# Patient Record
Sex: FEMALE | Race: WHITE | NOT HISPANIC OR LATINO | Employment: OTHER | URBAN - METROPOLITAN AREA
[De-identification: names, ages, dates, MRNs, and addresses within clinical notes are randomized per-mention and may not be internally consistent; named-entity substitution may affect disease eponyms.]

---

## 2018-03-21 ENCOUNTER — OFFICE VISIT (OUTPATIENT)
Dept: FAMILY MEDICINE CLINIC | Facility: CLINIC | Age: 68
End: 2018-03-21

## 2018-03-21 ENCOUNTER — DOCUMENTATION (OUTPATIENT)
Dept: FAMILY MEDICINE CLINIC | Facility: CLINIC | Age: 68
End: 2018-03-21

## 2018-03-21 VITALS
BODY MASS INDEX: 29.81 KG/M2 | SYSTOLIC BLOOD PRESSURE: 126 MMHG | WEIGHT: 162 LBS | HEIGHT: 62 IN | OXYGEN SATURATION: 98 % | RESPIRATION RATE: 14 BRPM | DIASTOLIC BLOOD PRESSURE: 86 MMHG | HEART RATE: 77 BPM

## 2018-03-21 DIAGNOSIS — Z23 NEED FOR VACCINATION: ICD-10-CM

## 2018-03-21 DIAGNOSIS — B02.9 HERPES ZOSTER WITHOUT COMPLICATION: ICD-10-CM

## 2018-03-21 DIAGNOSIS — Z78.0 POST-MENOPAUSAL: ICD-10-CM

## 2018-03-21 DIAGNOSIS — B35.1 ONYCHOMYCOSIS: ICD-10-CM

## 2018-03-21 DIAGNOSIS — Z12.11 SCREENING FOR COLON CANCER: ICD-10-CM

## 2018-03-21 DIAGNOSIS — E78.5 HYPERLIPIDEMIA, UNSPECIFIED HYPERLIPIDEMIA TYPE: ICD-10-CM

## 2018-03-21 DIAGNOSIS — M85.80 OSTEOPENIA, UNSPECIFIED LOCATION: ICD-10-CM

## 2018-03-21 DIAGNOSIS — Z13.1 SCREENING FOR DIABETES MELLITUS: ICD-10-CM

## 2018-03-21 DIAGNOSIS — H93.13 TINNITUS OF BOTH EARS: ICD-10-CM

## 2018-03-21 DIAGNOSIS — Z91.89 AT RISK FOR CANCER: ICD-10-CM

## 2018-03-21 DIAGNOSIS — R53.83 OTHER FATIGUE: ICD-10-CM

## 2018-03-21 DIAGNOSIS — Z12.4 SCREENING FOR CERVICAL CANCER: ICD-10-CM

## 2018-03-21 DIAGNOSIS — Z00.00 ANNUAL PHYSICAL EXAM: Primary | ICD-10-CM

## 2018-03-21 DIAGNOSIS — Z11.59 NEED FOR HEPATITIS C SCREENING TEST: ICD-10-CM

## 2018-03-21 DIAGNOSIS — E03.9 HYPOTHYROIDISM, UNSPECIFIED TYPE: ICD-10-CM

## 2018-03-21 LAB
ALBUMIN SERPL-MCNC: 4.7 G/DL (ref 3.5–5.2)
ALBUMIN/GLOB SERPL: 1.7 G/DL
ALP SERPL-CCNC: 79 U/L (ref 39–117)
ALT SERPL-CCNC: 26 U/L (ref 1–33)
AST SERPL-CCNC: 26 U/L (ref 1–32)
BASOPHILS # BLD AUTO: 0.08 10*3/MM3 (ref 0–0.2)
BASOPHILS NFR BLD AUTO: 1 % (ref 0–1.5)
BILIRUB SERPL-MCNC: 0.3 MG/DL (ref 0.1–1.2)
BUN SERPL-MCNC: 15 MG/DL (ref 8–23)
BUN/CREAT SERPL: 17.4 (ref 7–25)
CALCIUM SERPL-MCNC: 10.4 MG/DL (ref 8.6–10.5)
CHLORIDE SERPL-SCNC: 100 MMOL/L (ref 98–107)
CHOLEST SERPL-MCNC: 309 MG/DL (ref 0–200)
CO2 SERPL-SCNC: 28 MMOL/L (ref 22–29)
CREAT SERPL-MCNC: 0.86 MG/DL (ref 0.57–1)
EOSINOPHIL # BLD AUTO: 0.2 10*3/MM3 (ref 0–0.7)
EOSINOPHIL NFR BLD AUTO: 2.4 % (ref 0.3–6.2)
ERYTHROCYTE [DISTWIDTH] IN BLOOD BY AUTOMATED COUNT: 14.9 % (ref 11.7–13)
GFR SERPLBLD CREATININE-BSD FMLA CKD-EPI: 66 ML/MIN/1.73
GFR SERPLBLD CREATININE-BSD FMLA CKD-EPI: 80 ML/MIN/1.73
GLOBULIN SER CALC-MCNC: 2.7 GM/DL
GLUCOSE SERPL-MCNC: 86 MG/DL (ref 65–99)
HCT VFR BLD AUTO: 46.4 % (ref 35.6–45.5)
HDLC SERPL-MCNC: 49 MG/DL (ref 40–60)
HGB BLD-MCNC: 14.5 G/DL (ref 11.9–15.5)
IMM GRANULOCYTES # BLD: 0.03 10*3/MM3 (ref 0–0.03)
IMM GRANULOCYTES NFR BLD: 0.4 % (ref 0–0.5)
LDLC SERPL CALC-MCNC: 227 MG/DL (ref 0–100)
LDLC/HDLC SERPL: 4.63 {RATIO}
LYMPHOCYTES # BLD AUTO: 2.31 10*3/MM3 (ref 0.9–4.8)
LYMPHOCYTES NFR BLD AUTO: 27.5 % (ref 19.6–45.3)
MCH RBC QN AUTO: 28.8 PG (ref 26.9–32)
MCHC RBC AUTO-ENTMCNC: 31.3 G/DL (ref 32.4–36.3)
MCV RBC AUTO: 92.1 FL (ref 80.5–98.2)
MONOCYTES # BLD AUTO: 0.72 10*3/MM3 (ref 0.2–1.2)
MONOCYTES NFR BLD AUTO: 8.6 % (ref 5–12)
NEUTROPHILS # BLD AUTO: 5.07 10*3/MM3 (ref 1.9–8.1)
NEUTROPHILS NFR BLD AUTO: 60.1 % (ref 42.7–76)
PLATELET # BLD AUTO: 253 10*3/MM3 (ref 140–500)
POTASSIUM SERPL-SCNC: 4.2 MMOL/L (ref 3.5–5.2)
PROT SERPL-MCNC: 7.4 G/DL (ref 6–8.5)
RBC # BLD AUTO: 5.04 10*6/MM3 (ref 3.9–5.2)
SODIUM SERPL-SCNC: 143 MMOL/L (ref 136–145)
TRIGL SERPL-MCNC: 166 MG/DL (ref 0–150)
TSH SERPL DL<=0.005 MIU/L-ACNC: 3.75 MIU/ML (ref 0.27–4.2)
VLDLC SERPL CALC-MCNC: 33.2 MG/DL (ref 5–40)
WBC # BLD AUTO: 8.41 10*3/MM3 (ref 4.5–10.7)

## 2018-03-21 PROCEDURE — G0009 ADMIN PNEUMOCOCCAL VACCINE: HCPCS | Performed by: FAMILY MEDICINE

## 2018-03-21 PROCEDURE — G0402 INITIAL PREVENTIVE EXAM: HCPCS | Performed by: FAMILY MEDICINE

## 2018-03-21 PROCEDURE — 90670 PCV13 VACCINE IM: CPT | Performed by: FAMILY MEDICINE

## 2018-03-21 RX ORDER — VALACYCLOVIR HYDROCHLORIDE 1 G/1
1000 TABLET, FILM COATED ORAL 3 TIMES DAILY
Qty: 21 TABLET | Refills: 0 | Status: SHIPPED | OUTPATIENT
Start: 2018-03-21 | End: 2018-03-30 | Stop reason: SDUPTHER

## 2018-03-21 NOTE — PATIENT INSTRUCTIONS
"Massage David's Vaporub into your toenails each evening.      Don't forget to schedule an eye exam soon.      Try doing 2 sets of 30 Kegels daily.    Try to drink 2 liters of water per day.      Food Choices to Lower Your Triglycerides  Triglycerides are a type of fat in your blood. High levels of triglycerides can increase the risk of heart disease and stroke. If your triglyceride levels are high, the foods you eat and your eating habits are very important. Choosing the right foods can help lower your triglycerides.  What general guidelines do I need to follow?  · Lose weight if you are overweight.  · Limit or avoid alcohol.  · Fill one half of your plate with vegetables and green salads.  · Limit fruit to two servings a day. Choose fruit instead of juice.  · Make one fourth of your plate whole grains. Look for the word \"whole\" as the first word in the ingredient list.  · Fill one fourth of your plate with lean protein foods.  · Enjoy fatty fish (such as salmon, mackerel, sardines, and tuna) three times a week.  · Choose healthy fats.  · Limit foods high in starch and sugar.  · Eat more home-cooked food and less restaurant, buffet, and fast food.  · Limit fried foods.  · Cook foods using methods other than frying.  · Limit saturated fats.  · Check ingredient lists to avoid foods with partially hydrogenated oils (trans fats) in them.  What foods can I eat?  Grains   Whole grains, such as whole wheat or whole grain breads, crackers, cereals, and pasta. Unsweetened oatmeal, bulgur, barley, quinoa, or brown rice. Corn or whole wheat flour tortillas.  Vegetables   Fresh or frozen vegetables (raw, steamed, roasted, or grilled). Green salads.  Fruits   All fresh, canned (in natural juice), or frozen fruits.  Meat and Other Protein Products   Ground beef (85% or leaner), grass-fed beef, or beef trimmed of fat. Skinless chicken or turkey. Ground chicken or turkey. Pork trimmed of fat. All fish and seafood. Eggs. Dried beans, " peas, or lentils. Unsalted nuts or seeds. Unsalted canned or dry beans.  Dairy   Low-fat dairy products, such as skim or 1% milk, 2% or reduced-fat cheeses, low-fat ricotta or cottage cheese, or plain low-fat yogurt.  Fats and Oils   Tub margarines without trans fats. Light or reduced-fat mayonnaise and salad dressings. Avocado. Safflower, olive, or canola oils. Natural peanut or almond butter.  The items listed above may not be a complete list of recommended foods or beverages. Contact your dietitian for more options.   What foods are not recommended?  Grains   White bread. White pasta. White rice. Cornbread. Bagels, pastries, and croissants. Crackers that contain trans fat.  Vegetables   White potatoes. Corn. Creamed or fried vegetables. Vegetables in a cheese sauce.  Fruits   Dried fruits. Canned fruit in light or heavy syrup. Fruit juice.  Meat and Other Protein Products   Fatty cuts of meat. Ribs, chicken wings, mcdaniel, sausage, bologna, salami, chitterlings, fatback, hot dogs, bratwurst, and packaged luncheon meats.  Dairy   Whole or 2% milk, cream, half-and-half, and cream cheese. Whole-fat or sweetened yogurt. Full-fat cheeses. Nondairy creamers and whipped toppings. Processed cheese, cheese spreads, or cheese curds.  Sweets and Desserts   Corn syrup, sugars, honey, and molasses. Candy. Jam and jelly. Syrup. Sweetened cereals. Cookies, pies, cakes, donuts, muffins, and ice cream.  Fats and Oils   Butter, stick margarine, lard, shortening, ghee, or mcdaniel fat. Coconut, palm kernel, or palm oils.  Beverages   Alcohol. Sweetened drinks (such as sodas, lemonade, and fruit drinks or punches).  The items listed above may not be a complete list of foods and beverages to avoid. Contact your dietitian for more information.   This information is not intended to replace advice given to you by your health care provider. Make sure you discuss any questions you have with your health care provider.  Document Released:  10/05/2005 Document Revised: 05/25/2017 Document Reviewed: 10/22/2014  Elsevier Interactive Patient Education © 2017 [employer]

## 2018-03-21 NOTE — PROGRESS NOTES
Welcome to Medicare    Subjective   History of Present Illness    Leny Neumann is a 67 y.o. female who presents for an Medicare Wellness Visit. In addition, we addressed the following health issues:      Toenail fungus on both first toenails  Worsening for a few years  She has been treated with Lamisil 3 x with no improvement    Worsening fatigue x 1 yr    Bilateral tinnitus for at least 2 yr    Rash on R flank x 3 weeks, itches and burns    Past Medical History:   Diagnosis Date   • Allergic    • Colon polyp    • Hyperlipidemia     did not tolerate statins   • Hypothyroidism     transient   • Osteopenia      History reviewed. No pertinent surgical history.      Family History   Problem Relation Age of Onset   • Breast cancer Mother 68   • Lung cancer Father 64   • Heart failure Father    • Heart attack Father    • Alcohol abuse Daughter    • Early death Maternal Grandfather 35     farm accident       Social History     Social History   • Marital status:      Social History Main Topics   • Smoking status: Former Smoker     Quit date: 2012   • Smokeless tobacco: Never Used      Comment: smoked 1 ppd for 20 yr, quit in 2000, then relapsed for a year in 2011   • Alcohol use Yes      Comment: Rarely   • Drug use: No   • Sexual activity: Not Currently     Social History Narrative    Lives in a place of her own with her 2 dogs.  Works as an .           No Known Allergies    No outpatient prescriptions prior to visit.     No facility-administered medications prior to visit.         Patient Active Problem List   Diagnosis   • Hyperlipidemia   • Osteopenia   • Colon polyp   • Allergic   • Hypothyroidism   • Tinnitus of both ears   • Onychomycosis         Patient Care Team:  Salome Weaver MD as PCP - General (Family Medicine)     Health Habits:  Current Diet: Well balanced diet  Dental Exam.  UTD  Eye Exam. Not UTD  Exercise: used to exercise 5 x per week, but has dropped down to 1 x per  week due to fatigue   Current exercise activities include: exercise machines  Fasting for labs today  She lost 45 lb intentionally with Isa Chase in 2015, but has regained it   Pt reports that her mammogram is UTD and WNL in July 2017 at the [facility]  Last pap smear was over 5 yr ago; no hx of abnormals  She declines STI screening  Menopause at age 42, no post-menopausal bleeding  Last DEXA showed osteopenia a couple of years ago  Colonoscopy is due  Mild depression but no SI; pt declines SSRI or referral for counseling at this time    Recent Hospitalizations:  none    Depression Screen:   PHQ-2/PHQ-9 Depression Screening 3/21/2018   Little interest or pleasure in doing things 0   Feeling down, depressed, or hopeless 1   Total Score 1       Functional and Cognitive Screening:  Functional & Cognitive Status 3/21/2018   Do you have difficulty preparing food and eating? No   Do you have difficulty bathing yourself, getting dressed or grooming yourself? No   Do you have difficulty using the toilet? No   Do you have difficulty moving around from place to place? No   Do you have trouble with steps or getting out of a bed or a chair? No   In the past year have you fallen or experienced a near fall? No   Current Diet Well Balanced Diet   Dental Exam Up to date   Eye Exam Not up to date   Exercise (times per week) 2 times per week   Current Exercise Activities Include Cardiovasular Workout on Exercise Equipment   Do you need help using the phone?  No   Are you deaf or do you have serious difficulty hearing?  No   Do you need help with transportation? No   Do you need help shopping? No   Do you need help preparing meals?  No   Do you need help with housework?  No   Do you need help with laundry? No   Do you need help taking your medications? No   Do you need help managing money? No   Do you ever drive or ride in a car without wearing a seat belt? No     Does the patient have evidence of cognitive impairment?  "NO      Review of Systems   Constitutional: Positive for fatigue and unexpected weight change. Negative for fever.   HENT: Positive for congestion and tinnitus (bilateral for years). Negative for ear pain, rhinorrhea, sinus pain, sinus pressure and sore throat.    Eyes: Negative for pain and visual disturbance.   Respiratory: Positive for shortness of breath (since URI last month). Negative for cough, chest tightness and wheezing.    Cardiovascular: Negative for chest pain and palpitations.   Gastrointestinal: Negative for abdominal pain, blood in stool, constipation, diarrhea, nausea and vomiting.   Genitourinary: Positive for enuresis (small volume, seemingly random). Negative for decreased urine volume, dysuria, hematuria, pelvic pain, urgency, vaginal bleeding, vaginal discharge and vaginal pain.        Occasional foul smelling urine   Musculoskeletal: Negative for arthralgias, gait problem and myalgias.   Skin: Positive for rash (on trunk). Negative for wound.   Neurological: Positive for light-headedness (occasionally, with position changes). Negative for dizziness, weakness and numbness.   Hematological: Positive for adenopathy (coming down after recent URI). Does not bruise/bleed easily.   Psychiatric/Behavioral: Positive for dysphoric mood (mild). Negative for sleep disturbance and suicidal ideas. The patient is not nervous/anxious.        Objective     Vitals:    03/21/18 1019   BP: 126/86   Pulse: 77   Resp: 14   SpO2: 98%   Weight: 73.5 kg (162 lb)   Height: 156.2 cm (61.5\")       Body mass index is 30.11 kg/m².    Physical Exam   Constitutional: Vital signs are normal. She appears well-developed and well-nourished. No distress.   HENT:   Head: Normocephalic and atraumatic.   Right Ear: Tympanic membrane and ear canal normal.   Left Ear: Tympanic membrane and ear canal normal.   Mouth/Throat: Oropharynx is clear and moist and mucous membranes are normal.   Cardiovascular: Normal rate, regular rhythm, S1 " normal, S2 normal and normal heart sounds.  Exam reveals no gallop and no friction rub.    No murmur heard.  Pulses:       Radial pulses are 2+ on the right side, and 2+ on the left side.   Pulmonary/Chest: Effort normal and breath sounds normal. She has no wheezes. She has no rales. She exhibits no tenderness. Right breast exhibits no mass, no nipple discharge and no skin change. Left breast exhibits no mass, no nipple discharge and no skin change.   Abdominal: Soft. Bowel sounds are normal. She exhibits no distension and no mass. There is no hepatosplenomegaly. There is no tenderness. There is no rebound and no guarding. No hernia.   Genitourinary: Vagina normal and uterus normal. No breast tenderness. Pelvic exam was performed with patient supine. There is no rash or lesion on the right labia. There is lesion (3 mm skin tag) on the left labia. There is no rash on the left labia. Cervix exhibits no motion tenderness, no discharge and no friability. Right adnexum displays no mass and no fullness. Left adnexum displays no mass and no fullness. No bleeding in the vagina. No vaginal discharge found.   Lymphadenopathy:     She has cervical adenopathy (bilateral, mildly tender).     She has no axillary adenopathy.        Right: No inguinal and no supraclavicular adenopathy present.        Left: No inguinal and no supraclavicular adenopathy present.   Skin: Skin is warm and dry. No rash noted. No cyanosis or erythema. Nails show no clubbing.   1st toenails thickened, yellow, and ridged.    Erythematous vesicular rash in a band-like pattern over R lateral rib cage   Psychiatric: Her speech is normal and behavior is normal. Her mood appears anxious.   Nursing note and vitals reviewed.      ASSESSMENT AND PLAN      Problem List Items Addressed This Visit        Cardiovascular and Mediastinum    Hyperlipidemia    Relevant Orders    Lipid Panel With LDL / HDL Ratio       Endocrine    Hypothyroidism    Relevant Orders    TSH        Nervous and Auditory    Tinnitus of both ears    Relevant Orders    Ambulatory Referral to ENT (Otolaryngology)       Musculoskeletal and Integument    Osteopenia    Relevant Orders    DEXA Bone Density Axial    Onychomycosis    Relevant Medications    Pt has failed 3 rounds of terbinafine and several topical medications  Therefore will treat with 3 month pulse-dosed itraconazole 200 MG tablet  If unsuccessful will refer to Podiatry      Other Visit Diagnoses     Annual physical exam    -  Primary    Relevant Orders    Comprehensive Metabolic Panel    Lipid Panel With LDL / HDL Ratio    TSH    DEXA Bone Density Axial    Ambulatory Referral to Gastroenterology    Hepatitis C Antibody    Pneumococcal Conjugate Vaccine 13-Valent All (PCV13) (Completed)    CBC & Differential    Pap IG (Image Guided)    Screening for diabetes mellitus        Relevant Orders    Comprehensive Metabolic Panel    Post-menopausal        Relevant Orders    DEXA Bone Density Axial    Screening for colon cancer        Relevant Orders    Ambulatory Referral to Gastroenterology    Need for hepatitis C screening test        Relevant Orders    Hepatitis C Antibody    Need for vaccination        Relevant Orders    Pneumococcal Conjugate Vaccine 13-Valent All (PCV13) (Completed)    Screening for cervical cancer        Relevant Orders    Pap IG (Image Guided)    Other fatigue        Relevant Orders    CBC & Differential  TSH  CMP      Herpes zoster without complication        Relevant Medications    valACYclovir (VALTREX) 1000 MG tablet    At risk for cancer              Orders:  Orders Placed This Encounter   Procedures   • DEXA Bone Density Axial   • Pneumococcal Conjugate Vaccine 13-Valent All (PCV13)   • Comprehensive Metabolic Panel   • Lipid Panel With LDL / HDL Ratio   • TSH   • Hepatitis C Antibody   • Ambulatory Referral to Gastroenterology   • Ambulatory Referral to ENT (Otolaryngology)   • CBC & Differential       Follow Up:  Return in  about 1 year (around 3/21/2019), or if symptoms worsen or fail to improve.       An After Visit Summary and PPPS with all of these plans were given to the patient.

## 2018-03-22 LAB — HCV AB S/CO SERPL IA: <0.1 S/CO RATIO (ref 0–0.9)

## 2018-03-23 LAB
CYTOLOGIST CVX/VAG CYTO: NORMAL
CYTOLOGY CVX/VAG DOC THIN PREP: NORMAL
DX ICD CODE: NORMAL
HIV 1 & 2 AB SER-IMP: NORMAL
OTHER STN SPEC: NORMAL
PATH REPORT.FINAL DX SPEC: NORMAL
STAT OF ADQ CVX/VAG CYTO-IMP: NORMAL

## 2018-03-28 ENCOUNTER — HOSPITAL ENCOUNTER (OUTPATIENT)
Dept: BONE DENSITY | Facility: HOSPITAL | Age: 68
Discharge: HOME | End: 2018-03-28
Admitting: FAMILY MEDICINE

## 2018-03-28 ENCOUNTER — TELEPHONE (OUTPATIENT)
Dept: FAMILY MEDICINE CLINIC | Facility: CLINIC | Age: 68
End: 2018-03-28

## 2018-03-28 DIAGNOSIS — E78.2 MIXED HYPERLIPIDEMIA: Primary | ICD-10-CM

## 2018-03-28 DIAGNOSIS — N95.1 MENOPAUSAL HOT FLUSHES: ICD-10-CM

## 2018-03-28 DIAGNOSIS — B35.1 ONYCHOMYCOSIS: Primary | ICD-10-CM

## 2018-03-28 PROCEDURE — 77080 DXA BONE DENSITY AXIAL: CPT

## 2018-03-28 RX ORDER — PRAVASTATIN SODIUM 20 MG
20 TABLET ORAL DAILY
Qty: 30 TABLET | Refills: 2 | Status: SHIPPED | OUTPATIENT
Start: 2018-03-28 | End: 2018-03-30 | Stop reason: SDUPTHER

## 2018-03-28 RX ORDER — PAROXETINE 10 MG/1
10 TABLET, FILM COATED ORAL EVERY MORNING
Qty: 30 TABLET | Refills: 2 | Status: SHIPPED | OUTPATIENT
Start: 2018-03-28 | End: 2018-03-30 | Stop reason: SDUPTHER

## 2018-03-28 NOTE — PROGRESS NOTES
Pt called and informed of osteopenia.  Pt advised to start an OTC calcium-vitamin D supplement and increase weight-bearing exercise.

## 2018-03-28 NOTE — TELEPHONE ENCOUNTER
Pt called to discuss lab results.  Pt also mentions that itraconazole prescribed for onychomycosis is too expensive even with partial coverage from her insurer.  She has failed 3 rounds of treatment with terbinafine in the past.  Will refer to Podiatry.

## 2018-03-28 NOTE — PROGRESS NOTES
"Pt called and results discussed.  Significant  HLD with .  Pt reports severe myalgias and \"knots on the ankles\" with Lipitor, Zocor, and Simvastatin in the past.  She has never tried Pravastatin and would be willing to see if she tolerates it.  Pap smear, Hep C screening, blood sugar, kidney function, liver enzymes, blood counts and thyroid hormone were normal.  Pt requests medication for menopausal hot flashes that make it hard for her to sleep at night.  Will send Rx for Brisdelle to her pharmacy.  Pt advised to start 1 medication then wait a week before starting the second new medication.  Follow up in 2-3 months.    "

## 2018-03-30 DIAGNOSIS — E78.2 MIXED HYPERLIPIDEMIA: ICD-10-CM

## 2018-03-30 DIAGNOSIS — N95.1 MENOPAUSAL HOT FLUSHES: ICD-10-CM

## 2018-03-30 DIAGNOSIS — B02.9 HERPES ZOSTER WITHOUT COMPLICATION: ICD-10-CM

## 2018-03-30 DIAGNOSIS — B35.1 ONYCHOMYCOSIS: ICD-10-CM

## 2018-03-30 RX ORDER — PAROXETINE 10 MG/1
10 TABLET, FILM COATED ORAL EVERY MORNING
Qty: 30 TABLET | Refills: 2 | Status: SHIPPED | OUTPATIENT
Start: 2018-03-30 | End: 2018-11-14

## 2018-03-30 RX ORDER — VALACYCLOVIR HYDROCHLORIDE 1 G/1
1000 TABLET, FILM COATED ORAL 3 TIMES DAILY
Qty: 21 TABLET | Refills: 0 | Status: SHIPPED | OUTPATIENT
Start: 2018-03-30 | End: 2018-05-04 | Stop reason: SDUPTHER

## 2018-03-30 RX ORDER — PRAVASTATIN SODIUM 20 MG
20 TABLET ORAL DAILY
Qty: 30 TABLET | Refills: 2 | Status: SHIPPED | OUTPATIENT
Start: 2018-03-30 | End: 2018-06-29 | Stop reason: SDUPTHER

## 2018-04-27 ENCOUNTER — PREP FOR SURGERY (OUTPATIENT)
Dept: OTHER | Facility: HOSPITAL | Age: 68
End: 2018-04-27

## 2018-04-27 DIAGNOSIS — Z12.11 SCREENING FOR COLON CANCER: Primary | ICD-10-CM

## 2018-05-04 DIAGNOSIS — B02.9 HERPES ZOSTER WITHOUT COMPLICATION: ICD-10-CM

## 2018-05-04 RX ORDER — VALACYCLOVIR HYDROCHLORIDE 1 G/1
1000 TABLET, FILM COATED ORAL 3 TIMES DAILY
Qty: 21 TABLET | Refills: 0 | Status: SHIPPED | OUTPATIENT
Start: 2018-05-04 | End: 2018-11-14

## 2018-05-16 PROBLEM — Z12.11 SCREENING FOR COLON CANCER: Status: ACTIVE | Noted: 2018-05-16

## 2018-06-28 ENCOUNTER — TELEPHONE (OUTPATIENT)
Dept: FAMILY MEDICINE CLINIC | Facility: CLINIC | Age: 68
End: 2018-06-28

## 2018-06-28 NOTE — TELEPHONE ENCOUNTER
Called and spoke with patient to let her know her mammogram was normal.     ----- Message from Salome Weaver MD sent at 6/27/2018  7:49 PM EDT -----  Please contact pt and let her know that her recent mammogram was normal.  Next routine mammogram will be due in 1 year.

## 2018-06-29 DIAGNOSIS — E78.2 MIXED HYPERLIPIDEMIA: ICD-10-CM

## 2018-06-29 RX ORDER — PRAVASTATIN SODIUM 20 MG
20 TABLET ORAL DAILY
Qty: 30 TABLET | Refills: 4 | Status: SHIPPED | OUTPATIENT
Start: 2018-06-29 | End: 2018-12-19 | Stop reason: SDUPTHER

## 2018-08-16 ENCOUNTER — ANESTHESIA EVENT (OUTPATIENT)
Dept: GASTROENTEROLOGY | Facility: HOSPITAL | Age: 68
End: 2018-08-16

## 2018-08-16 ENCOUNTER — HOSPITAL ENCOUNTER (OUTPATIENT)
Facility: HOSPITAL | Age: 68
Setting detail: HOSPITAL OUTPATIENT SURGERY
Discharge: HOME | End: 2018-08-16
Attending: INTERNAL MEDICINE | Admitting: INTERNAL MEDICINE

## 2018-08-16 ENCOUNTER — ANESTHESIA (OUTPATIENT)
Dept: GASTROENTEROLOGY | Facility: HOSPITAL | Age: 68
End: 2018-08-16

## 2018-08-16 VITALS
HEART RATE: 63 BPM | WEIGHT: 158.38 LBS | RESPIRATION RATE: 16 BRPM | OXYGEN SATURATION: 99 % | DIASTOLIC BLOOD PRESSURE: 79 MMHG | SYSTOLIC BLOOD PRESSURE: 152 MMHG | TEMPERATURE: 98.1 F | BODY MASS INDEX: 29.44 KG/M2

## 2018-08-16 DIAGNOSIS — Z12.11 SCREENING FOR COLON CANCER: ICD-10-CM

## 2018-08-16 PROCEDURE — S0260 H&P FOR SURGERY: HCPCS | Performed by: INTERNAL MEDICINE

## 2018-08-16 PROCEDURE — 45380 COLONOSCOPY AND BIOPSY: CPT | Performed by: INTERNAL MEDICINE

## 2018-08-16 PROCEDURE — 45385 COLONOSCOPY W/LESION REMOVAL: CPT | Performed by: INTERNAL MEDICINE

## 2018-08-16 PROCEDURE — 88305 TISSUE EXAM BY PATHOLOGIST: CPT | Performed by: INTERNAL MEDICINE

## 2018-08-16 PROCEDURE — 25010000002 PROPOFOL 10 MG/ML EMULSION: Performed by: ANESTHESIOLOGY

## 2018-08-16 RX ORDER — PROPOFOL 10 MG/ML
VIAL (ML) INTRAVENOUS AS NEEDED
Status: DISCONTINUED | OUTPATIENT
Start: 2018-08-16 | End: 2018-08-16 | Stop reason: SURG

## 2018-08-16 RX ORDER — PROPOFOL 10 MG/ML
VIAL (ML) INTRAVENOUS CONTINUOUS PRN
Status: DISCONTINUED | OUTPATIENT
Start: 2018-08-16 | End: 2018-08-16 | Stop reason: SURG

## 2018-08-16 RX ORDER — LIDOCAINE HYDROCHLORIDE 20 MG/ML
INJECTION, SOLUTION INFILTRATION; PERINEURAL AS NEEDED
Status: DISCONTINUED | OUTPATIENT
Start: 2018-08-16 | End: 2018-08-16 | Stop reason: SURG

## 2018-08-16 RX ORDER — SODIUM CHLORIDE 0.9 % (FLUSH) 0.9 %
3 SYRINGE (ML) INJECTION AS NEEDED
Status: DISCONTINUED | OUTPATIENT
Start: 2018-08-16 | End: 2018-08-16 | Stop reason: HOSPADM

## 2018-08-16 RX ORDER — SODIUM CHLORIDE, SODIUM LACTATE, POTASSIUM CHLORIDE, CALCIUM CHLORIDE 600; 310; 30; 20 MG/100ML; MG/100ML; MG/100ML; MG/100ML
1000 INJECTION, SOLUTION INTRAVENOUS CONTINUOUS
Status: DISCONTINUED | OUTPATIENT
Start: 2018-08-16 | End: 2018-08-16 | Stop reason: HOSPADM

## 2018-08-16 RX ADMIN — PROPOFOL 140 MCG/KG/MIN: 10 INJECTION, EMULSION INTRAVENOUS at 13:33

## 2018-08-16 RX ADMIN — LIDOCAINE HYDROCHLORIDE 50 MG: 20 INJECTION, SOLUTION INFILTRATION; PERINEURAL at 13:33

## 2018-08-16 RX ADMIN — PROPOFOL 125 MG: 10 INJECTION, EMULSION INTRAVENOUS at 13:33

## 2018-08-16 NOTE — ANESTHESIA POSTPROCEDURE EVALUATION
Patient: Leny Neumann    Procedure Summary     Date:  08/16/18 Room / Location:   [facility]    Anesthesia Start:  1327 Anesthesia Stop:  1407    Procedure:  COLONOSCOPY into cecum with polypectomy (N/A ) Diagnosis:       Screening for colon cancer      (Screening for colon cancer [Z12.11])    Surgeon:  Stephany Akbar MD Provider:  Justice Pritchard MD    Anesthesia Type:  MAC ASA Status:  1          Anesthesia Type: MAC  Last vitals  BP   152/79 (08/16/18 1427)   Temp   36.7 °C (98.1 °F) (08/16/18 1226)   Pulse   63 (08/16/18 1427)   Resp   16 (08/16/18 1427)     SpO2   99 % (08/16/18 1427)     Post Anesthesia Care and Evaluation    Patient location during evaluation: bedside  Patient participation: complete - patient participated  Level of consciousness: awake and alert  Pain score: 0  Pain management: adequate  Airway patency: patent  Anesthetic complications: No anesthetic complications  PONV Status: none  Cardiovascular status: acceptable  Respiratory status: acceptable  Hydration status: acceptable  Post Neuraxial Block status: Motor and sensory function returned to baseline

## 2018-08-16 NOTE — ANESTHESIA PREPROCEDURE EVALUATION
Anesthesia Evaluation     Patient summary reviewed                Airway   Mallampati: III  TM distance: <3 FB  Neck ROM: limited  Possible difficult intubation  Dental - normal exam     Pulmonary     breath sounds clear to auscultation  Cardiovascular   Exercise tolerance: good (4-7 METS)    Rhythm: regular  Rate: normal        Neuro/Psych  GI/Hepatic/Renal/Endo      Musculoskeletal     Abdominal    Substance History      OB/GYN          Other                        Anesthesia Plan    ASA 1     MAC     intravenous induction   Anesthetic plan and risks discussed with patient.

## 2018-08-16 NOTE — H&P
[facility]  Pre Procedure History & Physical    Chief Complaint:   History of polyps    Subjective     HPI:   69 yo wf for colonoscopy. She reports last exam was approximately 10 years ago and she had 3 polyps removed. She denies any active GI symptoms or concerns.  No FH CRC/polyps.    Past Medical History:   Past Medical History:   Diagnosis Date   • Allergic    • Colon polyp    • Hyperlipidemia     did not tolerate statins   • Hypothyroidism     transient   • Menopausal hot flushes    • Osteopenia    • PONV (postoperative nausea and vomiting)    • Tinea pedis        Past Surgical History:  History reviewed. No pertinent surgical history.    Family History:  Family History   Problem Relation Age of Onset   • Breast cancer Mother 68   • Lung cancer Father 64   • Heart failure Father    • Heart attack Father    • Alcohol abuse Daughter    • Early death Maternal Grandfather 35        farm accident       Social History:   reports that she quit smoking about 6 years ago. She has never used smokeless tobacco. She reports that she drinks alcohol. She reports that she does not use drugs.    Medications:   Prescriptions Prior to Admission   Medication Sig Dispense Refill Last Dose   • Itraconazole 200 MG tablet Take 200 mg by mouth See Admin Instructions. Take 1 tablet PO BID x 1 week each month 42 tablet 1 8/7/2018   • PARoxetine (PAXIL) 10 MG tablet Take 1 tablet by mouth Every Morning. 30 tablet 2 Unknown at Unknown time   • pravastatin (PRAVACHOL) 20 MG tablet TAKE 1 TABLET BY MOUTH DAILY. 30 tablet 4 8/12/2018   • valACYclovir (VALTREX) 1000 MG tablet Take 1 tablet by mouth 3 (Three) Times a Day. 21 tablet 0 More than a month at Unknown time       Allergies:  Patient has no known allergies.    ROS:    Pertinent items are noted in HPI, all other systems reviewed and negative     Objective     Blood pressure 124/95, pulse 68, temperature 98.1 °F (36.7 °C), temperature source Oral, resp. rate 18,  weight 71.8 kg (158 lb 6 oz), SpO2 97 %.    Physical Exam   Constitutional: Pt is oriented to person, place, and time and well-developed, well-nourished, and in no distress.   Mouth/Throat: Oropharynx is clear and moist.   Neck: Normal range of motion.   Cardiovascular: Normal rate, regular rhythm    Pulmonary/Chest: Effort normal   Abdominal: Soft. Nontender  Skin: Skin is warm and dry.   Psychiatric: Mood, memory, affect and judgment normal.     Assessment/Plan     Diagnosis:  History of polyps    Anticipated Surgical Procedure:  colonoscopy    The risks, benefits, and alternatives of this procedure have been discussed with the patient or the responsible party- the patient understands and agrees to proceed.

## 2018-08-17 LAB
CYTO UR: NORMAL
LAB AP CASE REPORT: NORMAL
LAB AP CLINICAL INFORMATION: NORMAL
PATH REPORT.FINAL DX SPEC: NORMAL
PATH REPORT.GROSS SPEC: NORMAL

## 2018-08-20 ENCOUNTER — TELEPHONE (OUTPATIENT)
Dept: GASTROENTEROLOGY | Facility: CLINIC | Age: 68
End: 2018-08-20

## 2018-08-21 NOTE — TELEPHONE ENCOUNTER
Called pt and advised per Dr Akbar that the polyp showed hyperplastic change.  This is not cancerous and not precancerous and she recommends a repeat c/s in 5 yrs. Pt verb understanding.     C/s placed in recall for 08/16/2023.

## 2018-11-14 ENCOUNTER — OFFICE VISIT (OUTPATIENT)
Dept: FAMILY MEDICINE CLINIC | Facility: CLINIC | Age: 68
End: 2018-11-14

## 2018-11-14 VITALS
SYSTOLIC BLOOD PRESSURE: 118 MMHG | RESPIRATION RATE: 16 BRPM | HEIGHT: 62 IN | WEIGHT: 152 LBS | OXYGEN SATURATION: 98 % | HEART RATE: 86 BPM | BODY MASS INDEX: 27.97 KG/M2 | DIASTOLIC BLOOD PRESSURE: 70 MMHG | TEMPERATURE: 97.7 F

## 2018-11-14 DIAGNOSIS — R09.82 POST-NASAL DRIP: ICD-10-CM

## 2018-11-14 DIAGNOSIS — J01.10 ACUTE NON-RECURRENT FRONTAL SINUSITIS: ICD-10-CM

## 2018-11-14 DIAGNOSIS — J30.2 SEASONAL ALLERGIES: Primary | ICD-10-CM

## 2018-11-14 PROCEDURE — 99213 OFFICE O/P EST LOW 20 MIN: CPT | Performed by: NURSE PRACTITIONER

## 2018-11-14 RX ORDER — MULTIPLE VITAMINS W/ MINERALS TAB 9MG-400MCG
1 TAB ORAL DAILY
COMMUNITY
End: 2022-08-31

## 2018-11-14 RX ORDER — AMOXICILLIN 875 MG/1
875 TABLET, COATED ORAL 2 TIMES DAILY
Qty: 20 TABLET | Refills: 0 | Status: SHIPPED | OUTPATIENT
Start: 2018-11-14 | End: 2019-04-02

## 2018-11-14 NOTE — PROGRESS NOTES
Leny Neumann is a 68 y.o. female. Pt is here for sore throat, sinus pressure and cough. Pt states 3-4 days ago she started having sore throat, and it's been worsening until today, that  She feels a little better. She states she has occasional cough , and has very thick mucus and sinus pressure.   Pt was using flonase and afrin. Pt has severe post nasal drip.  Sinus Pain: Patient complains of congestion, low grade fever, nasal congestion, post nasal drip and sinus pressure. Symptoms include congestion, post nasal drip and sinus pressure with no fever, chills, night sweats or weight loss. Onset of symptoms was 10 days ago, gradually worsening since that time. She is drinking plenty of fluids.  Past history is significant for no history of pneumonia or bronchitis. Patient is non-smoker.    Assessment/Plan   Problem List Items Addressed This Visit     None      Visit Diagnoses     Seasonal allergies    -  Primary    Relevant Orders    Ambulatory Referral to Allergy    Post-nasal drip        Relevant Orders    Ambulatory Referral to Allergy    Acute non-recurrent frontal sinusitis                 Return for Annual.  Patient Instructions   Sinus Headache  A sinus headache happens when your sinuses become clogged or swollen. You may feel pain or pressure in your face, forehead, ears, or upper teeth. Sinus headaches can be mild or severe.  Follow these instructions at home:  · Take medicines only as told by your doctor.  · If you were given an antibiotic medicine, finish all of it even if you start to feel better.  · Use a nose spray if you feel stuffed up (congested).  · If told, apply a warm, moist washcloth to your face to help lessen pain.  Contact a doctor if:  · You get headaches more than one time each week.  · Light or sound bothers you.  · You have a fever.  · You feel sick to your stomach (nauseous) or you throw up (vomit).  · Your headaches do not get better with treatment.  Get help right away if:  · You  "have trouble seeing.  · You suddenly have very bad pain in your face or head.  · You start to twitch or shake (seizure).  · You are confused.  · You have a stiff neck.  This information is not intended to replace advice given to you by your health care provider. Make sure you discuss any questions you have with your health care provider.  Document Released: 2012 Document Revised: 2017 Document Reviewed: 2015  [employer] Interactive Patient Education © 2018 [employer]        Chief Complaint   Patient presents with   • Sore Throat   • Sinus Problem     Social History     Tobacco Use   • Smoking status: Former Smoker     Last attempt to quit:      Years since quittin.8   • Smokeless tobacco: Never Used   • Tobacco comment: smoked 1 ppd for 20 yr, quit in , then relapsed for a year in    Substance Use Topics   • Alcohol use: Yes     Comment: Rarely   • Drug use: No       History of Present Illness     The following portions of the patient's history were reviewed and updated as appropriate:PMHroutine: Social history , Allergies, Current Medications, Active Problem List and Health Maintenance    Review of Systems   HENT: Positive for postnasal drip, rhinorrhea, sinus pressure and sinus pain.        Objective   Vitals:    18 1047   BP: 118/70   Pulse: 86   Resp: 16   Temp: 97.7 °F (36.5 °C)   SpO2: 98%   Weight: 68.9 kg (152 lb)   Height: 156.2 cm (61.5\")     Body mass index is 28.26 kg/m².  Physical Exam   Constitutional: She is oriented to person, place, and time. She appears well-developed and well-nourished. No distress.   HENT:   Head: Normocephalic and atraumatic.   Right Ear: External ear normal.   Left Ear: External ear normal.   Nose: Mucosal edema and rhinorrhea present. Right sinus exhibits maxillary sinus tenderness. Left sinus exhibits maxillary sinus tenderness.   Mouth/Throat: Oropharynx is clear and moist. No oropharyngeal exudate.   Eyes: Conjunctivae and EOM are " normal. Pupils are equal, round, and reactive to light. Right eye exhibits no discharge.   Cardiovascular: Normal rate and regular rhythm.   Pulmonary/Chest: Effort normal and breath sounds normal. No respiratory distress. She has no wheezes.   Neurological: She is alert and oriented to person, place, and time.   Skin: Skin is warm and dry. No rash noted.   Psychiatric: She has a normal mood and affect. Her behavior is normal.   Nursing note and vitals reviewed.    Reviewed Data:  No visits with results within 1 Month(s) from this visit.   Latest known visit with results is:   Admission on 08/16/2018, Discharged on 08/16/2018   Component Date Value Ref Range Status   • Case Report 08/16/2018    Final                    Value:Surgical Pathology Report                         Case: [id number]                                  Authorizing Provider:  Stephany Akbar MD      Collected:           08/16/2018 02:00 PM          Ordering Location:     [facility]  Received:            08/16/2018 03:23 PM                                 [facility]                                                                  Pathologist:           Tim Pedro MD                                                      Specimen:    Large Intestine, Sigmoid Colon, x4                                                        • Clinical Information 08/16/2018    Final                    Value:This result contains rich text formatting which cannot be displayed here.   • Final Diagnosis 08/16/2018    Final                    Value:This result contains rich text formatting which cannot be displayed here.   • Gross Description 08/16/2018    Final                    Value:This result contains rich text formatting which cannot be displayed here.   • Microscopic Description 08/16/2018    Final                    Value:This result contains rich text formatting which cannot be displayed here.

## 2018-11-14 NOTE — PATIENT INSTRUCTIONS
Sinus Headache  A sinus headache happens when your sinuses become clogged or swollen. You may feel pain or pressure in your face, forehead, ears, or upper teeth. Sinus headaches can be mild or severe.  Follow these instructions at home:  · Take medicines only as told by your doctor.  · If you were given an antibiotic medicine, finish all of it even if you start to feel better.  · Use a nose spray if you feel stuffed up (congested).  · If told, apply a warm, moist washcloth to your face to help lessen pain.  Contact a doctor if:  · You get headaches more than one time each week.  · Light or sound bothers you.  · You have a fever.  · You feel sick to your stomach (nauseous) or you throw up (vomit).  · Your headaches do not get better with treatment.  Get help right away if:  · You have trouble seeing.  · You suddenly have very bad pain in your face or head.  · You start to twitch or shake (seizure).  · You are confused.  · You have a stiff neck.  This information is not intended to replace advice given to you by your health care provider. Make sure you discuss any questions you have with your health care provider.  Document Released: 04/18/2012 Document Revised: 08/13/2017 Document Reviewed: 12/14/2015  [employer] Interactive Patient Education © 2018 [employer]

## 2018-12-19 DIAGNOSIS — E78.2 MIXED HYPERLIPIDEMIA: ICD-10-CM

## 2018-12-19 RX ORDER — PRAVASTATIN SODIUM 20 MG
TABLET ORAL
Qty: 30 TABLET | Refills: 1 | Status: SHIPPED | OUTPATIENT
Start: 2018-12-19 | End: 2019-02-16 | Stop reason: SDUPTHER

## 2019-02-16 DIAGNOSIS — E78.2 MIXED HYPERLIPIDEMIA: ICD-10-CM

## 2019-02-18 RX ORDER — PRAVASTATIN SODIUM 20 MG
TABLET ORAL
Qty: 30 TABLET | Refills: 0 | Status: SHIPPED | OUTPATIENT
Start: 2019-02-18 | End: 2019-03-22 | Stop reason: SDUPTHER

## 2019-03-22 DIAGNOSIS — E78.2 MIXED HYPERLIPIDEMIA: ICD-10-CM

## 2019-03-22 RX ORDER — PRAVASTATIN SODIUM 20 MG
TABLET ORAL
Qty: 30 TABLET | Refills: 5 | Status: SHIPPED | OUTPATIENT
Start: 2019-03-22 | End: 2019-04-16 | Stop reason: DRUGHIGH

## 2019-04-02 ENCOUNTER — OFFICE VISIT (OUTPATIENT)
Dept: FAMILY MEDICINE CLINIC | Facility: CLINIC | Age: 69
End: 2019-04-02

## 2019-04-02 VITALS
HEART RATE: 80 BPM | HEIGHT: 62 IN | WEIGHT: 154 LBS | SYSTOLIC BLOOD PRESSURE: 120 MMHG | BODY MASS INDEX: 28.34 KG/M2 | OXYGEN SATURATION: 98 % | DIASTOLIC BLOOD PRESSURE: 70 MMHG

## 2019-04-02 DIAGNOSIS — Z13.6 SCREENING FOR CARDIOVASCULAR CONDITION: ICD-10-CM

## 2019-04-02 DIAGNOSIS — K22.89 ESOPHAGEAL PAIN: ICD-10-CM

## 2019-04-02 DIAGNOSIS — Z23 NEED FOR VACCINATION: Primary | ICD-10-CM

## 2019-04-02 LAB
ALBUMIN SERPL-MCNC: 4.8 G/DL (ref 3.5–5.2)
ALBUMIN/GLOB SERPL: 2.1 G/DL
ALP SERPL-CCNC: 73 U/L (ref 39–117)
ALT SERPL-CCNC: 26 U/L (ref 1–33)
AST SERPL-CCNC: 24 U/L (ref 1–32)
BILIRUB SERPL-MCNC: 0.2 MG/DL (ref 0.2–1.2)
BUN SERPL-MCNC: 21 MG/DL (ref 8–23)
BUN/CREAT SERPL: 20.2 (ref 7–25)
CALCIUM SERPL-MCNC: 11 MG/DL (ref 8.6–10.5)
CHLORIDE SERPL-SCNC: 105 MMOL/L (ref 98–107)
CHOLEST SERPL-MCNC: 256 MG/DL (ref 0–200)
CO2 SERPL-SCNC: 28.2 MMOL/L (ref 22–29)
CREAT SERPL-MCNC: 1.04 MG/DL (ref 0.57–1)
GLOBULIN SER CALC-MCNC: 2.3 GM/DL
GLUCOSE SERPL-MCNC: 91 MG/DL (ref 65–99)
HDLC SERPL-MCNC: 51 MG/DL (ref 40–60)
LDLC SERPL CALC-MCNC: 170 MG/DL (ref 0–100)
LDLC/HDLC SERPL: 3.33 {RATIO}
POTASSIUM SERPL-SCNC: 5.4 MMOL/L (ref 3.5–5.2)
PROT SERPL-MCNC: 7.1 G/DL (ref 6–8.5)
SODIUM SERPL-SCNC: 142 MMOL/L (ref 136–145)
TRIGL SERPL-MCNC: 175 MG/DL (ref 0–150)
VLDLC SERPL CALC-MCNC: 35 MG/DL

## 2019-04-02 PROCEDURE — 90732 PPSV23 VACC 2 YRS+ SUBQ/IM: CPT | Performed by: NURSE PRACTITIONER

## 2019-04-02 PROCEDURE — G0009 ADMIN PNEUMOCOCCAL VACCINE: HCPCS | Performed by: NURSE PRACTITIONER

## 2019-04-02 PROCEDURE — G0439 PPPS, SUBSEQ VISIT: HCPCS | Performed by: NURSE PRACTITIONER

## 2019-04-02 RX ORDER — LEVOCETIRIZINE DIHYDROCHLORIDE 5 MG/1
5 TABLET, FILM COATED ORAL DAILY
Refills: 11 | COMMUNITY
Start: 2019-03-22 | End: 2022-05-27

## 2019-04-02 NOTE — PROGRESS NOTES
Medicare Subsequent Wellness Visit  Subjective She is concerned about incontinence.   History of Present Illness  Is a Dr Weaver pt.    Leny Neumann is a 68 y.o. female who presents for an Medicare Wellness Visit. In addition, we addressed the following health issues:      PMH, PSH, SocHx, FamHx, Allergies, and Medications: Reviewed and updated in the history section of chart.  Family History   Problem Relation Age of Onset   • Breast cancer Mother 68   • Lung cancer Father 64   • Heart failure Father    • Heart attack Father    • Alcohol abuse Daughter    • Early death Maternal Grandfather 35        farm accident       Social History     Social History Narrative    Lives in a place of her own with her 2 dogs.  Works as an .         No Known Allergies    Outpatient Medications Prior to Visit   Medication Sig Dispense Refill   • amoxicillin (AMOXIL) 875 MG tablet Take 1 tablet by mouth 2 (Two) Times a Day. 20 tablet 0   • [employer] FIBER GUMMIES PO Take 2 tablets by mouth.     • Multiple Vitamins-Minerals (MULTIVITAMIN WITH MINERALS) tablet tablet Take 1 tablet by mouth Daily.     • pravastatin (PRAVACHOL) 20 MG tablet TAKE 1 TABLET BY MOUTH EVERY DAY 30 tablet 5     No facility-administered medications prior to visit.         Patient Active Problem List   Diagnosis   • Hyperlipidemia   • Osteopenia   • Colon polyp   • Allergic   • Hypothyroidism   • Tinnitus of both ears   • Onychomycosis   • Other fatigue   • Tinea pedis   • Menopausal hot flushes   • Screening for colon cancer         Patient Care Team:  Salome Weaver MD as PCP - General (Family Medicine)  eLxy Rivera APRN as PCP - Claims Attributed  Health Habits:  Current Diet: Well balanced diet  Dental Exam.   Eye Exam.   Exercise:  Current exercise activities include:    Recent Hospitalizations:  none    Age-appropriate Screening Schedule:  Refer to the list below for future screening recommendations based on patient's age. Orders  for these recommended tests are listed in the plan section. The patient has been provided with a written plan.    Health Maintenance   Topic Date Due   • ZOSTER VACCINE (1 of 2) 07/03/2002   • MEDICARE ANNUAL WELLNESS  03/21/2019   • PNEUMOCOCCAL VACCINES (65+ LOW/MEDIUM RISK) (2 of 2 - PPSV23) 03/21/2019   • LIPID PANEL  03/21/2019   • MAMMOGRAM  06/15/2019   • INFLUENZA VACCINE  08/01/2019   • DXA SCAN  03/28/2020   • TDAP/TD VACCINES (3 - Td) 08/19/2022   • COLONOSCOPY  08/16/2023   • HEPATITIS C SCREENING  Completed       Depression Screen:   PHQ-2/PHQ-9 Depression Screening 3/21/2018   Little interest or pleasure in doing things 0   Feeling down, depressed, or hopeless 1   Total Score 1       Functional and Cognitive Screening:  Functional & Cognitive Status 3/21/2018   Do you have difficulty preparing food and eating? No   Do you have difficulty bathing yourself, getting dressed or grooming yourself? No   Do you have difficulty using the toilet? No   Do you have difficulty moving around from place to place? No   Do you have trouble with steps or getting out of a bed or a chair? No   In the past year have you fallen or experienced a near fall? No   Current Diet Well Balanced Diet   Dental Exam Up to date   Eye Exam Not up to date   Exercise (times per week) 2 times per week   Current Exercise Activities Include Cardiovasular Workout on Exercise Equipment   Do you need help using the phone?  No   Are you deaf or do you have serious difficulty hearing?  No   Do you need help with transportation? No   Do you need help shopping? No   Do you need help preparing meals?  No   Do you need help with housework?  No   Do you need help with laundry? No   Do you need help taking your medications? No   Do you need help managing money? No   Do you ever drive or ride in a car without wearing a seat belt? No     Does the patient have evidence of cognitive impairment? No      Review of Systems   HENT: Positive for postnasal  drip.        Objective     There were no vitals filed for this visit.    There is no height or weight on file to calculate BMI.    PHYSICAL EXAM  Vitals reviewed and on chart.  HEENT: PERRLA, EOMI. Oral mucosa moist,   No LAD.  CV: RRR, no murmurs, rubs, clicks or gallops  LUNGS: CTA bilaterally  EXT: No edema, FROM in bilateral upper and lower ext  NEURO: CN II - XII grossly intact        ASSESSMENT AND PLAN      Problem List Items Addressed This Visit     None          Orders:  No orders of the defined types were placed in this encounter.      Follow Up:  No Follow-up on file.     ADVANCED DIRECTIVES:     An After Visit Summary and PPPS with all of these plans were given to the patient.

## 2019-04-02 NOTE — PATIENT INSTRUCTIONS
Preventive Care 65 Years and Older, Female  Preventive care refers to lifestyle choices and visits with your health care provider that can promote health and wellness.  What does preventive care include?  · A yearly physical exam. This is also called an annual well check.  · Dental exams once or twice a year.  · Routine eye exams. Ask your health care provider how often you should have your eyes checked.  · Personal lifestyle choices, including:  ? Daily care of your teeth and gums.  ? Regular physical activity.  ? Eating a healthy diet.  ? Avoiding tobacco and drug use.  ? Limiting alcohol use.  ? Practicing safe sex.  ? Taking low-dose aspirin every day.  ? Taking vitamin and mineral supplements as recommended by your health care provider.  What happens during an annual well check?  The services and screenings done by your health care provider during your annual well check will depend on your age, overall health, lifestyle risk factors, and family history of disease.  Counseling  Your health care provider may ask you questions about your:  · Alcohol use.  · Tobacco use.  · Drug use.  · Emotional well-being.  · Home and relationship well-being.  · Sexual activity.  · Eating habits.  · History of falls.  · Memory and ability to understand (cognition).  · Work and work environment.  · Reproductive health.    Screening  You may have the following tests or measurements:  · Height, weight, and BMI.  · Blood pressure.  · Lipid and cholesterol levels. These may be checked every 5 years, or more frequently if you are over 50 years old.  · Skin check.  · Lung cancer screening. You may have this screening every year starting at age 55 if you have a 30-pack-year history of smoking and currently smoke or have quit within the past 15 years.  · Fecal occult blood test (FOBT) of the stool. You may have this test every year starting at age 50.  · Flexible sigmoidoscopy or colonoscopy. You may have a sigmoidoscopy every 5 years or  a colonoscopy every 10 years starting at age 50.  · Hepatitis C blood test.  · Hepatitis B blood test.  · Sexually transmitted disease (STD) testing.  · Diabetes screening. This is done by checking your blood sugar (glucose) after you have not eaten for a while (fasting). You may have this done every 1-3 years.  · Bone density scan. This is done to screen for osteoporosis. You may have this done starting at age 65.  · Mammogram. This may be done every 1-2 years. Talk to your health care provider about how often you should have regular mammograms.    Talk with your health care provider about your test results, treatment options, and if necessary, the need for more tests.  Vaccines  Your health care provider may recommend certain vaccines, such as:  · Influenza vaccine. This is recommended every year.  · Tetanus, diphtheria, and acellular pertussis (Tdap, Td) vaccine. You may need a Td booster every 10 years.  · Varicella vaccine. You may need this if you have not been vaccinated.  · Zoster vaccine. You may need this after age 60.  · Measles, mumps, and rubella (MMR) vaccine. You may need at least one dose of MMR if you were born in 1957 or later. You may also need a second dose.  · Pneumococcal 13-valent conjugate (PCV13) vaccine. One dose is recommended after age 65.  · Pneumococcal polysaccharide (PPSV23) vaccine. One dose is recommended after age 65.  · Meningococcal vaccine. You may need this if you have certain conditions.  · Hepatitis A vaccine. You may need this if you have certain conditions or if you travel or work in places where you may be exposed to hepatitis A.  · Hepatitis B vaccine. You may need this if you have certain conditions or if you travel or work in places where you may be exposed to hepatitis B.  · Haemophilus influenzae type b (Hib) vaccine. You may need this if you have certain conditions.    Talk to your health care provider about which screenings and vaccines you need and how often you  need them.  This information is not intended to replace advice given to you by your health care provider. Make sure you discuss any questions you have with your health care provider.  Document Released: 01/13/2017 Document Revised: 07/09/2018 Document Reviewed: 10/18/2016  [employer] Interactive Patient Education © 2019 [employer] Inc.

## 2019-04-05 ENCOUNTER — PATIENT MESSAGE (OUTPATIENT)
Dept: FAMILY MEDICINE CLINIC | Facility: CLINIC | Age: 69
End: 2019-04-05

## 2019-04-16 ENCOUNTER — TELEPHONE (OUTPATIENT)
Dept: FAMILY MEDICINE CLINIC | Facility: CLINIC | Age: 69
End: 2019-04-16

## 2019-04-16 DIAGNOSIS — E78.2 MIXED HYPERLIPIDEMIA: ICD-10-CM

## 2019-04-16 RX ORDER — PRAVASTATIN SODIUM 40 MG
40 TABLET ORAL DAILY
Qty: 90 TABLET | Refills: 0 | Status: SHIPPED | OUTPATIENT
Start: 2019-04-16 | End: 2019-05-21 | Stop reason: SDUPTHER

## 2019-04-19 DIAGNOSIS — R79.89 ELEVATED SERUM CREATININE: ICD-10-CM

## 2019-04-19 DIAGNOSIS — E83.52 SERUM CALCIUM ELEVATED: Primary | ICD-10-CM

## 2019-04-20 LAB
25(OH)D3+25(OH)D2 SERPL-MCNC: 36.2 NG/ML (ref 30–100)
BUN SERPL-MCNC: 15 MG/DL (ref 8–23)
BUN/CREAT SERPL: 13.4 (ref 7–25)
CALCIUM SERPL-MCNC: 11.2 MG/DL (ref 8.6–10.5)
CHLORIDE SERPL-SCNC: 106 MMOL/L (ref 98–107)
CO2 SERPL-SCNC: 24.6 MMOL/L (ref 22–29)
CREAT SERPL-MCNC: 1.12 MG/DL (ref 0.57–1)
GLUCOSE SERPL-MCNC: 97 MG/DL (ref 65–99)
INTACT PTH: ABNORMAL
POTASSIUM SERPL-SCNC: 5.2 MMOL/L (ref 3.5–5.2)
PTH-INTACT SERPL-MCNC: 84 PG/ML (ref 15–65)
SODIUM SERPL-SCNC: 142 MMOL/L (ref 136–145)

## 2019-04-22 ENCOUNTER — OFFICE VISIT (OUTPATIENT)
Dept: FAMILY MEDICINE CLINIC | Facility: CLINIC | Age: 69
End: 2019-04-22

## 2019-04-22 VITALS
BODY MASS INDEX: 27.42 KG/M2 | HEART RATE: 72 BPM | SYSTOLIC BLOOD PRESSURE: 122 MMHG | OXYGEN SATURATION: 98 % | DIASTOLIC BLOOD PRESSURE: 80 MMHG | HEIGHT: 62 IN | WEIGHT: 149 LBS

## 2019-04-22 DIAGNOSIS — R79.89 ELEVATED PTHRP LEVEL: Primary | ICD-10-CM

## 2019-04-22 DIAGNOSIS — E83.52 HYPERCALCEMIA: ICD-10-CM

## 2019-04-22 PROCEDURE — 99213 OFFICE O/P EST LOW 20 MIN: CPT | Performed by: NURSE PRACTITIONER

## 2019-04-22 NOTE — PROGRESS NOTES
Subjective Leny presents to discuss lab results.   Leny Neumann is a 68 y.o. female.   Pt of Dr Weaver and had abnormal PTH and calcium lab results      History of Present Illness     The following portions of the patient's history were reviewed and updated as appropriate: allergies, current medications, past family history, past medical history, past social history, past surgical history and problem list.    Review of Systems   Constitutional: Positive for fatigue. Negative for activity change and appetite change.   Cardiovascular: Negative for chest pain.   Neurological: Negative for dizziness and headaches.       Objective   Physical Exam   Constitutional: She appears well-developed and well-nourished. No distress.   HENT:   Head: Normocephalic and atraumatic.   Eyes: EOM are normal.   Neck: Neck supple. No thyromegaly present.   Cardiovascular: Normal rate, regular rhythm and normal heart sounds.   Pulmonary/Chest: Effort normal and breath sounds normal.   Musculoskeletal: Normal range of motion.   Neurological: She is alert.   Skin: Skin is warm.   Nursing note and vitals reviewed.      Assessment/Plan   Leny was seen today for abnormal lab.    Diagnoses and all orders for this visit:    Elevated PTHrP level    Hypercalcemia      We discussed hyperparathyroidism and hypercalcemia and she is being referred to endocrinology by Dr Weaver .

## 2019-04-29 NOTE — PATIENT INSTRUCTIONS
Parathyroid Hormone Test  Why am I having this test?  The parathyroid hormone (PTH) test may be used to evaluate the function of your parathyroid glands and to check for or monitor conditions that affect the calcium levels in your body. The parathyroid glands are four tiny glands that surround the larger thyroid gland in your neck. When your blood calcium levels are low, your parathyroid glands release PTH into the blood. This causes several changes in your body that result in an increase in your blood calcium level. When blood calcium levels are normal or too high, blood PTH levels decrease.  Your health care provider may want you to have this test if you have:  · Signs of higher-than-normal blood calcium levels (hypercalcemia).  · Signs of lower-than-normal blood calcium levels (hypocalcemia).  · A kidney infection.  · Kidney disease.  · Certain cancer cells that secrete PTH.    What is being tested?  This test measures the amount of PTH in your blood.  What kind of sample is taken?  A blood sample is required for this test. It is usually collected by inserting a needle into a blood vessel or by sticking a finger with a small needle.  How do I prepare for this test?  Do not eat or drink anything after midnight on the night before the test or as told by your health care provider.  How are the results reported?  Your test results will be reported as values that indicate the amount of PTH in your blood. Your health care provider will compare your results to normal ranges that were established after testing a large group of people (reference ranges). The results may include amounts for whole PTH and its fragments (PTH N-terminal and PTH C-terminal). Reference ranges may vary among labs and hospitals. For this test, common reference ranges are:  · PTH intact (whole): 10-65 pg/mL or 10-65 ng/L (SI units).  · PTH N-terminal: 8-24 pg/mL or 8-24 ng/L (SI units).  · PTH C-terminal:  pg/mL or  ng/L (SI  units).    What do the results mean?  Test results that are higher than the reference range may mean that you have:  · Overactive parathyroid glands (hyperparathyroidism).  · A non-parathyroid PTH-producing tumor.  · A kidney defect that is present at birth (congenital).  · Hypocalcemia.  · Long-term (chronic) kidney failure.  · Malabsorption syndrome. This means that your body does not properly absorb nutrients such as calcium from the intestinal tract.  · Vitamin D deficiency. Adequate vitamin D levels are required for calcium to be absorbed from the intestinal tract. Vitamin D deficiency can lead to low levels of calcium in the blood.    Test results that are lower than the reference range may indicate any of the following:  · Underactive parathyroid glands (hypoparathyroidism).  · Hypercalcemia.  · Bone tumor.  · A disease that causes inflammation in your organs and other areas of your body (sarcoidosis).  · Vitamin D intoxication.  · Milk-alkali syndrome.  · An immune system disorder (DiGeorge syndrome).    Talk with your health care provider about what your results mean.  Questions to ask your health care provider  Ask your health care provider, or the department that is doing the test:  · When will my results be ready?  · How will I get my results?  · What are my treatment options?  · What other tests do I need?  · What are my next steps?    Summary  · The parathyroid hormone (PTH) test may be used to evaluate the function of your parathyroid glands and to check for or monitor conditions that affect the calcium levels in your body.  · The parathyroid glands are four tiny glands that surround the larger thyroid gland in your neck.  · When your blood calcium levels are low, your parathyroid glands release PTH into the blood.  · Talk with your health care provider about what your results mean.  This information is not intended to replace advice given to you by your health care provider. Make sure you discuss any  questions you have with your health care provider.  Document Released: 01/20/2006 Document Revised: 08/13/2018 Document Reviewed: 08/13/2018  [employer] Interactive Patient Education © 2019 [employer]

## 2019-05-21 RX ORDER — PRAVASTATIN SODIUM 40 MG
40 TABLET ORAL DAILY
Qty: 90 TABLET | Refills: 0 | Status: SHIPPED | OUTPATIENT
Start: 2019-05-21 | End: 2019-11-14 | Stop reason: SDUPTHER

## 2019-05-23 ENCOUNTER — TELEPHONE (OUTPATIENT)
Dept: FAMILY MEDICINE CLINIC | Facility: CLINIC | Age: 69
End: 2019-05-23

## 2019-05-23 RX ORDER — ONDANSETRON 4 MG/1
4 TABLET, FILM COATED ORAL EVERY 8 HOURS PRN
Qty: 12 TABLET | Refills: 0 | Status: SHIPPED | OUTPATIENT
Start: 2019-05-23 | End: 2020-08-27

## 2019-09-11 ENCOUNTER — OFFICE VISIT (OUTPATIENT)
Dept: FAMILY MEDICINE CLINIC | Facility: CLINIC | Age: 69
End: 2019-09-11

## 2019-09-11 ENCOUNTER — HOSPITAL ENCOUNTER (OUTPATIENT)
Dept: GENERAL RADIOLOGY | Facility: HOSPITAL | Age: 69
Discharge: HOME | End: 2019-09-11

## 2019-09-11 ENCOUNTER — HOSPITAL ENCOUNTER (OUTPATIENT)
Dept: GENERAL RADIOLOGY | Facility: HOSPITAL | Age: 69
Discharge: HOME | End: 2019-09-11
Admitting: NURSE PRACTITIONER

## 2019-09-11 VITALS
OXYGEN SATURATION: 98 % | WEIGHT: 158 LBS | HEART RATE: 75 BPM | DIASTOLIC BLOOD PRESSURE: 78 MMHG | BODY MASS INDEX: 29.08 KG/M2 | HEIGHT: 62 IN | RESPIRATION RATE: 16 BRPM | SYSTOLIC BLOOD PRESSURE: 114 MMHG

## 2019-09-11 DIAGNOSIS — S99.922A INJURY OF ANKLE AND FOOT, LEFT, INITIAL ENCOUNTER: ICD-10-CM

## 2019-09-11 DIAGNOSIS — S99.912A INJURY OF ANKLE AND FOOT, LEFT, INITIAL ENCOUNTER: ICD-10-CM

## 2019-09-11 DIAGNOSIS — S99.912A INJURY OF ANKLE AND FOOT, LEFT, INITIAL ENCOUNTER: Primary | ICD-10-CM

## 2019-09-11 DIAGNOSIS — S99.922A INJURY OF ANKLE AND FOOT, LEFT, INITIAL ENCOUNTER: Primary | ICD-10-CM

## 2019-09-11 PROCEDURE — 99213 OFFICE O/P EST LOW 20 MIN: CPT | Performed by: NURSE PRACTITIONER

## 2019-09-11 PROCEDURE — 73610 X-RAY EXAM OF ANKLE: CPT

## 2019-09-11 PROCEDURE — 73630 X-RAY EXAM OF FOOT: CPT

## 2019-09-11 RX ORDER — OMEPRAZOLE 40 MG/1
20 CAPSULE, DELAYED RELEASE ORAL AS NEEDED
Refills: 3 | COMMUNITY
Start: 2019-08-17

## 2019-09-11 NOTE — PROGRESS NOTES
Subjective foot pain  Leny Neumann is a 69 y.o. female.     History of Present Illness   Pt is here for foot pain and swelling. She states 3 weeks she slept and fall, and her body weight went over her L foot. She is been having pain, on and off, and swelling.   Pain is not getting better she describes it as a nagging pain  The following portions of the patient's history were reviewed and updated as appropriate: allergies, current medications, past family history, past medical history, past social history, past surgical history and problem list.    Review of Systems   Constitutional: Positive for activity change.   Musculoskeletal: Positive for gait problem and myalgias.   Skin: Negative for color change.       Objective   Physical Exam   Constitutional: She appears well-developed and well-nourished. No distress.   HENT:   Head: Normocephalic and atraumatic.   Eyes: EOM are normal.   Neck: Neck supple. No thyromegaly present.   Cardiovascular: Normal rate, regular rhythm and normal heart sounds.   Pulmonary/Chest: Effort normal and breath sounds normal.   Musculoskeletal: Normal range of motion. She exhibits tenderness.   Pain at left 5th MT base   Neurological: She is alert.   Skin: Skin is warm.   Nursing note and vitals reviewed.      Assessment/Plan   Leny was seen today for foot pain.    Diagnoses and all orders for this visit:    Injury of ankle and foot, left, initial encounter  -     XR Ankle 3+ View Left; Future  -     XR Foot 3+ View Left; Future

## 2019-09-11 NOTE — PATIENT INSTRUCTIONS
Ankle Pain    Many things can cause ankle pain, including an injury to the area and overuse of the ankle. The ankle joint holds your body weight and allows you to move around. Ankle pain can occur on either side or the back of one ankle or both ankles. Ankle pain may be sharp and burning or dull and aching. There may be tenderness, stiffness, redness, or warmth around the ankle.  Follow these instructions at home:  Activity  · Rest your ankle as told by your health care provider. Avoid any activities that cause ankle pain.  · Do exercises as told by your health care provider.  · Ask your health care provider if you can drive.  Using a brace, a bandage, or crutches  · If you were given a brace:  ? Wear it as told by your health care provider.  ? Remove it when you take a bath or a shower.  ? Try not to move your ankle very much, but wiggle your toes from time to time. This helps to prevent swelling.  · If you were given an elastic bandage:  ? Remove it when you take a bath or a shower.  ? Try not to move your ankle very much, but wiggle your toes from time to time. This helps to prevent swelling.  ? Adjust the bandage to make it more comfortable if it feels too tight.  ? Loosen the bandage if you have numbness or tingling in your foot or if your foot turns cold and blue.  · If you have crutches, use them as told by your health care provider. Continue to use them until you can walk without feeling pain in your ankle.  Managing pain, stiffness, and swelling  · Raise (elevate) your ankle above the level of your heart while you are sitting or lying down.  · If directed, apply ice to the area:  ? Put ice in a plastic bag.  ? Place a towel between your skin and the bag.  ? Leave the ice on for 20 minutes, 2-3 times per day.  General instructions  · Keep all follow-up visits as told by your health care provider. This is important.  · Record this information that may be helpful for you and your health care provider:  ? How  often you have ankle pain.  ? Where the pain is located.  ? What the pain feels like.  · Take over-the-counter and prescription medicines only as told by your health care provider.  Contact a health care provider if:  · Your pain gets worse.  · Your pain is not relieved with medicines.  · You have a fever or chills.  · You are having more trouble with walking.  · You have new symptoms.  Get help right away if:  · Your foot, leg, toes, or ankle tingles or becomes numb.  · Your foot, leg, toes, or ankle becomes swollen.  · Your foot, leg, toes, or ankle turns pale or blue.  This information is not intended to replace advice given to you by your health care provider. Make sure you discuss any questions you have with your health care provider.  Document Released: 06/07/2011 Document Revised: 08/18/2017 Document Reviewed: 07/19/2016  [employer] Interactive Patient Education © 2019 [employer]

## 2019-09-12 DIAGNOSIS — S92.902A FRACTURE, FOOT, LEFT, CLOSED, INITIAL ENCOUNTER: Primary | ICD-10-CM

## 2019-10-09 ENCOUNTER — OFFICE VISIT (OUTPATIENT)
Dept: FAMILY MEDICINE CLINIC | Facility: CLINIC | Age: 69
End: 2019-10-09

## 2019-10-09 VITALS
DIASTOLIC BLOOD PRESSURE: 80 MMHG | HEART RATE: 70 BPM | SYSTOLIC BLOOD PRESSURE: 130 MMHG | BODY MASS INDEX: 30.4 KG/M2 | HEIGHT: 61 IN | WEIGHT: 161 LBS | OXYGEN SATURATION: 99 %

## 2019-10-09 DIAGNOSIS — N95.1 MENOPAUSAL SYMPTOMS: ICD-10-CM

## 2019-10-09 DIAGNOSIS — K22.89 ESOPHAGEAL PAIN: Primary | ICD-10-CM

## 2019-10-09 PROCEDURE — 99213 OFFICE O/P EST LOW 20 MIN: CPT | Performed by: NURSE PRACTITIONER

## 2019-10-09 NOTE — PATIENT INSTRUCTIONS
Esophagitis    Esophagitis is inflammation of the esophagus. The esophagus is the tube that carries food and liquids from your mouth to your stomach. Esophagitis can cause soreness or pain in the esophagus. This condition can make it difficult and painful to swallow.  What are the causes?  Most causes of esophagitis are not serious. Common causes of this condition include:  · Gastroesophageal reflux disease (GERD). This is when stomach contents move back up into the esophagus (reflux).  · Repeated vomiting.  · An allergic-type reaction, especially caused by food allergies (eosinophilic esophagitis).  · Injury to the esophagus by swallowing large pills with or without water, or swallowing certain types of medicines.  · Swallowing (ingesting) harmful chemicals, such as household cleaning products.  · Heavy alcohol use.  · An infection of the esophagus. This most often occurs in people who have a weakened immune system.  · Radiation or chemotherapy treatment for cancer.  · Certain diseases such as sarcoidosis, Crohn disease, and scleroderma.  What are the signs or symptoms?  Symptoms of this condition include:  · Difficult or painful swallowing.  · Pain with swallowing acidic liquids, such as citrus juices.  · Pain with burping.  · Chest pain.  · Difficulty breathing.  · Nausea.  · Vomiting.  · Pain in the abdomen.  · Weight loss.  · Ulcers in the mouth.  · Patches of white material in the mouth (candidiasis).  · Fever.  · Coughing up blood or vomiting blood.  · Stool that is black, tarry, or bright red.  How is this diagnosed?  Your health care provider will take a medical history and perform a physical exam. You may also have other tests, including:  · An endoscopy to examine your stomach and esophagus with a small camera.  · A test that measures the acidity level in your esophagus.  · A test that measures how much pressure is on your esophagus.  · A barium swallow or modified barium swallow to show the shape, size,  and functioning of your esophagus.  · Allergy tests.  How is this treated?  Treatment for this condition depends on the cause of your esophagitis. In some cases, steroids or other medicines may be given to help relieve your symptoms or to treat the underlying cause of your condition. You may have to make some lifestyle changes, such as:  · Avoiding alcohol.  · Quitting smoking.  · Changing your diet.  · Exercising.  · Changing your sleep habits and your sleep environment.  Follow these instructions at home:  Take these actions to decrease your discomfort and to help avoid complications.  Diet  · Follow a diet as recommended by your health care provider. This may involve avoiding foods and drinks such as:  ? Coffee and tea (with or without caffeine).  ? Drinks that contain alcohol.  ? Energy drinks and sports drinks.  ? Carbonated drinks or sodas.  ? Chocolate and cocoa.  ? Peppermint and mint flavorings.  ? Garlic and onions.  ? Horseradish.  ? Spicy and acidic foods, including peppers, chili powder, ray powder, vinegar, hot sauces, and barbecue sauce.  ? Citrus fruit juices and citrus fruits, such as oranges, dee, and limes.  ? Tomato-based foods, such as red sauce, chili, salsa, and pizza with red sauce.  ? Fried and fatty foods, such as donuts, french fries, potato chips, and high-fat dressings.  ? High-fat meats, such as hot dogs and fatty cuts of red and white meats, such as rib eye steak, sausage, ham, and mcdaniel.  ? High-fat dairy items, such as whole milk, butter, and cream cheese.  · Eat small, frequent meals instead of large meals.  · Avoid drinking large amounts of liquid with your meals.  · Avoid eating meals during the 2-3 hours before bedtime.  · Avoid lying down right after you eat.  · Do not exercise right after you eat.  · Avoid foods and drinks that seem to make your symptoms worse.  General instructions  · Pay attention to any changes in your symptoms.  · Take over-the-counter and prescription  medicines only as told by your health care provider. Do not take aspirin, ibuprofen, or other NSAIDs unless your health care provider told you to do so.  · If you have trouble taking pills, use a pill splitter to decrease the size of the pill. This will decrease the chance of the pill getting stuck or injuring your esophagus on the way down. Also, drink water after you take a pill.  · Do not use any tobacco products, including cigarettes, chewing tobacco, and e-cigarettes. If you need help quitting, ask your health care provider.  · Wear loose-fitting clothing. Do not wear anything tight around your waist that causes pressure on your abdomen.  · Raise (elevate) the head of your bed about 6 inches (15 cm).  · Try to reduce your stress, such as with yoga or meditation. If you need help reducing stress, ask your health care provider.  · If you are overweight, reduce your weight to an amount that is healthy for you. Ask your health care provider for guidance about a safe weight loss goal.  · Keep all follow-up visits as told by your health care provider. This is important.  Contact a health care provider if:  · You have new symptoms.  · You have unexplained weight loss.  · You have difficulty swallowing, or it hurts to swallow.  · You have wheezing or a persistent cough.  · Your symptoms do not improve with treatment.  · You have frequent heartburn for more than two weeks.  Get help right away if:  · You have severe pain in your arms, neck, jaw, teeth, or back.  · You feel sweaty, dizzy, or light-headed.  · You have chest pain or shortness of breath.  · You vomit and your vomit looks like blood or coffee grounds.  · Your stool is bloody or black.  · You have a fever.  · You cannot swallow, drink, or eat.  This information is not intended to replace advice given to you by your health care provider. Make sure you discuss any questions you have with your health care provider.  Document Released: 01/25/2006 Document  Revised: 05/25/2017 Document Reviewed: 04/13/2016  [employer] Interactive Patient Education © 2019 [employer]

## 2019-10-09 NOTE — PROGRESS NOTES
Subjective neck pain  Leny Neumann is a 69 y.o. female.     History of Present Illness   She has some issues with esophageal pain and was sent by an allergist and told that she was ok, and she was placed on a proton pump inhibitor and it helped her gerd but not the esophageal  Pain.     The following portions of the patient's history were reviewed and updated as appropriate: allergies, current medications, past family history, past medical history, past social history, past surgical history and problem list.    Review of Systems   Musculoskeletal:        Neck pain        Objective   Physical Exam   Constitutional: She is oriented to person, place, and time. She appears well-developed and well-nourished.   HENT:   Head: Normocephalic and atraumatic.   Right Ear: External ear normal.   Left Ear: External ear normal.   Mouth/Throat: Oropharynx is clear and moist.   Eyes: Conjunctivae and EOM are normal. Pupils are equal, round, and reactive to light.   Neck: Neck supple.   Cardiovascular: Normal rate and regular rhythm.   Pulmonary/Chest: Effort normal and breath sounds normal. No respiratory distress.   Abdominal: Bowel sounds are normal.   Musculoskeletal: Normal range of motion.   Lymphadenopathy:     She has no cervical adenopathy.   Neurological: She is alert and oriented to person, place, and time.   Skin: Skin is warm and dry.   Psychiatric: She has a normal mood and affect.   Nursing note and vitals reviewed.        Assessment/Plan   esophageal pain  Ct soft tissue of neck ordered.

## 2019-10-14 ENCOUNTER — TELEPHONE (OUTPATIENT)
Dept: OBSTETRICS AND GYNECOLOGY | Age: 69
End: 2019-10-14

## 2019-10-14 NOTE — TELEPHONE ENCOUNTER
Pt scheduled for 12/26/19 at 915 w Dr Brown.    Referral received for new gyn pt needing to est care for monopause symptoms.     Referred by:  Zora Casillas    Requesting Dr Brown.

## 2019-11-14 RX ORDER — PRAVASTATIN SODIUM 40 MG
40 TABLET ORAL DAILY
Qty: 90 TABLET | Refills: 0 | Status: SHIPPED | OUTPATIENT
Start: 2019-11-14 | End: 2020-02-11

## 2019-12-06 ENCOUNTER — TELEPHONE (OUTPATIENT)
Dept: OBSTETRICS AND GYNECOLOGY | Age: 69
End: 2019-12-06

## 2019-12-06 NOTE — TELEPHONE ENCOUNTER
S/w new Dr Brown pt whom is aware Dr Brown's schedule for 12/26/19 has changed. Pt will call back to reschedule her NGYN pt appt w Dr Brown. Plz offer possible 30 mins of gyn spots as Dr Brown doesn't have any NGYN spots any time soon as we are inconveniencing pt.

## 2020-01-20 DIAGNOSIS — R93.89 ABNORMAL FINDING ON CT SCAN: Primary | ICD-10-CM

## 2020-01-23 ENCOUNTER — OFFICE VISIT (OUTPATIENT)
Dept: OBSTETRICS AND GYNECOLOGY | Age: 70
End: 2020-01-23

## 2020-01-23 VITALS
DIASTOLIC BLOOD PRESSURE: 72 MMHG | BODY MASS INDEX: 32.74 KG/M2 | HEIGHT: 61 IN | WEIGHT: 173.4 LBS | SYSTOLIC BLOOD PRESSURE: 132 MMHG

## 2020-01-23 DIAGNOSIS — R61 NIGHT SWEATS: ICD-10-CM

## 2020-01-23 DIAGNOSIS — N95.1 MENOPAUSAL HOT FLUSHES: Primary | ICD-10-CM

## 2020-01-23 DIAGNOSIS — N39.3 SUI (STRESS URINARY INCONTINENCE, FEMALE): ICD-10-CM

## 2020-01-23 PROCEDURE — 99203 OFFICE O/P NEW LOW 30 MIN: CPT | Performed by: OBSTETRICS & GYNECOLOGY

## 2020-01-23 RX ORDER — PAROXETINE 10 MG/1
10 TABLET, FILM COATED ORAL EVERY MORNING
Qty: 30 TABLET | Refills: 11 | Status: SHIPPED | OUTPATIENT
Start: 2020-01-23 | End: 2022-05-27

## 2020-01-23 NOTE — PROGRESS NOTES
"Subjective     Chief Complaint   Patient presents with   • Gynecologic Exam     New gyn, Mg 06/15/2018, Dexa 2018, Colonoscopy 2018, c/o night sweats   • Establish Care       Leny Neumann is a 69 y.o.  whose LMP is No LMP recorded. Patient is postmenopausal. presents hot flashes and night sweats. The night sweats are terrible (clothing drenched) but it might have worsened with a new \"foam mattress\" that seems to cause her to sleep hot. She is interested in hormonal treatment. She is not sexually active now. 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      No Additional Complaints Reported    The following portions of the patient's history were reviewed and updated as appropriate:vital signs, allergies, current medications, past family history, past medical history, past social history, past surgical history and problem list      Review of Systems   A comprehensive review of systems was negative except for: Genitourinary: positive for MARIYA, Endocrine: hot flashes and night sweats     Objective      /72   Ht 154.9 cm (61\")   Wt 78.7 kg (173 lb 6.4 oz)   Breastfeeding No   BMI 32.76 kg/m²     Physical Exam    General:   alert, appears stated age and no distress   Heart:    Lungs:    Breast:    Neck:    Abdomen:    CVA:    Pelvis:    Extremities: Not performed today   Neurologic: negative   Psychiatric: Normal affect, judgement, and mood       Lab Review   Labs: pap 3/2018 normal     Imaging   No data reviewed    Assessment/Plan     ASSESSMENT  1. Menopausal hot flushes    2. Night sweats    3. MARIYA (stress urinary incontinence, female)        PLAN  Offered treatment w/ estrogen and progesterone (estrace " patch and oral prometrium 100 mg dialy) for highly effective relief of hot flashes and night sweats. Reviewed risks/benefits- slight increased risk of DVT,PE, stroke and breast cancer. Pt very concerned about the increased risk of breast cancer as her mother had breast cancer. She now declines estrogen and progesterone. She will try a non hormonal option. She understands that is does not work as well as estrogen.    2. Medications prescribed this encounter:        New Medications Ordered This Visit   Medications   • PARoxetine (PAXIL) 10 MG tablet     Sig: Take 1 tablet by mouth Every Morning.     Dispense:  30 tablet     Refill:  11     Discussed treatment options for MARIYA. Would recommend kegel exercises, possibly w/ PT. Best treatment is surgical with suburethral sling. Can offer urogyn referral. Discussed risks of procedures using mesh. Pt declines urogyn consult at this time.      Follow up: for annual 9/2020 with mammogram and DEXA here    Glo Brown MD  1/24/2020

## 2020-01-31 ENCOUNTER — HOSPITAL ENCOUNTER (OUTPATIENT)
Dept: CT IMAGING | Facility: HOSPITAL | Age: 70
Discharge: HOME | End: 2020-01-31
Admitting: NURSE PRACTITIONER

## 2020-01-31 DIAGNOSIS — R93.89 ABNORMAL FINDING ON CT SCAN: ICD-10-CM

## 2020-01-31 LAB — CREAT BLDA-MCNC: 1 MG/DL (ref 0.6–1.3)

## 2020-01-31 PROCEDURE — 25010000002 IOPAMIDOL 61 % SOLUTION: Performed by: NURSE PRACTITIONER

## 2020-01-31 PROCEDURE — 82565 ASSAY OF CREATININE: CPT

## 2020-01-31 PROCEDURE — 70491 CT SOFT TISSUE NECK W/DYE: CPT

## 2020-01-31 RX ADMIN — IOPAMIDOL 75 ML: 612 INJECTION, SOLUTION INTRAVENOUS at 17:20

## 2020-02-11 RX ORDER — PRAVASTATIN SODIUM 40 MG
TABLET ORAL
Qty: 90 TABLET | Refills: 0 | Status: SHIPPED | OUTPATIENT
Start: 2020-02-11 | End: 2020-05-11

## 2020-05-10 DIAGNOSIS — E78.2 MIXED HYPERLIPIDEMIA: Primary | ICD-10-CM

## 2020-05-11 RX ORDER — PRAVASTATIN SODIUM 40 MG
TABLET ORAL
Qty: 90 TABLET | Refills: 0 | Status: SHIPPED | OUTPATIENT
Start: 2020-05-11 | End: 2020-08-10

## 2020-08-10 DIAGNOSIS — E78.2 MIXED HYPERLIPIDEMIA: ICD-10-CM

## 2020-08-10 RX ORDER — PRAVASTATIN SODIUM 40 MG
TABLET ORAL
Qty: 30 TABLET | Refills: 0 | Status: SHIPPED | OUTPATIENT
Start: 2020-08-10 | End: 2020-09-02

## 2020-08-27 ENCOUNTER — OFFICE VISIT (OUTPATIENT)
Dept: FAMILY MEDICINE CLINIC | Facility: CLINIC | Age: 70
End: 2020-08-27

## 2020-08-27 VITALS
RESPIRATION RATE: 16 BRPM | DIASTOLIC BLOOD PRESSURE: 70 MMHG | OXYGEN SATURATION: 97 % | TEMPERATURE: 96.6 F | HEIGHT: 61 IN | WEIGHT: 162 LBS | HEART RATE: 72 BPM | BODY MASS INDEX: 30.58 KG/M2 | SYSTOLIC BLOOD PRESSURE: 140 MMHG

## 2020-08-27 DIAGNOSIS — J30.2 SEASONAL ALLERGIC RHINITIS, UNSPECIFIED TRIGGER: ICD-10-CM

## 2020-08-27 DIAGNOSIS — L23.7 POISON IVY: Primary | ICD-10-CM

## 2020-08-27 PROCEDURE — 99213 OFFICE O/P EST LOW 20 MIN: CPT | Performed by: NURSE PRACTITIONER

## 2020-08-27 RX ORDER — TRIAMCINOLONE ACETONIDE 1 MG/G
CREAM TOPICAL 2 TIMES DAILY
Qty: 45 G | Refills: 0 | Status: SHIPPED | OUTPATIENT
Start: 2020-08-27 | End: 2020-09-06

## 2020-08-27 NOTE — PATIENT INSTRUCTIONS
Allergic Rhinitis, Adult  Allergic rhinitis is an allergic reaction that affects the mucous membrane inside the nose. It causes sneezing, a runny or stuffy nose, and the feeling of mucus going down the back of the throat (postnasal drip). Allergic rhinitis can be mild to severe.  There are two types of allergic rhinitis:  · Seasonal. This type is also called hay fever. It happens only during certain seasons.  · Perennial. This type can happen at any time of the year.  What are the causes?  This condition happens when the body's defense system (immune system) responds to certain harmless substances called allergens as though they were germs.   Seasonal allergic rhinitis is triggered by pollen, which can come from grasses, trees, and weeds. Perennial allergic rhinitis may be caused by:  · House dust mites.  · Pet dander.  · Mold spores.  What are the signs or symptoms?  Symptoms of this condition include:  · Sneezing.  · Runny or stuffy nose (nasal congestion).  · Postnasal drip.  · Itchy nose.  · Tearing of the eyes.  · Trouble sleeping.  · Daytime sleepiness.  How is this diagnosed?  This condition may be diagnosed based on:  · Your medical history.  · A physical exam.  · Tests to check for related conditions, such as:  ? Asthma.  ? Pink eye.  ? Ear infection.  ? Upper respiratory infection.  · Tests to find out which allergens trigger your symptoms. These may include skin or blood tests.  How is this treated?  There is no cure for this condition, but treatment can help control symptoms. Treatment may include:  · Taking medicines that block allergy symptoms, such as antihistamines. Medicine may be given as a shot, nasal spray, or pill.  · Avoiding the allergen.  · Desensitization. This treatment involves getting ongoing shots until your body becomes less sensitive to the allergen. This treatment may be done if other treatments do not help.  · If taking medicine and avoiding the allergen does not work, new, stronger  medicines may be prescribed.  Follow these instructions at home:  · Find out what you are allergic to. Common allergens include smoke, dust, and pollen.  · Avoid the things you are allergic to. These are some things you can do to help avoid allergens:  ? Replace carpet with wood, tile, or vinyl giselle. Carpet can trap dander and dust.  ? Do not smoke. Do not allow smoking in your home.  ? Change your heating and air conditioning filter at least once a month.  ? During allergy season:  § Keep windows closed as much as possible.  § Plan outdoor activities when pollen counts are lowest. This is usually during the evening hours.  § When coming indoors, change clothing and shower before sitting on furniture or bedding.  · Take over-the-counter and prescription medicines only as told by your health care provider.  · Keep all follow-up visits as told by your health care provider. This is important.  Contact a health care provider if:  · You have a fever.  · You develop a persistent cough.  · You make whistling sounds when you breathe (you wheeze).  · Your symptoms interfere with your normal daily activities.  Get help right away if:  · You have shortness of breath.  Summary  · This condition can be managed by taking medicines as directed and avoiding allergens.  · Contact your health care provider if you develop a persistent cough or fever.  · During allergy season, keep windows closed as much as possible.  This information is not intended to replace advice given to you by your health care provider. Make sure you discuss any questions you have with your health care provider.  Document Released: 09/12/2002 Document Revised: 11/30/2018 Document Reviewed: 01/25/2018  [employer] Patient Education © 2020 [employer]

## 2020-08-27 NOTE — PROGRESS NOTES
Answers for HPI/ROS submitted by the patient on 8/25/2020   What is the primary reason for your visit?: Other  Please describe your symptoms.: Pain in throat/lymph node area of right side of neck.  Daily pain and resulting headaches.  Taking 6 to 10 Tylenol caplets per day to alleviate pain.  Have you had these symptoms before?: Yes  How long have you been having these symptoms?: Greater than 2 weeks  Please list any medications you are currently taking for this condition.: None.  Please describe any probable cause for these symptoms. : No idea.  Subjective   Leny Neumann is a 70 y.o. female.   Throat pain  History of Present Illness   Started about 2 weeks ago, she used to take  A daily antihistamine but stopped a year ago.  She also used to take a nasal spray and stopped that also.  Last night she took Xyzal and reports that her throat pain was gone this morning and she did not need to take any Tylenol.    She also reports that she has what she thinks to be poison ivy on her left arm.  She has been using calamine lotion with minimal results.  The following portions of the patient's history were reviewed and updated as appropriate: allergies, current medications, past family history, past medical history, past social history, past surgical history and problem list.    Review of Systems   Constitutional: Negative for activity change and appetite change.   HENT: Positive for congestion, rhinorrhea, sore throat and swollen glands.    Respiratory: Negative for cough and shortness of breath.    Skin: Positive for rash.   Neurological: Negative for dizziness.       Objective   Physical Exam   Constitutional: She is oriented to person, place, and time. She appears well-developed and well-nourished. No distress.   HENT:   Head: Normocephalic and atraumatic.   Right Ear: External ear normal.   Left Ear: External ear normal.   Mouth/Throat: Oropharynx is clear and moist.   Eyes: Pupils are equal, round, and reactive to  light. Conjunctivae and EOM are normal.   Neck: Neck supple.   Right cervical lymph node swollen.   Cardiovascular: Normal rate and regular rhythm.   Pulmonary/Chest: Effort normal and breath sounds normal. No respiratory distress.   Abdominal: Bowel sounds are normal.   Musculoskeletal: Normal range of motion.   Lymphadenopathy:     She has cervical adenopathy.   Neurological: She is alert and oriented to person, place, and time.   Skin: Skin is warm and dry. Rash noted.   Diffuse linear rash to left forearm   Psychiatric: She has a normal mood and affect.   Nursing note and vitals reviewed.        Assessment/Plan   Problem List Items Addressed This Visit     None      Visit Diagnoses     Poison ivy    -  Primary    Relevant Medications    triamcinolone (KENALOG) 0.1 % cream    Seasonal allergic rhinitis, unspecified trigger            Advised her to start back on her Xyzal daily.  I would like for her to take 1 tablet of Xyzal in the morning and 1 tablet of Benadryl in the evening for the next week.  If there is no improvement I asked her to call the office.       Return in about 6 months (around 2/27/2021).

## 2020-09-01 ENCOUNTER — OFFICE VISIT (OUTPATIENT)
Dept: FAMILY MEDICINE CLINIC | Facility: CLINIC | Age: 70
End: 2020-09-01

## 2020-09-01 VITALS
OXYGEN SATURATION: 98 % | TEMPERATURE: 97.9 F | RESPIRATION RATE: 16 BRPM | SYSTOLIC BLOOD PRESSURE: 128 MMHG | WEIGHT: 162 LBS | HEART RATE: 89 BPM | HEIGHT: 61 IN | DIASTOLIC BLOOD PRESSURE: 68 MMHG | BODY MASS INDEX: 30.58 KG/M2

## 2020-09-01 DIAGNOSIS — L23.7 POISON IVY: Primary | ICD-10-CM

## 2020-09-01 PROCEDURE — 99213 OFFICE O/P EST LOW 20 MIN: CPT | Performed by: NURSE PRACTITIONER

## 2020-09-01 RX ORDER — PREDNISONE 20 MG/1
TABLET ORAL
Qty: 18 TABLET | Refills: 0 | Status: SHIPPED | OUTPATIENT
Start: 2020-09-01 | End: 2022-05-27

## 2020-09-01 NOTE — PROGRESS NOTES
Subjective Poison amna  Leny Neumann is a 70 y.o. female.   Insect Bite    History of Present Illness   Poison ivy that started over a week ago,she was here and treated with steroid cream which helped some but then this morning the rash worsened. She has been using calamine lotion with some improvement with the itching.    The following portions of the patient's history were reviewed and updated as appropriate: allergies, current medications, past family history, past medical history, past social history, past surgical history and problem list.    Review of Systems   Constitutional: Negative for activity change, appetite change and fever.   Respiratory: Negative for cough and shortness of breath.    Cardiovascular: Negative for chest pain and leg swelling.   Skin: Positive for rash.       Objective   Physical Exam   Constitutional: She appears well-nourished. No distress.   Pulmonary/Chest: Effort normal.   Musculoskeletal: Normal range of motion.   Skin: Skin is warm. Rash noted.   Large vesicular patch to left forearm volar aspect.She has some other other spots.   Nursing note and vitals reviewed.        Assessment/Plan   Problem List Items Addressed This Visit     None      Visit Diagnoses     Poison ivy    -  Primary        Prednisone taper for 14 days, continue to use the steroid cream and call for no improvement.       Return if symptoms worsen or fail to improve.

## 2020-09-02 DIAGNOSIS — E78.2 MIXED HYPERLIPIDEMIA: ICD-10-CM

## 2020-09-02 RX ORDER — PRAVASTATIN SODIUM 40 MG
TABLET ORAL
Qty: 30 TABLET | Refills: 0 | Status: SHIPPED | OUTPATIENT
Start: 2020-09-02 | End: 2020-09-28

## 2020-09-25 DIAGNOSIS — E78.2 MIXED HYPERLIPIDEMIA: ICD-10-CM

## 2020-09-28 RX ORDER — PRAVASTATIN SODIUM 40 MG
TABLET ORAL
Qty: 30 TABLET | Refills: 0 | Status: SHIPPED | OUTPATIENT
Start: 2020-09-28 | End: 2020-10-21

## 2020-10-06 ENCOUNTER — OFFICE VISIT (OUTPATIENT)
Dept: FAMILY MEDICINE CLINIC | Facility: CLINIC | Age: 70
End: 2020-10-06

## 2020-10-06 VITALS
RESPIRATION RATE: 16 BRPM | TEMPERATURE: 97.7 F | DIASTOLIC BLOOD PRESSURE: 78 MMHG | SYSTOLIC BLOOD PRESSURE: 130 MMHG | HEART RATE: 88 BPM | WEIGHT: 158 LBS | BODY MASS INDEX: 29.87 KG/M2 | OXYGEN SATURATION: 98 %

## 2020-10-06 DIAGNOSIS — Z00.00 ENCOUNTER FOR SUBSEQUENT ANNUAL WELLNESS VISIT (AWV) IN MEDICARE PATIENT: Primary | ICD-10-CM

## 2020-10-06 PROCEDURE — G0439 PPPS, SUBSEQ VISIT: HCPCS | Performed by: NURSE PRACTITIONER

## 2020-10-06 NOTE — PROGRESS NOTES
The ABCs of the Annual Wellness Visit  Subsequent Medicare Wellness Visit    No chief complaint on file.      Subjective   History of Present Illness:  Leny Neumann is a 70 y.o. female who presents for a Subsequent Medicare Wellness Visit.    HEALTH RISK ASSESSMENT    Recent Hospitalizations:  No hospitalization(s) within the last year.    Current Medical Providers:  Patient Care Team:  Zora Casillas APRN as PCP - General (Family Medicine)  Zora Casillas APRN as PCP - Claims Attributed  Morgan Mark MD as Consulting Physician (Otolaryngology)    Smoking Status:  Social History     Tobacco Use   Smoking Status Former Smoker   • Quit date:    • Years since quittin.7   Smokeless Tobacco Never Used   Tobacco Comment    smoked 1 ppd for 20 yr, quit in , then relapsed for a year in        Alcohol Consumption:  Social History     Substance and Sexual Activity   Alcohol Use Yes    Comment: Rarely       Depression Screen:   PHQ-2/PHQ-9 Depression Screening 10/6/2020   Little interest or pleasure in doing things 0   Feeling down, depressed, or hopeless 0   Total Score 0       Fall Risk Screen:  BASSEM Fall Risk Assessment was completed, and patient is at LOW risk for falls.Assessment completed on:10/6/2020    Health Habits and Functional and Cognitive Screening:  Functional & Cognitive Status 10/6/2020   Do you have difficulty preparing food and eating? No   Do you have difficulty bathing yourself, getting dressed or grooming yourself? No   Do you have difficulty using the toilet? No   Do you have difficulty moving around from place to place? No   Do you have trouble with steps or getting out of a bed or a chair? No   Current Diet Well Balanced Diet   Dental Exam Up to date   Eye Exam Up to date   Exercise (times per week) 3 times per week   Current Exercise Activities Include Walking   Do you need help using the phone?  No   Are you deaf or do you have serious difficulty hearing?  No   Do  you need help with transportation? No   Do you need help shopping? No   Do you need help preparing meals?  No   Do you need help with housework?  No   Do you need help with laundry? No   Do you need help taking your medications? No   Do you need help managing money? No   Do you ever drive or ride in a car without wearing a seat belt? No   Have you felt unusual stress, anger or loneliness in the last month? No   Who do you live with? Alone   If you need help, do you have trouble finding someone available to you? No   Have you been bothered in the last four weeks by sexual problems? No   Do you have difficulty concentrating, remembering or making decisions? No         Does the patient have evidence of cognitive impairment? No  No  Asprin use counseling:Does not need ASA (and currently is not on it)    Age-appropriate Screening Schedule:  Refer to the list below for future screening recommendations based on patient's age, sex and/or medical conditions. Orders for these recommended tests are listed in the plan section. The patient has been provided with a written plan.    Health Maintenance   Topic Date Due   • DXA SCAN  03/28/2020   • LIPID PANEL  04/02/2020   • INFLUENZA VACCINE  08/01/2020   • MAMMOGRAM  09/26/2020   • TDAP/TD VACCINES (3 - Td) 08/19/2022   • COLONOSCOPY  08/16/2023   • ZOSTER VACCINE  Completed          The following portions of the patient's history were reviewed and updated as appropriate: allergies, current medications, past family history, past medical history, past social history, past surgical history and problem list.    Outpatient Medications Prior to Visit   Medication Sig Dispense Refill   • [employer] FIBER GUMMIES PO Take 2 tablets by mouth.     • levocetirizine (XYZAL) 5 MG tablet Take 5 mg by mouth Daily.  11   • Multiple Vitamins-Minerals (MULTIVITAMIN WITH MINERALS) tablet tablet Take 1 tablet by mouth Daily.     • omeprazole (priLOSEC) 40 MG capsule TAKE 1 CAPSULE (40 MG) BY ORAL ROUTE  ONCE DAILY BEFORE A MEAL FOR 4 WEEKS  3   • PARoxetine (PAXIL) 10 MG tablet Take 1 tablet by mouth Every Morning. 30 tablet 11   • pravastatin (PRAVACHOL) 40 MG tablet TAKE 1 TABLET BY MOUTH EVERY DAY 30 tablet 0   • predniSONE (DELTASONE) 20 MG tablet 3 tabs daily for 2 days, 2 tabs daily for 3 days. 1 tablet daily for 5 days,then 1/2 tablet daily for 2 days 18 tablet 0     No facility-administered medications prior to visit.        Patient Active Problem List   Diagnosis   • Hyperlipidemia   • Osteopenia   • Colon polyp   • Allergic   • Hypothyroidism   • Tinnitus of both ears   • Onychomycosis   • Other fatigue   • Tinea pedis   • Menopausal hot flushes   • Screening for colon cancer       Advanced Care Planning:  ACP discussion was held with the patient during this visit. Patient does not have an advance directive, declines further assistance.    Review of Systems   Constitutional: Negative for activity change, appetite change, fatigue and fever.   HENT: Negative for congestion, ear pain, rhinorrhea, sinus pressure and sinus pain.    Eyes: Negative for discharge.   Respiratory: Negative for cough, choking and shortness of breath.    Cardiovascular: Negative for chest pain.   Gastrointestinal: Negative for nausea.   Neurological: Negative for dizziness and headaches.       Compared to one year ago, the patient feels her physical health is better.  Compared to one year ago, the patient feels her mental health is better.    Reviewed chart for potential of high risk medication in the elderly: not applicable  Reviewed chart for potential of harmful drug interactions in the elderly:not applicable    Objective         Vitals:    10/06/20 1345   BP: 130/78   Pulse: 88   Resp: 16   Temp: 97.7 °F (36.5 °C)   SpO2: 98%   Weight: 71.7 kg (158 lb)       Body mass index is 29.87 kg/m².  Discussed the patient's BMI with her. The BMI is in the acceptable range.    Physical Exam  Vitals signs and nursing note reviewed.    Constitutional:       General: She is not in acute distress.     Appearance: She is well-developed.   HENT:      Head: Normocephalic and atraumatic.      Right Ear: External ear normal.      Left Ear: External ear normal.   Neck:      Musculoskeletal: Neck supple.      Thyroid: No thyromegaly.   Cardiovascular:      Rate and Rhythm: Normal rate and regular rhythm.      Heart sounds: Normal heart sounds.   Pulmonary:      Effort: Pulmonary effort is normal.      Breath sounds: Normal breath sounds.   Musculoskeletal: Normal range of motion.   Skin:     General: Skin is warm.   Neurological:      Mental Status: She is alert.               Assessment/Plan   Medicare Risks and Personalized Health Plan  CMS Preventative Services Quick Reference  Advance Directive Discussion    The above risks/problems have been discussed with the patient.  Pertinent information has been shared with the patient in the After Visit Summary.  Follow up plans and orders are seen below in the Assessment/Plan Section.    Diagnoses and all orders for this visit:    1. Encounter for subsequent annual wellness visit (AWV) in Medicare patient (Primary)      Follow Up:  Return in about 1 year (around 10/6/2021) for Annual.     An After Visit Summary and PPPS were given to the patient.     Preventative counseling for diet and exercise with patient at visit.  Pt reports that they wear their seatbelt regularly.     Pt only advised to come to office once yearly

## 2020-10-07 NOTE — PATIENT INSTRUCTIONS
Preventive Care 65 Years and Older, Female  Preventive care refers to lifestyle choices and visits with your health care provider that can promote health and wellness. This includes:  · A yearly physical exam. This is also called an annual well check.  · Regular dental and eye exams.  · Immunizations.  · Screening for certain conditions.  · Healthy lifestyle choices, such as diet and exercise.  What can I expect for my preventive care visit?  Physical exam  Your health care provider will check:  · Height and weight. These may be used to calculate body mass index (BMI), which is a measurement that tells if you are at a healthy weight.  · Heart rate and blood pressure.  · Your skin for abnormal spots.  Counseling  Your health care provider may ask you questions about:  · Alcohol, tobacco, and drug use.  · Emotional well-being.  · Home and relationship well-being.  · Sexual activity.  · Eating habits.  · History of falls.  · Memory and ability to understand (cognition).  · Work and work environment.  · Pregnancy and menstrual history.  What immunizations do I need?    Influenza (flu) vaccine  · This is recommended every year.  Tetanus, diphtheria, and pertussis (Tdap) vaccine  · You may need a Td booster every 10 years.  Varicella (chickenpox) vaccine  · You may need this vaccine if you have not already been vaccinated.  Zoster (shingles) vaccine  · You may need this after age 60.  Pneumococcal conjugate (PCV13) vaccine  · One dose is recommended after age 65.  Pneumococcal polysaccharide (PPSV23) vaccine  · One dose is recommended after age 65.  Measles, mumps, and rubella (MMR) vaccine  · You may need at least one dose of MMR if you were born in 1957 or later. You may also need a second dose.  Meningococcal conjugate (MenACWY) vaccine  · You may need this if you have certain conditions.  Hepatitis A vaccine  · You may need this if you have certain conditions or if you travel or work in places where you may be exposed  to hepatitis A.  Hepatitis B vaccine  · You may need this if you have certain conditions or if you travel or work in places where you may be exposed to hepatitis B.  Haemophilus influenzae type b (Hib) vaccine  · You may need this if you have certain conditions.  You may receive vaccines as individual doses or as more than one vaccine together in one shot (combination vaccines). Talk with your health care provider about the risks and benefits of combination vaccines.  What tests do I need?  Blood tests  · Lipid and cholesterol levels. These may be checked every 5 years, or more frequently depending on your overall health.  · Hepatitis C test.  · Hepatitis B test.  Screening  · Lung cancer screening. You may have this screening every year starting at age 55 if you have a 30-pack-year history of smoking and currently smoke or have quit within the past 15 years.  · Colorectal cancer screening. All adults should have this screening starting at age 50 and continuing until age 75. Your health care provider may recommend screening at age 45 if you are at increased risk. You will have tests every 1-10 years, depending on your results and the type of screening test.  · Diabetes screening. This is done by checking your blood sugar (glucose) after you have not eaten for a while (fasting). You may have this done every 1-3 years.  · Mammogram. This may be done every 1-2 years. Talk with your health care provider about how often you should have regular mammograms.  · BRCA-related cancer screening. This may be done if you have a family history of breast, ovarian, tubal, or peritoneal cancers.  Other tests  · Sexually transmitted disease (STD) testing.  · Bone density scan. This is done to screen for osteoporosis. You may have this done starting at age 65.  Follow these instructions at home:  Eating and drinking  · Eat a diet that includes fresh fruits and vegetables, whole grains, lean protein, and low-fat dairy products. Limit  your intake of foods with high amounts of sugar, saturated fats, and salt.  · Take vitamin and mineral supplements as recommended by your health care provider.  · Do not drink alcohol if your health care provider tells you not to drink.  · If you drink alcohol:  ? Limit how much you have to 0-1 drink a day.  ? Be aware of how much alcohol is in your drink. In the [place] one drink equals one 12 oz bottle of beer (355 mL), one 5 oz glass of wine (148 mL), or one 1½ oz glass of hard liquor (44 mL).  Lifestyle  · Take daily care of your teeth and gums.  · Stay active. Exercise for at least 30 minutes on 5 or more days each week.  · Do not use any products that contain nicotine or tobacco, such as cigarettes, e-cigarettes, and chewing tobacco. If you need help quitting, ask your health care provider.  · If you are sexually active, practice safe sex. Use a condom or other form of protection in order to prevent STIs (sexually transmitted infections).  · Talk with your health care provider about taking a low-dose aspirin or statin.  What's next?  · Go to your health care provider once a year for a well check visit.  · Ask your health care provider how often you should have your eyes and teeth checked.  · Stay up to date on all vaccines.  This information is not intended to replace advice given to you by your health care provider. Make sure you discuss any questions you have with your health care provider.  Document Released: 01/13/2017 Document Revised: 12/12/2019 Document Reviewed: 12/12/2019  [employer] Patient Education © 2020 [employer]

## 2020-10-21 DIAGNOSIS — E78.2 MIXED HYPERLIPIDEMIA: ICD-10-CM

## 2020-10-21 RX ORDER — PRAVASTATIN SODIUM 40 MG
TABLET ORAL
Qty: 30 TABLET | Refills: 0 | Status: SHIPPED | OUTPATIENT
Start: 2020-10-21 | End: 2020-11-30

## 2020-11-26 DIAGNOSIS — E78.2 MIXED HYPERLIPIDEMIA: ICD-10-CM

## 2020-11-30 RX ORDER — PRAVASTATIN SODIUM 40 MG
TABLET ORAL
Qty: 30 TABLET | Refills: 0 | Status: SHIPPED | OUTPATIENT
Start: 2020-11-30 | End: 2020-12-30

## 2020-12-30 DIAGNOSIS — E78.2 MIXED HYPERLIPIDEMIA: ICD-10-CM

## 2020-12-30 RX ORDER — PRAVASTATIN SODIUM 40 MG
TABLET ORAL
Qty: 30 TABLET | Refills: 0 | Status: SHIPPED | OUTPATIENT
Start: 2020-12-30 | End: 2021-02-03

## 2021-01-05 DIAGNOSIS — Z12.31 ENCOUNTER FOR SCREENING MAMMOGRAM FOR MALIGNANT NEOPLASM OF BREAST: Primary | ICD-10-CM

## 2021-01-12 DIAGNOSIS — Z78.0 POSTMENOPAUSAL: ICD-10-CM

## 2021-01-12 DIAGNOSIS — Z13.820 OSTEOPOROSIS SCREENING: Primary | ICD-10-CM

## 2021-02-03 DIAGNOSIS — E78.2 MIXED HYPERLIPIDEMIA: Primary | ICD-10-CM

## 2021-02-03 DIAGNOSIS — R53.83 OTHER FATIGUE: ICD-10-CM

## 2021-02-03 DIAGNOSIS — E78.2 MIXED HYPERLIPIDEMIA: ICD-10-CM

## 2021-02-03 RX ORDER — PRAVASTATIN SODIUM 40 MG
TABLET ORAL
Qty: 30 TABLET | Refills: 2 | Status: SHIPPED | OUTPATIENT
Start: 2021-02-03 | End: 2021-05-12

## 2021-02-05 ENCOUNTER — APPOINTMENT (OUTPATIENT)
Dept: BONE DENSITY | Facility: HOSPITAL | Age: 71
End: 2021-02-05

## 2021-03-16 ENCOUNTER — BULK ORDERING (OUTPATIENT)
Dept: CASE MANAGEMENT | Facility: OTHER | Age: 71
End: 2021-03-16

## 2021-03-16 DIAGNOSIS — Z23 IMMUNIZATION DUE: ICD-10-CM

## 2021-03-26 ENCOUNTER — IMMUNIZATION (OUTPATIENT)
Dept: VACCINE CLINIC | Facility: HOSPITAL | Age: 71
End: 2021-03-26

## 2021-03-26 DIAGNOSIS — Z23 IMMUNIZATION DUE: ICD-10-CM

## 2021-03-26 PROCEDURE — 0001A: CPT | Performed by: INTERNAL MEDICINE

## 2021-03-26 PROCEDURE — 91300 HC SARSCOV02 VAC 30MCG/0.3ML IM: CPT | Performed by: INTERNAL MEDICINE

## 2021-04-16 ENCOUNTER — IMMUNIZATION (OUTPATIENT)
Dept: VACCINE CLINIC | Facility: HOSPITAL | Age: 71
End: 2021-04-16

## 2021-04-16 PROCEDURE — 0002A: CPT | Performed by: INTERNAL MEDICINE

## 2021-04-16 PROCEDURE — 91300 HC SARSCOV02 VAC 30MCG/0.3ML IM: CPT | Performed by: INTERNAL MEDICINE

## 2021-04-22 ENCOUNTER — HOSPITAL ENCOUNTER (OUTPATIENT)
Dept: MAMMOGRAPHY | Facility: HOSPITAL | Age: 71
Discharge: HOME | End: 2021-04-22
Admitting: NURSE PRACTITIONER

## 2021-04-22 DIAGNOSIS — Z12.31 ENCOUNTER FOR SCREENING MAMMOGRAM FOR MALIGNANT NEOPLASM OF BREAST: ICD-10-CM

## 2021-04-22 PROCEDURE — 77063 BREAST TOMOSYNTHESIS BI: CPT

## 2021-04-22 PROCEDURE — 77067 SCR MAMMO BI INCL CAD: CPT

## 2021-05-12 DIAGNOSIS — E78.2 MIXED HYPERLIPIDEMIA: ICD-10-CM

## 2021-05-12 RX ORDER — PRAVASTATIN SODIUM 40 MG
TABLET ORAL
Qty: 90 TABLET | Refills: 1 | Status: SHIPPED | OUTPATIENT
Start: 2021-05-12 | End: 2021-11-18

## 2021-08-26 ENCOUNTER — OFFICE VISIT (OUTPATIENT)
Dept: FAMILY MEDICINE CLINIC | Facility: CLINIC | Age: 71
End: 2021-08-26

## 2021-08-26 VITALS
HEIGHT: 61 IN | OXYGEN SATURATION: 96 % | SYSTOLIC BLOOD PRESSURE: 122 MMHG | RESPIRATION RATE: 16 BRPM | WEIGHT: 159 LBS | BODY MASS INDEX: 30.02 KG/M2 | DIASTOLIC BLOOD PRESSURE: 70 MMHG | HEART RATE: 91 BPM

## 2021-08-26 DIAGNOSIS — J01.01 ACUTE RECURRENT MAXILLARY SINUSITIS: Primary | ICD-10-CM

## 2021-08-26 PROCEDURE — 99213 OFFICE O/P EST LOW 20 MIN: CPT | Performed by: NURSE PRACTITIONER

## 2021-08-26 RX ORDER — AMOXICILLIN 500 MG/1
1000 CAPSULE ORAL 2 TIMES DAILY
Qty: 48 CAPSULE | Refills: 0 | Status: SHIPPED | OUTPATIENT
Start: 2021-08-26 | End: 2022-05-27

## 2021-08-26 NOTE — PATIENT INSTRUCTIONS
Sinusitis, Adult  Sinusitis is inflammation of your sinuses. Sinuses are hollow spaces in the bones around your face. Your sinuses are located:  · Around your eyes.  · In the middle of your forehead.  · Behind your nose.  · In your cheekbones.  Mucus normally drains out of your sinuses. When your nasal tissues become inflamed or swollen, mucus can become trapped or blocked. This allows bacteria, viruses, and fungi to grow, which leads to infection. Most infections of the sinuses are caused by a virus.  Sinusitis can develop quickly. It can last for up to 4 weeks (acute) or for more than 12 weeks (chronic). Sinusitis often develops after a cold.  What are the causes?  This condition is caused by anything that creates swelling in the sinuses or stops mucus from draining. This includes:  · Allergies.  · Asthma.  · Infection from bacteria or viruses.  · Deformities or blockages in your nose or sinuses.  · Abnormal growths in the nose (nasal polyps).  · Pollutants, such as chemicals or irritants in the air.  · Infection from fungi (rare).  What increases the risk?  You are more likely to develop this condition if you:  · Have a weak body defense system (immune system).  · Do a lot of swimming or diving.  · Overuse nasal sprays.  · Smoke.  What are the signs or symptoms?  The main symptoms of this condition are pain and a feeling of pressure around the affected sinuses. Other symptoms include:  · Stuffy nose or congestion.  · Thick drainage from your nose.  · Swelling and warmth over the affected sinuses.  · Headache.  · Upper toothache.  · A cough that may get worse at night.  · Extra mucus that collects in the throat or the back of the nose (postnasal drip).  · Decreased sense of smell and taste.  · Fatigue.  · A fever.  · Sore throat.  · Bad breath.  How is this diagnosed?  This condition is diagnosed based on:  · Your symptoms.  · Your medical history.  · A physical exam.  · Tests to find out if your condition is  acute or chronic. This may include:  ? Checking your nose for nasal polyps.  ? Viewing your sinuses using a device that has a light (endoscope).  ? Testing for allergies or bacteria.  ? Imaging tests, such as an MRI or CT scan.  In rare cases, a bone biopsy may be done to rule out more serious types of fungal sinus disease.  How is this treated?  Treatment for sinusitis depends on the cause and whether your condition is chronic or acute.  · If caused by a virus, your symptoms should go away on their own within 10 days. You may be given medicines to relieve symptoms. They include:  ? Medicines that shrink swollen nasal passages (topical intranasal decongestants).  ? Medicines that treat allergies (antihistamines).  ? A spray that eases inflammation of the nostrils (topical intranasal corticosteroids).  ? Rinses that help get rid of thick mucus in your nose (nasal saline washes).  · If caused by bacteria, your health care provider may recommend waiting to see if your symptoms improve. Most bacterial infections will get better without antibiotic medicine. You may be given antibiotics if you have:  ? A severe infection.  ? A weak immune system.  · If caused by narrow nasal passages or nasal polyps, you may need to have surgery.  Follow these instructions at home:  Medicines  · Take, use, or apply over-the-counter and prescription medicines only as told by your health care provider. These may include nasal sprays.  · If you were prescribed an antibiotic medicine, take it as told by your health care provider. Do not stop taking the antibiotic even if you start to feel better.  Hydrate and humidify    · Drink enough fluid to keep your urine pale yellow. Staying hydrated will help to thin your mucus.  · Use a cool mist humidifier to keep the humidity level in your home above 50%.  · Inhale steam for 10-15 minutes, 3-4 times a day, or as told by your health care provider. You can do this in the bathroom while a hot shower is  running.  · Limit your exposure to cool or dry air.  Rest  · Rest as much as possible.  · Sleep with your head raised (elevated).  · Make sure you get enough sleep each night.  General instructions    · Apply a warm, moist washcloth to your face 3-4 times a day or as told by your health care provider. This will help with discomfort.  · Wash your hands often with soap and water to reduce your exposure to germs. If soap and water are not available, use hand .  · Do not smoke. Avoid being around people who are smoking (secondhand smoke).  · Keep all follow-up visits as told by your health care provider. This is important.  Contact a health care provider if:  · You have a fever.  · Your symptoms get worse.  · Your symptoms do not improve within 10 days.  Get help right away if:  · You have a severe headache.  · You have persistent vomiting.  · You have severe pain or swelling around your face or eyes.  · You have vision problems.  · You develop confusion.  · Your neck is stiff.  · You have trouble breathing.  Summary  · Sinusitis is soreness and inflammation of your sinuses. Sinuses are hollow spaces in the bones around your face.  · This condition is caused by nasal tissues that become inflamed or swollen. The swelling traps or blocks the flow of mucus. This allows bacteria, viruses, and fungi to grow, which leads to infection.  · If you were prescribed an antibiotic medicine, take it as told by your health care provider. Do not stop taking the antibiotic even if you start to feel better.  · Keep all follow-up visits as told by your health care provider. This is important.  This information is not intended to replace advice given to you by your health care provider. Make sure you discuss any questions you have with your health care provider.  Document Revised: 05/20/2019 Document Reviewed: 05/20/2019  [employer] Patient Education © 2021 [employer]

## 2021-08-26 NOTE — PROGRESS NOTES
Subjective   Leny Neumann is a 71 y.o. female.   No chief complaint on file.    History of Present Illness   Kati is here w/ c/o headache.  She states she had daily headache, primarily on the Rt side of her head.  She states this is a long standing problem for her and she states she has seen allergist, CT of her head.  She is taking 9 500mg Tylenol qd.      The following portions of the patient's history were reviewed and updated as appropriate: allergies, current medications, past family history, past medical history, past social history, past surgical history and problem list.    Review of Systems   Constitutional: Negative for activity change and appetite change.   HENT: Positive for sinus pressure.    Respiratory: Negative for cough and shortness of breath.        Objective   Physical Exam  Vitals reviewed.   Constitutional:       Appearance: Normal appearance.   HENT:      Head: Atraumatic.   Neurological:      Mental Status: She is alert.           Assessment/Plan   Problem List Items Addressed This Visit     None               No follow-ups on file.

## 2021-09-22 DIAGNOSIS — G43.711 INTRACTABLE CHRONIC MIGRAINE WITHOUT AURA AND WITH STATUS MIGRAINOSUS: Primary | ICD-10-CM

## 2021-09-22 RX ORDER — RIZATRIPTAN BENZOATE 10 MG/1
10 TABLET ORAL ONCE AS NEEDED
Qty: 20 TABLET | Refills: 0 | Status: SHIPPED | OUTPATIENT
Start: 2021-09-22 | End: 2022-02-25

## 2021-11-18 DIAGNOSIS — E78.2 MIXED HYPERLIPIDEMIA: ICD-10-CM

## 2021-11-18 RX ORDER — PRAVASTATIN SODIUM 40 MG
TABLET ORAL
Qty: 30 TABLET | Refills: 0 | Status: SHIPPED | OUTPATIENT
Start: 2021-11-18 | End: 2021-12-14

## 2021-11-19 ENCOUNTER — IMMUNIZATION (OUTPATIENT)
Dept: FAMILY MEDICINE CLINIC | Facility: CLINIC | Age: 71
End: 2021-11-19

## 2021-11-19 DIAGNOSIS — Z23 NEED FOR VACCINATION: Primary | ICD-10-CM

## 2021-11-19 PROCEDURE — 91300 COVID-19 (PFIZER): CPT | Performed by: NURSE PRACTITIONER

## 2021-11-19 PROCEDURE — 0003A COVID-19 (PFIZER): CPT | Performed by: NURSE PRACTITIONER

## 2021-12-09 ENCOUNTER — OFFICE VISIT (OUTPATIENT)
Dept: NEUROLOGY | Facility: CLINIC | Age: 71
End: 2021-12-09

## 2021-12-09 ENCOUNTER — LAB (OUTPATIENT)
Dept: LAB | Facility: HOSPITAL | Age: 71
End: 2021-12-09

## 2021-12-09 VITALS — DIASTOLIC BLOOD PRESSURE: 78 MMHG | SYSTOLIC BLOOD PRESSURE: 124 MMHG | OXYGEN SATURATION: 87 % | HEART RATE: 98 BPM

## 2021-12-09 DIAGNOSIS — R51.9 NEW ONSET OF HEADACHES AFTER AGE 50: ICD-10-CM

## 2021-12-09 DIAGNOSIS — R20.0 NUMBNESS AND TINGLING OF BOTH FEET: ICD-10-CM

## 2021-12-09 DIAGNOSIS — R20.2 NUMBNESS AND TINGLING OF BOTH FEET: ICD-10-CM

## 2021-12-09 DIAGNOSIS — M54.81 OCCIPITAL NEURALGIA OF RIGHT SIDE: Primary | ICD-10-CM

## 2021-12-09 LAB
ALBUMIN SERPL-MCNC: 4.7 G/DL (ref 3.5–5.2)
ALBUMIN/GLOB SERPL: 1.6 G/DL
ALP SERPL-CCNC: 72 U/L (ref 39–117)
ALT SERPL W P-5'-P-CCNC: 25 U/L (ref 1–33)
ANION GAP SERPL CALCULATED.3IONS-SCNC: 9.2 MMOL/L (ref 5–15)
AST SERPL-CCNC: 18 U/L (ref 1–32)
BILIRUB SERPL-MCNC: 0.3 MG/DL (ref 0–1.2)
BUN SERPL-MCNC: 16 MG/DL (ref 8–23)
BUN/CREAT SERPL: 19.8 (ref 7–25)
CALCIUM SPEC-SCNC: 10.7 MG/DL (ref 8.6–10.5)
CHLORIDE SERPL-SCNC: 106 MMOL/L (ref 98–107)
CO2 SERPL-SCNC: 24.8 MMOL/L (ref 22–29)
CREAT SERPL-MCNC: 0.81 MG/DL (ref 0.57–1)
FOLATE SERPL-MCNC: 10.48 NG/ML (ref 4.78–24.2)
GFR SERPL CREATININE-BSD FRML MDRD: 70 ML/MIN/1.73
GLOBULIN UR ELPH-MCNC: 3 GM/DL
GLUCOSE SERPL-MCNC: 96 MG/DL (ref 65–99)
POTASSIUM SERPL-SCNC: 4.4 MMOL/L (ref 3.5–5.2)
PROT SERPL-MCNC: 7.7 G/DL (ref 6–8.5)
SODIUM SERPL-SCNC: 140 MMOL/L (ref 136–145)
TSH SERPL DL<=0.05 MIU/L-ACNC: 2.65 UIU/ML (ref 0.27–4.2)
VIT B12 BLD-MCNC: 552 PG/ML (ref 211–946)

## 2021-12-09 PROCEDURE — 99204 OFFICE O/P NEW MOD 45 MIN: CPT | Performed by: PSYCHIATRY & NEUROLOGY

## 2021-12-09 PROCEDURE — 80053 COMPREHEN METABOLIC PANEL: CPT | Performed by: PSYCHIATRY & NEUROLOGY

## 2021-12-09 PROCEDURE — 36415 COLL VENOUS BLD VENIPUNCTURE: CPT | Performed by: PSYCHIATRY & NEUROLOGY

## 2021-12-09 PROCEDURE — 82607 VITAMIN B-12: CPT

## 2021-12-09 PROCEDURE — 86592 SYPHILIS TEST NON-TREP QUAL: CPT

## 2021-12-09 PROCEDURE — 84443 ASSAY THYROID STIM HORMONE: CPT

## 2021-12-09 PROCEDURE — 82746 ASSAY OF FOLIC ACID SERUM: CPT

## 2021-12-09 NOTE — PROGRESS NOTES
Chief Complaint  Headache    Subjective          Leny Neumann presents to [facility] for   HISTORY OF PRESENT ILLNESS:    Kati Neumann is a 71 year old right handed woman who presents to neurology clinic for initial evaluation and treatment of headaches.  She reports her headaches starting in her neck and going to the back and side of her head only on the right side.  The pain is a sharp shooting pain which she rates as 7/10 on pain scale 1-10 when most severe.  She denies any light or sound sensitivity but maybe a little nausea but she is not sure if this is due to significant nausea.  She denies history of migraines.  No family history of migraines.  She tells me she has been to allergists and dentist.  The headaches has been going on for at least a year.  She has tried rizatriptan which did not help and made her feel weird.  She tells me she is sensitive to pharmaceutical medications.  She has tension in her neck and shoulders.  She had a CT scan of her neck done which did demonstrate degenerative disc changes.  She was treated with Tylenol most recently and NSAIDs in the past and I advised her to cut back on these medicines.      Past Medical History:   Diagnosis Date   • Allergic    • Colon polyp     followed by GI, Dr. Akbar   • Elevated serum creatinine    • Headache    • Hypercalcemia    • Hyperlipidemia    • Hyperparathyroidism (HCC)    • Hypothyroidism     transient   • Menopausal hot flushes    • Migraine    • Osteopenia    • PONV (postoperative nausea and vomiting)    • Tinea pedis         Family History   Problem Relation Age of Onset   • Breast cancer Mother 64   • Lung cancer Father 64   • Heart failure Father    • Heart attack Father    • Alcohol abuse Daughter    • Early death Maternal Grandfather 35        farm accident   • Ovarian cancer Neg Hx    • Uterine cancer Neg Hx    • Colon cancer Neg Hx    • Deep vein thrombosis Neg Hx    • Pulmonary embolism Neg Hx          Social History     Socioeconomic History   • Marital status:    Tobacco Use   • Smoking status: Former Smoker     Packs/day: 1.00     Years: 30.00     Pack years: 30.00     Quit date:      Years since quittin.9   • Smokeless tobacco: Never Used   • Tobacco comment: smoked 1 ppd for 20 yr, quit in , then relapsed for a year in    Substance and Sexual Activity   • Alcohol use: Not Currently     Comment: Rarely   • Drug use: No   • Sexual activity: Defer     Partners: Male     Birth control/protection: Post-menopausal        I have personally reviewed the ROS as stated below.     Review of Systems   Constitutional: Positive for fatigue.   Respiratory: Positive for shortness of breath.    Musculoskeletal: Positive for neck pain and neck stiffness.   Neurological: Positive for headache. Negative for dizziness, tremors, seizures, syncope, facial asymmetry, speech difficulty, weakness, light-headedness, numbness, memory problem and confusion.   Psychiatric/Behavioral: Negative for agitation, behavioral problems, decreased concentration, dysphoric mood, hallucinations, self-injury, sleep disturbance, suicidal ideas, negative for hyperactivity, depressed mood and stress. The patient is not nervous/anxious.         Objective   Vital Signs:   /78 (BP Location: Left arm, Patient Position: Sitting, Cuff Size: Adult)   Pulse 98   SpO2 (!) 87%       PHYSICAL EXAM:    General   Mental Status - Alert. General Appearance - Well developed, Well groomed, Oriented and Cooperative. Orientation - Oriented X3.       Head and Neck  Head - normocephalic, atraumatic with no lesions or palpable masses. Tenderness to palpation over the occipital nerve in the right side.    Neck    Global Assessment - supple.       Eye   Sclera/Conjunctiva - Bilateral - Normal.    ENMT  Mouth and Throat   Oral Cavity/Oropharynx: Oropharynx - the soft palate,uvula and tongue are normal in appearance.    Chest and Lung Exam    Chest - lung clear to auscultation bilaterally.    Cardiovascular   Cardiovascular examination reveals  - normal heart sounds, regular rate and rhythm.    Neurologic   Mental Status: Speech - Normal. Cognitive function - appropriate fund of knowledge. No impairment of attention, Impairment of concentration, impairment of long term memory or impairment of short term memory.  Cranial Nerves:   II Optic: Visual acuity - Left - Normal. Right - Normal. Visual fields - Normal (to confrontation).  III Oculomotor: Pupillary constriction - Left - Normal. Right - Normal.  VII Facial: - Normal Bilaterally.  VIII Acoustic - Bilateral - Hearing normal and (Hearing tested by finger rub).   IX Glossopharyngeal / X Vagus - Normal.  XI Accessory: Trapezius - Bilateral - Normal. Sternocleidomastoid - Bilateral - Normal.  XII Hypoglossal - Bilateral - Normal.  Eye Movements: - Normal Bilaterally.  Sensory:   Light Touch: Intact - Globally.  Temperature: Reduced in feet in a distally>proximal distribution.   Vibration: Intact - Globally.   Motor:   Bulk and Contour: - Normal.  Tone: - Normal.  Tremor: Not present.  Strength: 5/5 normal muscle strength - All Muscles.   General Assessment of Reflexes: - deep tendon reflexes are normal. Coordination - No Impairment of finger-to-nose or Impairment of rapid alternating movements. Gait - Normal.       Result Review :                 Assessment and Plan    Problem List Items Addressed This Visit        Musculoskeletal and Injuries    Occipital neuralgia of right side - Primary    Current Assessment & Plan     71 year old right handed woman who presents to neurology clinic for initial evaluation and treatment of headaches.  She reports her headaches starting in her neck and going to the back and side of her head only on the right side.  The pain is a sharp shooting pain which she rates as 7/10 on pain scale 1-10 when most severe.  She denies any light or sound sensitivity but maybe a little  nausea but she is not sure if this is due to significant nausea.  She denies history of migraines.  No family history of migraines.  She tells me she has been to allergists and dentist.  The headaches has been going on for at least a year.  She has tried rizatriptan which did not help and made her feel weird.  She tells me she is sensitive to pharmaceutical medications.  She has tension in her neck and shoulders.  She had a CT scan of her neck done which did demonstrate degenerative disc changes.  She was treated with Tylenol most recently and NSAIDs in the past and I advised her to cut back on these medicines.  I spoke with her about her symptoms being more consistent with occipital neuralgia and will set her up for occipital nerve block on the right side tomorrow.  Provided patient education information on occipital neuralgia.              Neuro    New onset of headaches after age 52    Current Assessment & Plan     I will order a brain MRI scan for further evaluation as well since she had new onset of headaches after the age of 52 which has continued.           Relevant Orders    RPR    MRI Brain Without Contrast       Symptoms and Signs    Numbness and tingling of both feet    Current Assessment & Plan     She does have numbness and reduced temperature sensation in her feet.  I will order lab work including CMP, TSH, Vit B12, folate and syphilis testing with RPR.           Relevant Orders    Comprehensive Metabolic Panel    TSH    Vitamin B12    Folate    RPR          I spent 45 minutes caring for Leny on this date of service. This time includes time spent by me in the following activities:preparing for the visit, reviewing tests, obtaining and/or reviewing a separately obtained history, performing a medically appropriate examination and/or evaluation , counseling and educating the patient/family/caregiver, ordering medications, tests, or procedures, documenting information in the medical record and care  coordination    Follow Up   No follow-ups on file.  Patient was given instructions and counseling regarding her condition or for health maintenance advice. Please see specific information pulled into the AVS if appropriate.

## 2021-12-09 NOTE — ASSESSMENT & PLAN NOTE
71 year old right handed woman who presents to neurology clinic for initial evaluation and treatment of headaches.  She reports her headaches starting in her neck and going to the back and side of her head only on the right side.  The pain is a sharp shooting pain which she rates as 7/10 on pain scale 1-10 when most severe.  She denies any light or sound sensitivity but maybe a little nausea but she is not sure if this is due to significant nausea.  She denies history of migraines.  No family history of migraines.  She tells me she has been to allergists and dentist.  The headaches has been going on for at least a year.  She has tried rizatriptan which did not help and made her feel weird.  She tells me she is sensitive to pharmaceutical medications.  She has tension in her neck and shoulders.  She had a CT scan of her neck done which did demonstrate degenerative disc changes.  She was treated with Tylenol most recently and NSAIDs in the past and I advised her to cut back on these medicines.  I spoke with her about her symptoms being more consistent with occipital neuralgia and will set her up for occipital nerve block on the right side tomorrow.  Provided patient education information on occipital neuralgia.

## 2021-12-09 NOTE — ASSESSMENT & PLAN NOTE
I will order a brain MRI scan for further evaluation as well since she had new onset of headaches after the age of 52 which has continued.

## 2021-12-09 NOTE — PATIENT INSTRUCTIONS
Occipital Neuralgia    Occipital neuralgia is a type of headache that causes brief episodes of very bad pain in the back of your head. Pain from occipital neuralgia may spread (radiate) to other parts of your head.  These headaches may be caused by irritation of the nerves that leave your spinal cord high up in your neck, just below the base of your skull (occipital nerves). Your occipital nerves transmit sensations from the back of your head, the top of your head, and the areas behind your ears.  What are the causes?  This condition can occur without any known cause (primary headache syndrome). In other cases, this condition is caused by pressure on or irritation of one of the two occipital nerves. Pressure and irritation may be due to:  · Muscle spasm in the neck.  · Neck injury.  · Wear and tear of the vertebrae in the neck (osteoarthritis).  · Disease of the disks that separate the vertebrae.  · Swollen blood vessels that put pressure on the occipital nerves.  · Infections.  · Tumors.  · Diabetes.  What are the signs or symptoms?  This condition causes brief burning, stabbing, electric, shocking, or shooting pain which can radiate to the top of the head. It can happen on one side or both sides of the head. It can also cause:  · Pain behind the eye.  · Pain triggered by neck movement or hair brushing.  · Scalp tenderness.  · Aching in the back of the head between episodes of very bad pain.  · Pain gets worse with exposure to bright lights.  How is this diagnosed?  There is no test that diagnoses this condition. Your health care provider may diagnose this condition based on a physical exam and your symptoms. Other tests may be done, such as:  · Imaging studies of the brain and neck (cervical spine), such as an MRI or CT scan. These look for causes of pinched nerves.  · Applying pressure to the nerves in the neck to try to re-create the pain.  · Injection of numbing medicine into the occipital nerve areas to see if  pain goes away (diagnostic nerve block).  How is this treated?  Treatment for this condition may begin with simple measures, such as:  · Rest.  · Massage.  · Applying heat or cold on the area.  · Over-the-counter pain relievers.  If these measures do not work, you may need other treatments, including:  · Medicines, such as:  ? Prescription-strength anti-inflammatory medicines.  ? Muscle relaxants.  ? Anti-seizure medicines, which can relieve pain.  ? Antidepressants, which can relieve pain.  ? Injected medicines, such as medicines that numb the area (local anesthetic) and steroids.  · Pulsed radiofrequency ablation. This is when wires are implanted to deliver electrical impulses that block pain signals from the occipital nerve.  · Surgery to relieve nerve pressure.  · Physical therapy.  Follow these instructions at home:  Pain management         · Avoid any activities that cause pain.  · Rest when you have an attack of pain.  · Try gentle massage to relieve pain.  · Try a different pillow or sleeping position.  · If directed, apply heat to the affected area as told by your health care provider. Use the heat source that your health care provider recommends, such as a moist heat pack or a heating pad.  ? Place a towel between your skin and the heat source.  ? Leave the heat on for 20-30 minutes.  ? Remove the heat if your skin turns bright red. This is especially important if you are unable to feel pain, heat, or cold. You may have a greater risk of getting burned.  · If directed, apply ice to the back of the head and neck area as told by your health care provider.  ? Put ice in a plastic bag.  ? Place a towel between your skin and the bag.  ? Leave the ice on for 20 minutes, 2-3 times per day.  General instructions  · Take over-the-counter and prescription medicines only as told by your health care provider.  · Avoid things that make your symptoms worse, such as bright lights.  · Try to stay active. Get regular  exercise that does not cause pain. Ask your health care provider to suggest safe exercises for you.  · Work with a physical therapist to learn stretching exercises you can do at home.  · Practice good posture.  · Keep all follow-up visits as told by your health care provider. This is important.  Contact a health care provider if:  · Your medicine is not working.  · You have new or worsening symptoms.  Get help right away if:  · You have very bad head pain that does not go away.  · You have a sudden change in vision, balance, or speech.  Summary  · Occipital neuralgia is a type of headache that causes brief episodes of very bad pain in the back of your head.  · Pain from occipital neuralgia may spread (radiate) to other parts of your head.  · Treatment for this condition includes rest, massage, and medicines.  This information is not intended to replace advice given to you by your health care provider. Make sure you discuss any questions you have with your health care provider.  Document Revised: 12/04/2018 Document Reviewed: 02/22/2018  Elsevier Patient Education © 2021 [employer]

## 2021-12-09 NOTE — ASSESSMENT & PLAN NOTE
She does have numbness and reduced temperature sensation in her feet.  I will order lab work including CMP, TSH, Vit B12, folate and syphilis testing with RPR.

## 2021-12-10 ENCOUNTER — PROCEDURE VISIT (OUTPATIENT)
Dept: NEUROLOGY | Facility: CLINIC | Age: 71
End: 2021-12-10

## 2021-12-10 DIAGNOSIS — M54.81 OCCIPITAL NEURALGIA OF RIGHT SIDE: Primary | ICD-10-CM

## 2021-12-10 LAB — RPR SER QL: NORMAL

## 2021-12-10 PROCEDURE — 64405 NJX AA&/STRD GR OCPL NRV: CPT | Performed by: PSYCHIATRY & NEUROLOGY

## 2021-12-10 RX ORDER — METHYLPREDNISOLONE ACETATE 40 MG/ML
40 INJECTION, SUSPENSION INTRA-ARTICULAR; INTRALESIONAL; INTRAMUSCULAR; SOFT TISSUE ONCE
Status: COMPLETED | OUTPATIENT
Start: 2021-12-10 | End: 2021-12-10

## 2021-12-10 RX ORDER — LIDOCAINE HYDROCHLORIDE 20 MG/ML
5 INJECTION, SOLUTION EPIDURAL; INFILTRATION; INTRACAUDAL; PERINEURAL ONCE
Status: COMPLETED | OUTPATIENT
Start: 2021-12-10 | End: 2021-12-10

## 2021-12-10 RX ADMIN — METHYLPREDNISOLONE ACETATE 40 MG: 40 INJECTION, SUSPENSION INTRA-ARTICULAR; INTRALESIONAL; INTRAMUSCULAR; SOFT TISSUE at 09:03

## 2021-12-10 RX ADMIN — LIDOCAINE HYDROCHLORIDE 5 ML: 20 INJECTION, SOLUTION EPIDURAL; INFILTRATION; INTRACAUDAL; PERINEURAL at 09:02

## 2021-12-10 NOTE — PROGRESS NOTES
Occipital Nerve Block Procedure Note:    PROCEDURE IN DETAIL:  The patient was injected in the right occipital nerves with 2% lidocaine, a total of 2.5 mL times 1, and Depomedrol total of 0.5 mL or 20 mg times 1  after obtaining written consent. Sterile technique was used including sterile gloves and needles. Injection sites identified using known anatomical landmarks.  The area to be injected was prepped with sterilizing agent. The patient tolerated the procedure without any complications. She will be returning for injection as indicated and clinic for follow up as previously scheduled.

## 2021-12-14 DIAGNOSIS — E78.2 MIXED HYPERLIPIDEMIA: ICD-10-CM

## 2021-12-14 RX ORDER — PRAVASTATIN SODIUM 40 MG
TABLET ORAL
Qty: 30 TABLET | Refills: 0 | Status: SHIPPED | OUTPATIENT
Start: 2021-12-14 | End: 2022-01-18

## 2021-12-15 ENCOUNTER — PATIENT ROUNDING (BHMG ONLY) (OUTPATIENT)
Dept: NEUROLOGY | Facility: CLINIC | Age: 71
End: 2021-12-15

## 2021-12-21 DIAGNOSIS — E78.2 MIXED HYPERLIPIDEMIA: Primary | ICD-10-CM

## 2021-12-21 DIAGNOSIS — Z79.899 HIGH RISK MEDICATION USE: ICD-10-CM

## 2021-12-24 LAB
ALBUMIN SERPL-MCNC: 4.7 G/DL (ref 3.8–4.8)
ALBUMIN/GLOB SERPL: 2.1 {RATIO} (ref 1.2–2.2)
ALP SERPL-CCNC: 69 IU/L (ref 44–121)
ALT SERPL-CCNC: 18 IU/L (ref 0–32)
AST SERPL-CCNC: 15 IU/L (ref 0–40)
BILIRUB SERPL-MCNC: 0.3 MG/DL (ref 0–1.2)
BUN SERPL-MCNC: 21 MG/DL (ref 8–27)
BUN/CREAT SERPL: 24 (ref 12–28)
CALCIUM SERPL-MCNC: 10.7 MG/DL (ref 8.7–10.3)
CHLORIDE SERPL-SCNC: 102 MMOL/L (ref 96–106)
CHOLEST SERPL-MCNC: 251 MG/DL (ref 100–199)
CO2 SERPL-SCNC: 25 MMOL/L (ref 20–29)
CREAT SERPL-MCNC: 0.87 MG/DL (ref 0.57–1)
ERYTHROCYTE [DISTWIDTH] IN BLOOD BY AUTOMATED COUNT: 13.3 % (ref 11.7–15.4)
GLOBULIN SER CALC-MCNC: 2.2 G/DL (ref 1.5–4.5)
GLUCOSE SERPL-MCNC: 91 MG/DL (ref 65–99)
HCT VFR BLD AUTO: 42.7 % (ref 34–46.6)
HDLC SERPL-MCNC: 54 MG/DL
HGB BLD-MCNC: 14.1 G/DL (ref 11.1–15.9)
LDLC SERPL CALC-MCNC: 173 MG/DL (ref 0–99)
LDLC/HDLC SERPL: 3.2 RATIO (ref 0–3.2)
MCH RBC QN AUTO: 30.7 PG (ref 26.6–33)
MCHC RBC AUTO-ENTMCNC: 33 G/DL (ref 31.5–35.7)
MCV RBC AUTO: 93 FL (ref 79–97)
PLATELET # BLD AUTO: 272 X10E3/UL (ref 150–450)
POTASSIUM SERPL-SCNC: 5.2 MMOL/L (ref 3.5–5.2)
PROT SERPL-MCNC: 6.9 G/DL (ref 6–8.5)
RBC # BLD AUTO: 4.6 X10E6/UL (ref 3.77–5.28)
SODIUM SERPL-SCNC: 140 MMOL/L (ref 134–144)
TRIGL SERPL-MCNC: 134 MG/DL (ref 0–149)
VLDLC SERPL CALC-MCNC: 24 MG/DL (ref 5–40)
WBC # BLD AUTO: 8.7 X10E3/UL (ref 3.4–10.8)

## 2021-12-30 ENCOUNTER — OFFICE VISIT (OUTPATIENT)
Dept: FAMILY MEDICINE CLINIC | Facility: CLINIC | Age: 71
End: 2021-12-30

## 2021-12-30 VITALS
HEART RATE: 61 BPM | RESPIRATION RATE: 16 BRPM | BODY MASS INDEX: 30.23 KG/M2 | SYSTOLIC BLOOD PRESSURE: 144 MMHG | WEIGHT: 160 LBS | OXYGEN SATURATION: 96 % | DIASTOLIC BLOOD PRESSURE: 80 MMHG

## 2021-12-30 DIAGNOSIS — Z78.0 POSTMENOPAUSAL: ICD-10-CM

## 2021-12-30 DIAGNOSIS — R79.89 ELEVATED PTHRP LEVEL: ICD-10-CM

## 2021-12-30 DIAGNOSIS — Z00.00 MEDICARE ANNUAL WELLNESS VISIT, SUBSEQUENT: ICD-10-CM

## 2021-12-30 DIAGNOSIS — M54.2 NECK PAIN: Primary | ICD-10-CM

## 2021-12-30 DIAGNOSIS — R53.83 FATIGUE, UNSPECIFIED TYPE: ICD-10-CM

## 2021-12-30 PROCEDURE — 1170F FXNL STATUS ASSESSED: CPT | Performed by: NURSE PRACTITIONER

## 2021-12-30 PROCEDURE — G0439 PPPS, SUBSEQ VISIT: HCPCS | Performed by: NURSE PRACTITIONER

## 2021-12-30 PROCEDURE — 1159F MED LIST DOCD IN RCRD: CPT | Performed by: NURSE PRACTITIONER

## 2021-12-30 NOTE — PROGRESS NOTES
Medicare Subsequent Wellness Visit  Subjective   History of Present Illness    Leny Neumann is a 71 y.o. female who presents for an Medicare Wellness Visit. In addition, we addressed the following health issues:  She has had years of pain to her right cervical node area. She has been seen by ENT several times with no diagnosis. I referred her to pain management for some neck pain she had had for years. The neurologist (Dr Abdul) advised her to follow up with a neurosurgeon.    PMH, PSH, SocHx, FamHx, Allergies, and Medications: Reviewed and updated in the history section of chart.  Family History   Problem Relation Age of Onset   • Breast cancer Mother 64   • Lung cancer Father 64   • Heart failure Father    • Heart attack Father    • Alcohol abuse Daughter    • Early death Maternal Grandfather 35        farm accident   • Ovarian cancer Neg Hx    • Uterine cancer Neg Hx    • Colon cancer Neg Hx    • Deep vein thrombosis Neg Hx    • Pulmonary embolism Neg Hx        Social History     Social History Narrative    Lives in a place of her own with her 2 dogs.  Works as an .         No Known Allergies    Outpatient Medications Prior to Visit   Medication Sig Dispense Refill   • amoxicillin (AMOXIL) 500 MG capsule Take 2 capsules by mouth 2 (Two) Times a Day. 48 capsule 0   • [employer] FIBER GUMMIES PO Take 2 tablets by mouth.     • levocetirizine (XYZAL) 5 MG tablet Take 5 mg by mouth Daily.  11   • Multiple Vitamins-Minerals (MULTIVITAMIN WITH MINERALS) tablet tablet Take 1 tablet by mouth Daily.     • omeprazole (priLOSEC) 40 MG capsule Take 40 mg by mouth As Needed.  3   • PARoxetine (PAXIL) 10 MG tablet Take 1 tablet by mouth Every Morning. 30 tablet 11   • pravastatin (PRAVACHOL) 40 MG tablet TAKE 1 TABLET BY MOUTH EVERY DAY 30 tablet 0   • predniSONE (DELTASONE) 20 MG tablet 3 tabs daily for 2 days, 2 tabs daily for 3 days. 1 tablet daily for 5 days,then 1/2 tablet daily for 2 days 18 tablet 0   •  rizatriptan (Maxalt) 10 MG tablet Take 1 tablet by mouth 1 (One) Time As Needed for Migraine for up to 1 dose. May repeat in 2 hours if needed 20 tablet 0     No facility-administered medications prior to visit.        Patient Active Problem List   Diagnosis   • Hyperlipidemia   • Osteopenia   • Colon polyp   • Allergic   • Hypothyroidism   • Tinnitus of both ears   • Onychomycosis   • Other fatigue   • Tinea pedis   • Menopausal hot flushes   • Screening for colon cancer   • Occipital neuralgia of right side   • Numbness and tingling of both feet   • New onset of headaches after age 52         Patient Care Team:  Zora Casillas APRN as PCP - General (Family Medicine)  Morgan Mark MD as Consulting Physician (Otolaryngology)  Health Habits:  Current Diet: Well balanced diet  Tobacco use.  Pack years:  Dental Exam.UTD   Eye Exam.UTD  Exercise:no regular  Current exercise activities include:see above    Recent Hospitalizations:  none    Age-appropriate Screening Schedule:  Refer to the list below for future screening recommendations based on patient's age. Orders for these recommended tests are listed in the plan section. The patient has been provided with a written plan.      Health Maintenance   Topic Date Due   • LUNG CANCER SCREENING  Never done   • DXA SCAN  03/28/2020   • PAP SMEAR  03/21/2021   • INFLUENZA VACCINE  08/01/2021   • ANNUAL WELLNESS VISIT  10/06/2021   • MAMMOGRAM  04/22/2022   • TDAP/TD VACCINES (3 - Td or Tdap) 08/19/2022   • LIPID PANEL  12/23/2022   • COLORECTAL CANCER SCREENING  08/16/2023   • HEPATITIS C SCREENING  Completed   • COVID-19 Vaccine  Completed   • Pneumococcal Vaccine 65+  Completed   • ZOSTER VACCINE  Completed       Depression Screen:   PHQ-2/PHQ-9 Depression Screening 12/30/2021   Little interest or pleasure in doing things 0   Feeling down, depressed, or hopeless 0   Total Score 0       Functional and Cognitive Screening:  Functional & Cognitive Status 12/30/2021    Do you have difficulty preparing food and eating? No   Do you have difficulty bathing yourself, getting dressed or grooming yourself? No   Do you have difficulty using the toilet? No   Do you have difficulty moving around from place to place? No   Do you have trouble with steps or getting out of a bed or a chair? No   Current Diet Frequent Junk Food   Dental Exam Up to date   Eye Exam Not up to date   Exercise (times per week) 2 times per week   Current Exercises Include Stair Step Machine   Current Exercise Activities Include -   Do you need help using the phone?  No   Are you deaf or do you have serious difficulty hearing?  No   Do you need help with transportation? No   Do you need help shopping? No   Do you need help preparing meals?  No   Do you need help with housework?  No   Do you need help with laundry? No   Do you need help taking your medications? No   Do you need help managing money? No   Do you ever drive or ride in a car without wearing a seat belt? No   Have you felt unusual stress, anger or loneliness in the last month? No   Who do you live with? Alone   If you need help, do you have trouble finding someone available to you? No   Have you been bothered in the last four weeks by sexual problems? No   Do you have difficulty concentrating, remembering or making decisions? No     Does the patient have evidence of cognitive impairment? no    Compared to one year ago, the patient feels their physical health and mental health are the same. Both the same      Review of Systems    Objective     Vitals:    12/30/21 1002   BP: 144/80   Pulse: 61   Resp: 16   SpO2: 96%   Weight: 72.6 kg (160 lb)       Body mass index is 30.23 kg/m².    PHYSICAL EXAM  Vitals reviewed and on chart.  HEENT: PERRLA, EOMI. Oral mucosa moist,   No LAD.  CV: RRR, no murmurs, rubs, clicks or gallops  LUNGS: CTA bilaterally  EXT: No edema, FROM in bilateral upper and lower ext  NEURO: CN II - XII grossly intact  PSYCH: good mood,  positive affect, alert and engaged. No thoughts of self harm  expressed.    ASSESSMENT AND PLAN  Mammogram:UTD  Colon cancer screening :UTD  Lung cancer screening :Not needed  Bone Density :Ordered one today      Problem List Items Addressed This Visit     None      Visit Diagnoses     Neck pain    -  Primary    Relevant Orders    Ambulatory Referral to Neurosurgery    Postmenopausal        Relevant Orders    DEXA Bone Density Axial    Elevated PTHrP level        Relevant Orders    TSH    PTH, Intact    Fatigue, unspecified type        Relevant Orders    TSH    PTH, Intact    Medicare annual wellness visit, subsequent              Orders:  Orders Placed This Encounter   Procedures   • DEXA Bone Density Axial   • TSH   • PTH, Intact   • Ambulatory Referral to Neurosurgery       Follow Up:  Return in about 6 months (around 6/30/2022).     ADVANCED DIRECTIVES:   ACP discussion was held with the patient during this visit. Patient has an advance directive in EMR which is still valid.     An After Visit Summary and PPPS with all of these plans were given to the patient.    Labwork will be posted on [facility]

## 2021-12-31 LAB
PTH-INTACT SERPL-MCNC: 84 PG/ML (ref 15–65)
TSH SERPL DL<=0.005 MIU/L-ACNC: 3.06 UIU/ML (ref 0.45–4.5)

## 2022-01-18 DIAGNOSIS — E78.2 MIXED HYPERLIPIDEMIA: ICD-10-CM

## 2022-01-18 RX ORDER — PRAVASTATIN SODIUM 40 MG
TABLET ORAL
Qty: 90 TABLET | Refills: 1 | Status: SHIPPED | OUTPATIENT
Start: 2022-01-18 | End: 2023-02-16 | Stop reason: SDUPTHER

## 2022-01-20 ENCOUNTER — HOSPITAL ENCOUNTER (OUTPATIENT)
Dept: MRI IMAGING | Facility: HOSPITAL | Age: 72
Discharge: HOME | End: 2022-01-20
Admitting: PSYCHIATRY & NEUROLOGY

## 2022-01-20 DIAGNOSIS — R51.9 NEW ONSET OF HEADACHES AFTER AGE 50: ICD-10-CM

## 2022-01-20 PROCEDURE — 70551 MRI BRAIN STEM W/O DYE: CPT

## 2022-02-25 ENCOUNTER — OFFICE VISIT (OUTPATIENT)
Dept: NEUROLOGY | Facility: CLINIC | Age: 72
End: 2022-02-25

## 2022-02-25 VITALS
WEIGHT: 165 LBS | OXYGEN SATURATION: 96 % | BODY MASS INDEX: 31.15 KG/M2 | HEIGHT: 61 IN | HEART RATE: 83 BPM | SYSTOLIC BLOOD PRESSURE: 138 MMHG | DIASTOLIC BLOOD PRESSURE: 84 MMHG

## 2022-02-25 DIAGNOSIS — R20.0 NUMBNESS AND TINGLING OF BOTH FEET: ICD-10-CM

## 2022-02-25 DIAGNOSIS — R20.2 NUMBNESS AND TINGLING OF BOTH FEET: ICD-10-CM

## 2022-02-25 DIAGNOSIS — M54.81 OCCIPITAL NEURALGIA OF RIGHT SIDE: Primary | ICD-10-CM

## 2022-02-25 PROCEDURE — 99213 OFFICE O/P EST LOW 20 MIN: CPT | Performed by: PSYCHIATRY & NEUROLOGY

## 2022-02-25 NOTE — PROGRESS NOTES
Chief Complaint  Headache    Subjective          Leny Neumann presents to [facility] NEUROLOG[facility] for   HISTORY OF PRESENT ILLNESS:    71 year old right handed woman who returns to neurology clinic for follow up evaluation and treatment of occipital headaches.  She reports her headaches starting in her neck and going to the back and side of her head only on the right side.  The pain is a sharp shooting pain which she rates as 7/10 on pain scale 1-10 when most severe.  She denies any light or sound sensitivity but maybe a little nausea but she is not sure if this is due to significant nausea.  She denies history of migraines.  No family history of migraines.  She has tried rizatriptan which did not help and made her feel weird.  She tells me she is sensitive to pharmaceutical medications.  She has tension in her neck and shoulders.  She had a CT scan of her neck done which did demonstrate degenerative disc changes.  She was treated with Tylenol most recently and NSAIDs in the past and following her initial visit I performed occipital nerve blocks for her on 12/10/2021 and she returns today and reports significant help with her headaches with practically no headaches until about 1-2 weeks ago so over 2 months of headache relief with these injections and more recently her headaches are coming back but responding to Tylenol.  She had a brain MRI scan done which did not demonstrate any acute intracranial abnormalities.      Past Medical History:   Diagnosis Date   • Allergic    • Colon polyp     followed by GI, Dr. Akbar   • Elevated serum creatinine    • Headache    • Hypercalcemia    • Hyperlipidemia    • Hyperparathyroidism (HCC)    • Hypothyroidism     transient   • Menopausal hot flushes    • Migraine    • Osteopenia    • PONV (postoperative nausea and vomiting)    • Tinea pedis         Family History   Problem Relation Age of Onset   • Breast cancer Mother 64   • Lung cancer Father 64   • Heart  "failure Father    • Heart attack Father    • Alcohol abuse Daughter    • Early death Maternal Grandfather 35        farm accident   • Ovarian cancer Neg Hx    • Uterine cancer Neg Hx    • Colon cancer Neg Hx    • Deep vein thrombosis Neg Hx    • Pulmonary embolism Neg Hx         Social History     Socioeconomic History   • Marital status:    Tobacco Use   • Smoking status: Former Smoker     Packs/day: 1.00     Years: 30.00     Pack years: 30.00     Quit date: 2012     Years since quitting: 10.1   • Smokeless tobacco: Never Used   • Tobacco comment: smoked 1 ppd for 20 yr, quit in 2000, then relapsed for a year in 2011   Substance and Sexual Activity   • Alcohol use: Not Currently     Comment: Rarely   • Drug use: No   • Sexual activity: Defer     Partners: Male     Birth control/protection: Post-menopausal        I have reviewed and confirmed the accuracy of the ROS as documented by the MA/LPN/RN Remington Luna MD    Review of Systems   Musculoskeletal: Positive for gait problem.   Neurological: Positive for dizziness. Negative for tremors, seizures, syncope, facial asymmetry, speech difficulty, weakness, light-headedness, numbness, headache, memory problem and confusion.   Psychiatric/Behavioral: Negative for agitation, behavioral problems, decreased concentration, dysphoric mood, hallucinations, self-injury, sleep disturbance, suicidal ideas, negative for hyperactivity, depressed mood and stress. The patient is not nervous/anxious.         Objective   Vital Signs:   /84 (BP Location: Left arm, Patient Position: Sitting, Cuff Size: Adult)   Pulse 83   Ht 154.9 cm (61\")   Wt 74.8 kg (165 lb)   SpO2 96%   BMI 31.18 kg/m²       PHYSICAL EXAM:    General   Mental Status - Alert. General Appearance - Well developed, Well groomed, Oriented and Cooperative. Orientation - Oriented X3.       Head and Neck  Head - normocephalic, atraumatic with no lesions or palpable masses. Tenderness to palpation over " the occipital nerve in the right side.  Neck    Global Assessment - supple.       Eye   Sclera/Conjunctiva - Bilateral - Normal.    ENMT  Mouth and Throat   Oral Cavity/Oropharynx: Oropharynx - the soft palate,uvula and tongue are normal in appearance.    Chest and Lung Exam   Chest - lung clear to auscultation bilaterally.    Cardiovascular   Cardiovascular examination reveals  - normal heart sounds, regular rate and rhythm.    Neurologic   Mental Status: Speech - Normal. Cognitive function - appropriate fund of knowledge. No impairment of attention, Impairment of concentration, impairment of long term memory or impairment of short term memory.  Cranial Nerves:   II Optic: Visual acuity - Left - Normal. Right - Normal. Visual fields - Normal (to confrontation).  III Oculomotor: Pupillary constriction - Left - Normal. Right - Normal.  VII Facial: - Normal Bilaterally.   IX Glossopharyngeal / X Vagus - Normal.  XI Accessory: Trapezius - Bilateral - Normal. Sternocleidomastoid - Bilateral - Normal.  XII Hypoglossal - Bilateral - Normal.  Eye Movements: - Normal Bilaterally.  Sensory:   Light Touch: Intact - Globally.  Temperature: Reduced in feet in a distally>proximal distribution.  Motor:   Bulk and Contour: - Normal.  Tone: - Normal.  Tremor: Not present.  Strength: 5/5 normal muscle strength - All Muscles.   General Assessment of Reflexes: - deep tendon reflexes are normal. Coordination - No Impairment of finger-to-nose or Impairment of rapid alternating movements. Gait - Normal.       Result Review :                 Assessment and Plan    Problem List Items Addressed This Visit        Musculoskeletal and Injuries    Occipital neuralgia of right side - Primary    Current Assessment & Plan     71 year old right handed woman with right sided occipital headaches.  She reports her headaches starting in her neck and going to the back and side of her head only on the right side.  The pain is a sharp shooting pain which  she rates as 7/10 on pain scale 1-10 when most severe.  She denies any light or sound sensitivity but maybe a little nausea but she is not sure if this is due to significant nausea.  She denies history of migraines.  No family history of migraines.  She has tried rizatriptan which did not help and made her feel weird.  She tells me she is sensitive to pharmaceutical medications.  She has tension in her neck and shoulders.  She had a CT scan of her neck done which did demonstrate degenerative disc changes.  She was treated with Tylenol most recently and NSAIDs in the past and following her initial visit I performed occipital nerve blocks for her on 12/10/2021 and she returns today and reports significant help with her headaches with practically no headaches until about 1-2 weeks ago so over 2 months of headache relief with these injections and more recently her headaches are coming back but responding to Tylenol.  She had a brain MRI scan done which did not demonstrate any acute intracranial abnormalities.  I will set her up for right sided ONB for further assistance.              Symptoms and Signs    Numbness and tingling of both feet    Current Assessment & Plan     Numbness and reduced temperature sensation in her feet, normal lab work since last visit including CMP, TSH, Vit B12, Folate and RPR.  This maybe familial neuropathy.  She can use some lidocaine ointment topically for further assistance if needed.                 I spent 20 minutes caring for Leny on this date of service. This time includes time spent by me in the following activities:preparing for the visit, reviewing tests, obtaining and/or reviewing a separately obtained history, performing a medically appropriate examination and/or evaluation , counseling and educating the patient/family/caregiver, documenting information in the medical record and care coordination    Follow Up   Return in about 3 months (around 5/25/2022).  Patient was given  instructions and counseling regarding her condition or for health maintenance advice. Please see specific information pulled into the AVS if appropriate.

## 2022-02-25 NOTE — ASSESSMENT & PLAN NOTE
71 year old right handed woman with right sided occipital headaches.  She reports her headaches starting in her neck and going to the back and side of her head only on the right side.  The pain is a sharp shooting pain which she rates as 7/10 on pain scale 1-10 when most severe.  She denies any light or sound sensitivity but maybe a little nausea but she is not sure if this is due to significant nausea.  She denies history of migraines.  No family history of migraines.  She has tried rizatriptan which did not help and made her feel weird.  She tells me she is sensitive to pharmaceutical medications.  She has tension in her neck and shoulders.  She had a CT scan of her neck done which did demonstrate degenerative disc changes.  She was treated with Tylenol most recently and NSAIDs in the past and following her initial visit I performed occipital nerve blocks for her on 12/10/2021 and she returns today and reports significant help with her headaches with practically no headaches until about 1-2 weeks ago so over 2 months of headache relief with these injections and more recently her headaches are coming back but responding to Tylenol.  She had a brain MRI scan done which did not demonstrate any acute intracranial abnormalities.  I will set her up for right sided ONB for further assistance.

## 2022-02-25 NOTE — ASSESSMENT & PLAN NOTE
Numbness and reduced temperature sensation in her feet, normal lab work since last visit including CMP, TSH, Vit B12, Folate and RPR.  This maybe familial neuropathy.  She can use some lidocaine ointment topically for further assistance if needed.

## 2022-03-10 ENCOUNTER — PROCEDURE VISIT (OUTPATIENT)
Dept: NEUROLOGY | Facility: CLINIC | Age: 72
End: 2022-03-10

## 2022-03-10 DIAGNOSIS — M54.81 OCCIPITAL NEURALGIA OF RIGHT SIDE: Primary | ICD-10-CM

## 2022-03-10 PROCEDURE — 96372 THER/PROPH/DIAG INJ SC/IM: CPT | Performed by: PSYCHIATRY & NEUROLOGY

## 2022-03-10 RX ORDER — METHYLPREDNISOLONE ACETATE 40 MG/ML
40 INJECTION, SUSPENSION INTRA-ARTICULAR; INTRALESIONAL; INTRAMUSCULAR; SOFT TISSUE ONCE
Status: COMPLETED | OUTPATIENT
Start: 2022-03-10 | End: 2022-03-10

## 2022-03-10 RX ORDER — LIDOCAINE HYDROCHLORIDE 20 MG/ML
5 INJECTION, SOLUTION EPIDURAL; INFILTRATION; INTRACAUDAL; PERINEURAL ONCE
Status: COMPLETED | OUTPATIENT
Start: 2022-03-10 | End: 2022-03-10

## 2022-03-10 RX ADMIN — METHYLPREDNISOLONE ACETATE 40 MG: 40 INJECTION, SUSPENSION INTRA-ARTICULAR; INTRALESIONAL; INTRAMUSCULAR; SOFT TISSUE at 15:13

## 2022-03-10 RX ADMIN — LIDOCAINE HYDROCHLORIDE 5 ML: 20 INJECTION, SOLUTION EPIDURAL; INFILTRATION; INTRACAUDAL; PERINEURAL at 15:13

## 2022-03-10 NOTE — PROGRESS NOTES
Occipital Nerve Block Procedure Note:    PROCEDURE IN DETAIL:  The patient was injected in the right occipital nerves with 2% lidocaine, a total of 2.5 mL , and Depomedrol total of 0.5 mL or 20 mg after obtaining written consent. Sterile technique was used including sterile gloves and needles. Injection sites identified using known anatomical landmarks.  The area to be injected was prepped with sterilizing agent. The patient tolerated the procedure without any complications. She will be returning for injection as indicated and clinic for follow up as previously scheduled.

## 2022-05-27 ENCOUNTER — OFFICE VISIT (OUTPATIENT)
Dept: NEUROLOGY | Facility: CLINIC | Age: 72
End: 2022-05-27

## 2022-05-27 VITALS
BODY MASS INDEX: 30.78 KG/M2 | WEIGHT: 163 LBS | OXYGEN SATURATION: 97 % | SYSTOLIC BLOOD PRESSURE: 126 MMHG | HEIGHT: 61 IN | DIASTOLIC BLOOD PRESSURE: 82 MMHG | HEART RATE: 85 BPM

## 2022-05-27 DIAGNOSIS — M54.81 OCCIPITAL NEURALGIA OF RIGHT SIDE: Primary | ICD-10-CM

## 2022-05-27 PROCEDURE — 99213 OFFICE O/P EST LOW 20 MIN: CPT | Performed by: PSYCHIATRY & NEUROLOGY

## 2022-05-27 NOTE — ASSESSMENT & PLAN NOTE
71 year old right handed woman with right sided occipital headaches.  She is doing very well with the occipital nerve blocks which have been very helpful.  They are helpful for about 2-3 months and then her headaches slowly return.  She would like to have this done again.  She had a brain MRI scan done which did not demonstrate any acute intracranial abnormalities.  We will set her up to have these done again.

## 2022-05-27 NOTE — PROGRESS NOTES
Chief Complaint  Headache (Onb f/u- done well )    Subjective          Leny Neumann presents to [facility]UROLOG[facility] for   HISTORY OF PRESENT ILLNESS:    71 year old right handed woman who returns to neurology clinic for follow up evaluation and treatment of occipital headaches.  She reports her headaches starting in her neck and going to the back and side of her head only on the right side.  The pain is a sharp shooting pain which she rates as 7/10 on pain scale 1-10 when most severe.  She denies any light or sound sensitivity but maybe a little nausea but she is not sure if this is due to significant nausea.  She denies history of migraines.  No family history of migraines.  She has tried rizatriptan which did not help and made her feel weird.  She tells me she is sensitive to pharmaceutical medications.  She has tension in her neck and shoulders.  She had a CT scan of her neck done which did demonstrate degenerative disc changes.  She was treated with Tylenol most recently and NSAIDs in the past and following her initial visit I performed occipital nerve blocks for her on 3/10/2022 and she returns today and reports significant help with her headaches.  She had a brain MRI scan done which did not demonstrate any acute intracranial abnormalities.    Past Medical History:   Diagnosis Date   • Allergic    • Colon polyp     followed by GI, Dr. Akbar   • Elevated serum creatinine    • Headache    • Hypercalcemia    • Hyperlipidemia    • Hyperparathyroidism (HCC)    • Hypothyroidism     transient   • Menopausal hot flushes    • Migraine    • Osteopenia    • PONV (postoperative nausea and vomiting)    • Tinea pedis         Family History   Problem Relation Age of Onset   • Breast cancer Mother 64   • Lung cancer Father 64   • Heart failure Father    • Heart attack Father    • Alcohol abuse Daughter    • Early death Maternal Grandfather 35        farm accident   • Ovarian cancer Neg Hx    • Uterine  "cancer Neg Hx    • Colon cancer Neg Hx    • Deep vein thrombosis Neg Hx    • Pulmonary embolism Neg Hx         Social History     Socioeconomic History   • Marital status:    Tobacco Use   • Smoking status: Former Smoker     Packs/day: 1.00     Years: 30.00     Pack years: 30.00     Quit date: 2012     Years since quitting: 10.4   • Smokeless tobacco: Never Used   • Tobacco comment: smoked 1 ppd for 20 yr, quit in 2000, then relapsed for a year in 2011   Substance and Sexual Activity   • Alcohol use: Not Currently     Comment: Rarely   • Drug use: No   • Sexual activity: Defer     Partners: Male     Birth control/protection: Post-menopausal        I have reviewed and confirmed the accuracy of the ROS as documented by the MA/LPN/RN Remington Luna MD    Review of Systems   Neurological: Positive for dizziness and headache. Negative for tremors, seizures, syncope, facial asymmetry, speech difficulty, weakness, light-headedness, numbness, memory problem and confusion.   Psychiatric/Behavioral: Negative for agitation, behavioral problems, decreased concentration, dysphoric mood, hallucinations, self-injury, sleep disturbance, suicidal ideas, negative for hyperactivity, depressed mood and stress. The patient is not nervous/anxious.         Objective   Vital Signs:   /82   Pulse 85   Ht 154.9 cm (60.98\")   Wt 73.9 kg (163 lb)   SpO2 97%   BMI 30.81 kg/m²       PHYSICAL EXAM:    General   Mental Status - Alert. General Appearance - Well developed, Well groomed, Oriented and Cooperative. Orientation - Oriented X3.       Head and Neck  Head - normocephalic, atraumatic with no lesions or palpable masses. Tenderness to palpation over the occipital nerve in the right side.  Neck    Global Assessment - supple.       Eye   Sclera/Conjunctiva - Bilateral - Normal.    ENMT  Mouth and Throat   Oral Cavity/Oropharynx: Oropharynx - the soft palate,uvula and tongue are normal in appearance.    Chest and Lung Exam "   Chest - lung clear to auscultation bilaterally.    Cardiovascular   Cardiovascular examination reveals  - normal heart sounds, regular rate and rhythm.    Neurologic   Mental Status: Speech - Normal. Cognitive function - appropriate fund of knowledge. No impairment of attention, Impairment of concentration, impairment of long term memory or impairment of short term memory.  Cranial Nerves:   II Optic: Visual acuity - Left - Normal. Right - Normal. Visual fields - Normal (to confrontation).  III Oculomotor: Pupillary constriction - Left - Normal. Right - Normal.  VII Facial: - Normal Bilaterally.   IX Glossopharyngeal / X Vagus - Normal.  XI Accessory: Trapezius - Bilateral - Normal. Sternocleidomastoid - Bilateral - Normal.  XII Hypoglossal - Bilateral - Normal.  Eye Movements: - Normal Bilaterally.  Sensory:   Light Touch: Intact - Globally.  Motor:   Bulk and Contour: - Normal.  Tone: - Normal.  Tremor: Not present.  Strength: 5/5 normal muscle strength - All Muscles.   General Assessment of Reflexes: - deep tendon reflexes are normal. Coordination - No Impairment of finger-to-nose or Impairment of rapid alternating movements. Gait - Normal.     Result Review :                 Assessment and Plan    Problem List Items Addressed This Visit        Musculoskeletal and Injuries    Occipital neuralgia of right side - Primary    Current Assessment & Plan     71 year old right handed woman with right sided occipital headaches.  She is doing very well with the occipital nerve blocks which have been very helpful.  They are helpful for about 2-3 months and then her headaches slowly return.  She would like to have this done again.  She had a brain MRI scan done which did not demonstrate any acute intracranial abnormalities.  We will set her up to have these done again.                 Follow Up   No follow-ups on file.  Patient was given instructions and counseling regarding her condition or for health maintenance advice.  Please see specific information pulled into the AVS if appropriate.

## 2022-06-02 ENCOUNTER — TELEPHONE (OUTPATIENT)
Dept: NEUROLOGY | Facility: CLINIC | Age: 72
End: 2022-06-02

## 2022-06-02 NOTE — TELEPHONE ENCOUNTER
"Called Medicare to be sure no auth is required for pts ONB as her supplement had lapsed. I was advised no auth is required with Ref# [id number]  Tried calling pt to schedule- recd vm-     **HUB**  Pt can be scheduled on a Wednesday- please put\" ONB right side only Ref# [id number]\" in the appt notes  "

## 2022-06-08 NOTE — TELEPHONE ENCOUNTER
Caller: Leny Neumann JULIUS    Relationship: Self    Best call back number: [phone]    What was the call regarding: PT RETURNING PHONE CALL TO SCHEDULE ONB APPT W/ DR. SIMS.    HUB UNABLE TO SCHEDULE INJECTION VISIT TYPES, CALL WARM-TRANSFERRED ACCORDINGLY.    Call warm-transferred to: REY    DOCUMENTING PER HUB PROTOCOL.

## 2022-06-09 ENCOUNTER — PROCEDURE VISIT (OUTPATIENT)
Dept: NEUROLOGY | Facility: CLINIC | Age: 72
End: 2022-06-09

## 2022-06-09 DIAGNOSIS — M54.81 OCCIPITAL NEURALGIA OF RIGHT SIDE: Primary | ICD-10-CM

## 2022-06-09 PROCEDURE — 64405 NJX AA&/STRD GR OCPL NRV: CPT | Performed by: PSYCHIATRY & NEUROLOGY

## 2022-06-09 RX ORDER — LIDOCAINE HYDROCHLORIDE 20 MG/ML
5 INJECTION, SOLUTION EPIDURAL; INFILTRATION; INTRACAUDAL; PERINEURAL ONCE
Status: COMPLETED | OUTPATIENT
Start: 2022-06-09 | End: 2022-06-09

## 2022-06-09 RX ORDER — METHYLPREDNISOLONE ACETATE 40 MG/ML
40 INJECTION, SUSPENSION INTRA-ARTICULAR; INTRALESIONAL; INTRAMUSCULAR; SOFT TISSUE ONCE
Status: COMPLETED | OUTPATIENT
Start: 2022-06-09 | End: 2022-06-09

## 2022-06-09 RX ADMIN — LIDOCAINE HYDROCHLORIDE 5 ML: 20 INJECTION, SOLUTION EPIDURAL; INFILTRATION; INTRACAUDAL; PERINEURAL at 11:31

## 2022-06-09 RX ADMIN — METHYLPREDNISOLONE ACETATE 40 MG: 40 INJECTION, SUSPENSION INTRA-ARTICULAR; INTRALESIONAL; INTRAMUSCULAR; SOFT TISSUE at 11:32

## 2022-06-09 NOTE — PROGRESS NOTES
Occipital Nerve Block Procedure Note:    PROCEDURE IN DETAIL:  The patient was injected in the right occipital nerve with 2% lidocaine, a total of 2.5 mL , and Depomedrol total of 0.5 mL or 20 mg after obtaining written consent. Sterile technique was used including sterile gloves and needles. Injection sites identified using known anatomical landmarks.  The area to be injected was prepped with sterilizing agent. The patient tolerated the procedure without any complications. She will be returning for injection as indicated and clinic for follow up as previously scheduled.

## 2022-06-17 ENCOUNTER — TELEPHONE (OUTPATIENT)
Dept: NEUROLOGY | Facility: CLINIC | Age: 72
End: 2022-06-17

## 2022-06-17 NOTE — TELEPHONE ENCOUNTER
Caller: Leny Neumann    Relationship: Self    Best call back number: [phone]    What was the call regarding: PT CALLED TO RESCHEDULE HER 1 MONTH F/U VISIT ON 7/7/22 W/ DR. SIMS AFTER ONB APPT ON 6/9/22. PT HAS A CONFLICTING APPT FOR BONE DENSITY SCAN AND IS UNABLE TO MAKE APPT W/ DR. SIMS ON 7/7/22.    HUB UNABLE TO RESCHEDULED WITHIN THE SUGGESTED F/U TIMEFRAME.    Do you require a callback: YES, PLEASE    PLEASE REVIEW AND ADVISE.

## 2022-07-07 ENCOUNTER — HOSPITAL ENCOUNTER (OUTPATIENT)
Dept: BONE DENSITY | Facility: HOSPITAL | Age: 72
Discharge: HOME | End: 2022-07-07
Admitting: NURSE PRACTITIONER

## 2022-07-07 DIAGNOSIS — Z78.0 POSTMENOPAUSAL: ICD-10-CM

## 2022-07-07 PROCEDURE — 77080 DXA BONE DENSITY AXIAL: CPT

## 2022-07-21 ENCOUNTER — OFFICE VISIT (OUTPATIENT)
Dept: NEUROLOGY | Facility: CLINIC | Age: 72
End: 2022-07-21

## 2022-07-21 ENCOUNTER — TELEPHONE (OUTPATIENT)
Dept: FAMILY MEDICINE CLINIC | Facility: CLINIC | Age: 72
End: 2022-07-21

## 2022-07-21 VITALS
HEART RATE: 83 BPM | DIASTOLIC BLOOD PRESSURE: 78 MMHG | HEIGHT: 61 IN | OXYGEN SATURATION: 97 % | SYSTOLIC BLOOD PRESSURE: 134 MMHG | BODY MASS INDEX: 31.72 KG/M2 | WEIGHT: 168 LBS

## 2022-07-21 DIAGNOSIS — M54.81 OCCIPITAL NEURALGIA OF RIGHT SIDE: Primary | ICD-10-CM

## 2022-07-21 PROCEDURE — 99213 OFFICE O/P EST LOW 20 MIN: CPT | Performed by: PSYCHIATRY & NEUROLOGY

## 2022-07-21 NOTE — PROGRESS NOTES
Chief Complaint  ONB Follow up and Headache    Subjective          Leny Neumann presents to [facility] NEUROLOGY for   HISTORY OF PRESENT ILLNESS:    72 year old right handed woman who returns to neurology clinic for follow up evaluation and treatment of occipital headaches.  She reports her headaches starting in her neck and going to the back and side of her head only on the right side.  The pain is a sharp shooting pain which she rates as 7/10 on pain scale 1-10 when most severe.  She denies any light or sound sensitivity but maybe a little nausea but she is not sure if this is due to significant nausea.  She denies history of migraines.  No family history of migraines.  She has tried rizatriptan which did not help and made her feel weird.  She tells me she is sensitive to pharmaceutical medications.  She has tension in her neck and shoulders.  She had a CT scan of her neck done which did demonstrate degenerative disc changes.  She was treated with Tylenol most recently and NSAIDs in the past and following her initial visit I performed occipital nerve blocks for her on 3/10/2022 and she reports significant help with her headaches.  More recently I repeated the ONBs on 6/6/2022 which she tells me was again very helpful for the occipital neuralgia.  She had a brain MRI scan done which did not demonstrate any acute intracranial abnormalities.  Overall she is doing well at this time.      Past Medical History:   Diagnosis Date   • Allergic    • Colon polyp     followed by GI, Dr. Akbar   • Elevated serum creatinine    • Headache    • Hypercalcemia    • Hyperlipidemia    • Hyperparathyroidism (HCC)    • Hypothyroidism     transient   • Menopausal hot flushes    • Migraine    • Osteopenia    • PONV (postoperative nausea and vomiting)    • Tinea pedis         Family History   Problem Relation Age of Onset   • Breast cancer Mother 64   • Lung cancer Father 64   • Heart failure Father    • Heart  "attack Father    • Alcohol abuse Daughter    • Early death Maternal Grandfather 35        farm accident   • Ovarian cancer Neg Hx    • Uterine cancer Neg Hx    • Colon cancer Neg Hx    • Deep vein thrombosis Neg Hx    • Pulmonary embolism Neg Hx         Social History     Socioeconomic History   • Marital status:    Tobacco Use   • Smoking status: Former Smoker     Packs/day: 1.00     Years: 30.00     Pack years: 30.00     Quit date: 2012     Years since quitting: 10.5   • Smokeless tobacco: Never Used   • Tobacco comment: smoked 1 ppd for 20 yr, quit in 2000, then relapsed for a year in 2011   Substance and Sexual Activity   • Alcohol use: Not Currently     Comment: Rarely   • Drug use: No   • Sexual activity: Defer     Partners: Male     Birth control/protection: Post-menopausal        I have reviewed and confirmed the accuracy of the ROS as documented by the MA/LPN/RN Remington Luna MD    Review of Systems   Musculoskeletal: Negative for gait problem.   Neurological: Negative for dizziness, tremors, seizures, syncope, facial asymmetry, speech difficulty, weakness, light-headedness, numbness, headache, memory problem and confusion.   Psychiatric/Behavioral: Negative for agitation, behavioral problems, decreased concentration, dysphoric mood, hallucinations, self-injury, sleep disturbance, suicidal ideas, negative for hyperactivity, depressed mood and stress. The patient is not nervous/anxious.         Objective   Vital Signs:   /78 (BP Location: Left arm, Patient Position: Sitting, Cuff Size: Adult)   Pulse 83   Ht 154.9 cm (60.98\")   Wt 76.2 kg (168 lb)   SpO2 97%   BMI 31.76 kg/m²       PHYSICAL EXAM:    General   Mental Status - Alert. General Appearance - Well developed, Well groomed, Oriented and Cooperative. Orientation - Oriented X3.       Head and Neck  Head - normocephalic, atraumatic with no lesions or palpable masses.  Neck    Global Assessment - supple.       Eye "   Sclera/Conjunctiva - Bilateral - Normal.    ENMT  Mouth and Throat   Oral Cavity/Oropharynx: Oropharynx - the soft palate,uvula and tongue are normal in appearance.    Chest and Lung Exam   Chest - lung clear to auscultation bilaterally.    Cardiovascular   Cardiovascular examination reveals  - normal heart sounds, regular rate and rhythm.    Neurologic   Mental Status: Speech - Normal. Cognitive function - appropriate fund of knowledge. No impairment of attention, Impairment of concentration, impairment of long term memory or impairment of short term memory.  Cranial Nerves:   II Optic: Visual acuity - Left - Normal. Right - Normal. Visual fields - Normal (to confrontation).  III Oculomotor: Pupillary constriction - Left - Normal. Right - Normal.  VII Facial: - Normal Bilaterally.   IX Glossopharyngeal / X Vagus - Normal.  XI Accessory: Trapezius - Bilateral - Normal. Sternocleidomastoid - Bilateral - Normal.  XII Hypoglossal - Bilateral - Normal.  Eye Movements: - Normal Bilaterally.  Sensory:   Light Touch: Intact - Globally.  Motor:   Bulk and Contour: - Normal.  Tone: - Normal.  Tremor: Not present.  Strength: 5/5 normal muscle strength - All Muscles.   General Assessment of Reflexes: - deep tendon reflexes are normal. Coordination - No Impairment of finger-to-nose or Impairment of rapid alternating movements. Gait - Normal.      Result Review :                 Assessment and Plan {CC Problem List  Visit Diagnosis  ROS  Review (Popup)  Health Maintenance  Quality  BestPractice  Medications  SmartSets  SnapShot Encounters  Media :23}   Problem List Items Addressed This Visit        Musculoskeletal and Injuries    Occipital neuralgia of right side - Primary    Current Assessment & Plan     72 year old right handed woman with occipital headaches.  She reports her headaches starting in her neck and going to the back and side of her head only on the right side.  The pain is a sharp shooting pain which  she rates as 7/10 on pain scale 1-10 when most severe.  She denies any light or sound sensitivity but maybe a little nausea but she is not sure if this is due to significant nausea.  She denies history of migraines.  No family history of migraines.  She has tried rizatriptan which did not help and made her feel weird.  She tells me she is sensitive to pharmaceutical medications.  She has tension in her neck and shoulders.  She had a CT scan of her neck done which did demonstrate degenerative disc changes.  She was treated with Tylenol most recently and NSAIDs in the past and following her initial visit I performed occipital nerve blocks for her on 3/10/2022 and she reports significant help with her headaches.  More recently I repeated the right sided ONB on 6/6/2022 which she tells me was again very helpful for the occipital neuralgia.  She had a brain MRI scan done which did not demonstrate any acute intracranial abnormalities.  She would like to get set up approximately every 3 months to have the ONB repeated as it helps significantly lasting for about 3 months.  I will set her up for right sided ONB on 9/8 at 11:20 AM.  I will see her back in 3 months for reevaluation.                   I spent 21 minutes caring for Leny on this date of service. This time includes time spent by me in the following activities:preparing for the visit, obtaining and/or reviewing a separately obtained history, performing a medically appropriate examination and/or evaluation , counseling and educating the patient/family/caregiver, ordering medications, tests, or procedures, documenting information in the medical record and care coordination    Follow Up   Return in about 3 months (around 10/21/2022).  Patient was given instructions and counseling regarding her condition or for health maintenance advice. Please see specific information pulled into the AVS if appropriate.

## 2022-07-21 NOTE — TELEPHONE ENCOUNTER
Caller: Leny Neumann    Relationship: Self    Best call back number: [phone] (H)    PATIENT CALLED STATING SHE WAS SUPPOSED TO HAVE AN APPOINTMENT SCHEDULED WITH A NEUROSURGEON BUT THERE IS NOT ANYTHING SCHEDULED. SHE STATES SHE SEES A NEUROLOGIST BUT NEEDS TO SEE  NEUROSURGEON.     PLEASE CALL PATIENT WITH ADDITIONAL INFORMATION.

## 2022-07-21 NOTE — ASSESSMENT & PLAN NOTE
72 year old right handed woman with occipital headaches.  She reports her headaches starting in her neck and going to the back and side of her head only on the right side.  The pain is a sharp shooting pain which she rates as 7/10 on pain scale 1-10 when most severe.  She denies any light or sound sensitivity but maybe a little nausea but she is not sure if this is due to significant nausea.  She denies history of migraines.  No family history of migraines.  She has tried rizatriptan which did not help and made her feel weird.  She tells me she is sensitive to pharmaceutical medications.  She has tension in her neck and shoulders.  She had a CT scan of her neck done which did demonstrate degenerative disc changes.  She was treated with Tylenol most recently and NSAIDs in the past and following her initial visit I performed occipital nerve blocks for her on 3/10/2022 and she reports significant help with her headaches.  More recently I repeated the right sided ONB on 6/6/2022 which she tells me was again very helpful for the occipital neuralgia.  She had a brain MRI scan done which did not demonstrate any acute intracranial abnormalities.  She would like to get set up approximately every 3 months to have the ONB repeated as it helps significantly lasting for about 3 months.  I will set her up for right sided ONB on 9/8 at 11:20 AM.  I will see her back in 3 months for reevaluation.

## 2022-07-28 ENCOUNTER — OFFICE VISIT (OUTPATIENT)
Dept: FAMILY MEDICINE CLINIC | Facility: CLINIC | Age: 72
End: 2022-07-28

## 2022-07-28 VITALS
DIASTOLIC BLOOD PRESSURE: 80 MMHG | BODY MASS INDEX: 33.38 KG/M2 | HEIGHT: 60 IN | SYSTOLIC BLOOD PRESSURE: 148 MMHG | HEART RATE: 87 BPM | WEIGHT: 170 LBS | RESPIRATION RATE: 18 BRPM | OXYGEN SATURATION: 98 %

## 2022-07-28 DIAGNOSIS — E21.3 HYPERPARATHYROIDISM: ICD-10-CM

## 2022-07-28 DIAGNOSIS — M54.81 OCCIPITAL NEURALGIA OF RIGHT SIDE: Primary | ICD-10-CM

## 2022-07-28 DIAGNOSIS — R59.1 LYMPHADENOPATHY: ICD-10-CM

## 2022-07-28 DIAGNOSIS — M54.2 NECK PAIN: ICD-10-CM

## 2022-07-28 PROCEDURE — 99213 OFFICE O/P EST LOW 20 MIN: CPT | Performed by: NURSE PRACTITIONER

## 2022-07-28 NOTE — PROGRESS NOTES
Subjective   Leny Neumann is a 72 y.o. female.     History of Present Illness   Established patient, new to this provider.    Chronic elevated calcium, PTH was also elevated, patient is here for endocrinology referral. She stopped oral calcium and received Dexa scan.     Chronic occipital neuralgia x approx 2 years. She sees neuro for nerve block injections Dr Luna. She is still having right sided neck pain that burns and stings , nagging pain, she takes tylenol for pain. She is unsure if this neck pain is ENT related , GERD related, DDD or hyperparathyroidism. She has had 2 scopes down nose, CT scan of neck And Brain MRI.     The following portions of the patient's history were reviewed and updated as appropriate: allergies, current medications, past family history, past medical history, past social history, past surgical history and problem list.    Review of Systems   Neurological: Positive for headache.       Objective   Physical Exam  Constitutional:       Appearance: Normal appearance.   HENT:      Head: Normocephalic.      Right Ear: Tympanic membrane normal.      Left Ear: Tympanic membrane normal.      Nose: Nose normal.      Mouth/Throat:      Mouth: Mucous membranes are moist.   Eyes:      Pupils: Pupils are equal, round, and reactive to light.   Neck:        Comments: bilateral submandib nodes   Cardiovascular:      Rate and Rhythm: Normal rate and regular rhythm.      Pulses: Normal pulses.      Heart sounds: Normal heart sounds.   Pulmonary:      Effort: Pulmonary effort is normal.      Breath sounds: Normal breath sounds.   Abdominal:      General: Abdomen is flat. Bowel sounds are normal.      Palpations: Abdomen is soft.   Musculoskeletal:         General: Normal range of motion.      Cervical back: Normal range of motion.   Skin:     General: Skin is warm.   Neurological:      General: No focal deficit present.      Mental Status: She is alert.   Psychiatric:         Mood and Affect: Mood  normal.         Vitals:    07/28/22 0931   BP: 148/80   Pulse: 87   Resp: 18   SpO2: 98%     Body mass index is 33.2 kg/m².    Procedures    Assessment & Plan   Problems Addressed this Visit        Musculoskeletal and Injuries    Occipital neuralgia of right side - Primary    Relevant Orders    Ambulatory Referral to Endocrinology    US Head Neck Soft Tissue      Other Visit Diagnoses     Hyperparathyroidism (HCC)        Relevant Orders    Ambulatory Referral to Endocrinology    US Head Neck Soft Tissue    Neck pain        Relevant Orders    Ambulatory Referral to Endocrinology    US Head Neck Soft Tissue    Lymphadenopathy        Relevant Orders    Ambulatory Referral to Endocrinology    US Head Neck Soft Tissue      Diagnoses       Codes Comments    Occipital neuralgia of right side    -  Primary ICD-10-CM: M54.81  ICD-9-CM: 723.8     Hyperparathyroidism (HCC)     ICD-10-CM: E21.3  ICD-9-CM: 252.00     Neck pain     ICD-10-CM: M54.2  ICD-9-CM: 723.1     Lymphadenopathy     ICD-10-CM: R59.1  ICD-9-CM: 785.6         Orders Placed This Encounter   Procedures   • US Head Neck Soft Tissue     Standing Status:   Future     Standing Expiration Date:   7/28/2023     Order Specific Question:   Reason for Exam:     Answer:   hyperparathyroidism, enlarged bilateral submental nodes, R side neck pain     Order Specific Question:   Release to patient     Answer:   Immediate   • Ambulatory Referral to Endocrinology     Referral Priority:   Routine     Referral Type:   Consultation     Referral Reason:   Specialty Services Required     Requested Specialty:   Endocrinology     Number of Visits Requested:   1   Hyperparathyroidism- refer endo    Neck pain- tylenol as directed (max 4 g/day) for pain, enc ice/heat, gentle ROM, cw neuro for injections , consider Cymbalta for worsening pain     Lymphadenopathy-order neck ultrasound to eval       Education provided in AVS   Return if symptoms worsen or fail to improve.

## 2022-08-11 ENCOUNTER — HOSPITAL ENCOUNTER (OUTPATIENT)
Dept: ULTRASOUND IMAGING | Facility: HOSPITAL | Age: 72
Discharge: HOME | End: 2022-08-11
Admitting: NURSE PRACTITIONER

## 2022-08-11 DIAGNOSIS — R59.1 LYMPHADENOPATHY: ICD-10-CM

## 2022-08-11 DIAGNOSIS — E21.3 HYPERPARATHYROIDISM: ICD-10-CM

## 2022-08-11 DIAGNOSIS — M54.81 OCCIPITAL NEURALGIA OF RIGHT SIDE: ICD-10-CM

## 2022-08-11 DIAGNOSIS — M54.2 NECK PAIN: ICD-10-CM

## 2022-08-11 PROCEDURE — 76536 US EXAM OF HEAD AND NECK: CPT

## 2022-08-31 ENCOUNTER — OFFICE VISIT (OUTPATIENT)
Dept: ENDOCRINOLOGY | Age: 72
End: 2022-08-31

## 2022-08-31 VITALS
DIASTOLIC BLOOD PRESSURE: 72 MMHG | HEIGHT: 60 IN | WEIGHT: 171 LBS | HEART RATE: 81 BPM | SYSTOLIC BLOOD PRESSURE: 130 MMHG | BODY MASS INDEX: 33.57 KG/M2 | OXYGEN SATURATION: 96 % | TEMPERATURE: 97.5 F

## 2022-08-31 DIAGNOSIS — E83.52 HYPERCALCEMIA: ICD-10-CM

## 2022-08-31 DIAGNOSIS — E21.3 HYPERPARATHYROIDISM: Primary | ICD-10-CM

## 2022-08-31 DIAGNOSIS — E55.9 VITAMIN D DEFICIENCY: ICD-10-CM

## 2022-08-31 PROCEDURE — 99204 OFFICE O/P NEW MOD 45 MIN: CPT | Performed by: INTERNAL MEDICINE

## 2022-08-31 NOTE — PROGRESS NOTES
Chief Complaint  Occipital neuralgia of right side, Hyperparathyroidism, Neck Pain, and Swollen Glands    Subjective          Leny Neumann presents to [facility] ENDOCRINOLOGY  History of Present Illness    72-year-old  female presents for evaluation and treatment recommendations for hypercalcemia.  She has a juvenile cases .    About 5 years ago she had a bone density evaluation done.  At that time she was medically told to discontinue her supplemental calcium.  No further information was provided to her at that time.  Most recently she had another bone density evaluation done in early July, 2022 and she was told by ancillary staff at that time that her calcium level is elevated.    Her main symptom that has been bothering her has been right upper neck pain for the past 17 months or so she has been evaluated by to ENT physicians and other doctors, however we have not been able to establish a cause for her pain/discomfort.  She has had imaging studies done, without any clear etiology identified for her pain.  She requires regular Tylenol ingestion to relieve herself of the pain.    Patient is known to have an elevated calcium level at least for the past 5 years..    The patient is not on HCTZ or Lithium.    Patient is not on calcium supplementation or vitamin D supplementation.    Patient has been experiencing non-specific symptoms, including fatigue, polyuria/polydipsia.  - denies hematuria, kidney stones. Father had a long standing history of kidney stones.  - fracture- across foot with a fall. Her DXA scan has shown osteopenia and recently osteoporosis  - she occasionally has nausea, but no vomiting. She denies abdominal pain. She has constipation.  - she denies anxiety, depression, cognitive impairment    There is no family history of hypercalcemia, parathyroid disease or any hormone secreting tumors in the family.    Labs:   Latest Reference Range & Units 12/23/21 00:00  "12/30/21 00:00   Glucose 65 - 99 mg/dL 91    Calcium 8.7 - 10.3 mg/dL 10.7 (H)    eGFR Non African Am >59 mL/min/1.73 68    eGFR African Am >59 mL/min/1.73 79    PTH Intact (Serial Monitor) 15 - 65 pg/mL  84 (H)   LDL Cholesterol  0 - 99 mg/dL 173 (H)    (H): Data is abnormally high     Latest Reference Range & Units 04/19/19 09:27   25 Hydroxy, Vitamin D 30.0 - 100.0 ng/ml 36.2       Component   Ref Range & Units 8 mo ago   (12/23/21) 8 mo ago   (12/9/21) 2 yr ago   (1/31/20) 3 yr ago   (4/19/19) 3 yr ago   (4/2/19) 4 yr ago   (3/21/18)   eGFR Non African Am   >59 mL/min/1.73 68  70 R   49 Low  R  53 Low  R  66 R    Calcium   8.7 - 10.3 mg/dL 10.7 High   10.7 High  R   11.2 High  R  11.0 High  R  10.4 R    Albumin   3.8 - 4.8 g/dL 4.7  4.70 R    4.80 R  4.70 R        Imaging studies:    Bone density evaluation 7/7/2022 FINDINGS:   LUMBAR SPINE:  The BMD measured in the L1-L4 is 0.795 g/cm2 for a  T-score of -2.3 and a Z-score of -0.2.     LEFT HIP: The BMD for the femoral neck is 0.593 g/cm2 for a T score of   -2.3 and a Z score of -0.5.     RIGHT HIP:  The BMD for the femoral neck is 0.583 g/cm2 for a T score of  -2.4 and a Z score of -0.6.     LEFT FOREARM: The BMD for the forearm 1/3 is 0.532 g/cm2 for a T-score  of -2.7 and a Z-score of -0.6.     IMPRESSION:  Bone mineral density T score is lowest within the left  forearm indicating osteoporosis.      Reviewed PMH, Allergies, Medication list, family history and social history.    ROS as per HPI, otherwise negative    Objective   Vital Signs:   /72   Pulse 81   Temp 97.5 °F (36.4 °C)   Ht 152.4 cm (60\")   Wt 77.6 kg (171 lb)   SpO2 96%   BMI 33.40 kg/m²     Physical Exam  Vitals and nursing note reviewed.   Constitutional:       General: She is not in acute distress.     Appearance: She is not ill-appearing, toxic-appearing or diaphoretic.   HENT:      Head: Normocephalic and atraumatic.      Nose: Nose normal.      Mouth/Throat:      Mouth: Mucous " membranes are moist.      Pharynx: Oropharynx is clear. No oropharyngeal exudate or posterior oropharyngeal erythema.   Eyes:      General: No scleral icterus.     Extraocular Movements: Extraocular movements intact.      Conjunctiva/sclera: Conjunctivae normal.      Pupils: Pupils are equal, round, and reactive to light.   Neck:      Thyroid: No thyroid mass, thyromegaly or thyroid tenderness.      Vascular: No carotid bruit.      Trachea: Trachea normal.   Cardiovascular:      Rate and Rhythm: Normal rate and regular rhythm.      Pulses: Normal pulses.      Heart sounds: Normal heart sounds. No murmur heard.    No friction rub. No gallop.   Pulmonary:      Effort: Pulmonary effort is normal. No respiratory distress.      Breath sounds: Normal breath sounds. No stridor. No wheezing, rhonchi or rales.   Chest:      Chest wall: No tenderness.   Abdominal:      General: Bowel sounds are normal. There is no distension.      Palpations: Abdomen is soft. There is no mass.      Tenderness: There is no abdominal tenderness. There is no rebound.   Musculoskeletal:         General: No swelling, tenderness, deformity or signs of injury. Normal range of motion.      Cervical back: Normal range of motion and neck supple. No rigidity or tenderness.      Right lower leg: No edema.      Left lower leg: No edema.   Lymphadenopathy:      Cervical: No cervical adenopathy.   Skin:     General: Skin is warm and dry.      Capillary Refill: Capillary refill takes 2 to 3 seconds.      Coloration: Skin is not jaundiced or pale.      Findings: No bruising, erythema, lesion or rash.   Neurological:      General: No focal deficit present.      Mental Status: She is alert and oriented to person, place, and time. Mental status is at baseline.      Cranial Nerves: No cranial nerve deficit.      Sensory: No sensory deficit.      Motor: No weakness.      Coordination: Coordination normal.      Gait: Gait normal.      Deep Tendon Reflexes: Reflexes  normal.   Psychiatric:         Mood and Affect: Mood normal.         Behavior: Behavior normal.         Thought Content: Thought content normal.         Judgment: Judgment normal.        Result Review :     CMP    CMP 12/9/21 12/23/21   Glucose 96 91   BUN 16 21   Creatinine 0.81 0.87   eGFR Non  Am 70 68   eGFR African Am  79   Sodium 140 140   Potassium 4.4 5.2   Chloride 106 102   Calcium 10.7 (A) 10.7 (A)   Total Protein  6.9   Albumin 4.70 4.7   Globulin  2.2   Total Bilirubin 0.3 0.3   Alkaline Phosphatase 72 69   AST (SGOT) 18 15   ALT (SGPT) 25 18   (A) Abnormal value       Comments are available for some flowsheets but are not being displayed.                     Assessment and Plan    Diagnoses and all orders for this visit:    1. Hyperparathyroidism (HCC) (Primary)  -     Cancel: Comprehensive Metabolic Panel  -     Cancel: PTH, Intact  -     Cancel: Calcium, Ionized  -     Cancel: Phosphorus  -     Cancel: Magnesium  -     Cancel: Vitamin D 25 Hydroxy  -     Cancel: Calcium, Urine, 24 Hour - Urine, Clean Catch  -     Calcium, Ionized  -     Comprehensive Metabolic Panel  -     Vitamin D 25 Hydroxy  -     PTH, Intact  -     Phosphorus  -     Magnesium  -     Calcium, Urine, 24 Hour - Urine, Clean Catch; Future    2. Hypercalcemia  -     Cancel: Comprehensive Metabolic Panel  -     Cancel: PTH, Intact  -     Cancel: Calcium, Ionized  -     Cancel: Phosphorus  -     Cancel: Magnesium  -     Cancel: Vitamin D 25 Hydroxy  -     Cancel: Calcium, Urine, 24 Hour - Urine, Clean Catch  -     Calcium, Ionized  -     Comprehensive Metabolic Panel  -     Vitamin D 25 Hydroxy  -     PTH, Intact  -     Phosphorus  -     Magnesium  -     Calcium, Urine, 24 Hour - Urine, Clean Catch; Future    3. Vitamin D deficiency  -     Cancel: Vitamin D 25 Hydroxy  -     Vitamin D 25 Hydroxy    Suspect primary hyperparathyroidism.  Will obtain biochemical testing to confirm the diagnosis.  Next visit, if biochemical testing  is consistent with PHP we will proceed with imaging studies and referral to ENT.    As for the patient's right upper neck pain, no clear etiology has been identified.  The pain has been present for 17 months or longer.  She has had to ENT evaluations and imaging studies of her neck, including a neck ultrasound and CT scan of the head and neck.  She is scheduled to see a neurosurgeon, possibly for nerve compression evaluation as the etiology of the pain.      Follow Up   Return in about 4 weeks (around 9/28/2022).  Patient was given instructions and counseling regarding her condition or for health maintenance advice. Please see specific information pulled into the AVS if appropriate.

## 2022-09-01 LAB
25(OH)D3+25(OH)D2 SERPL-MCNC: 10.8 NG/ML (ref 30–100)
ALBUMIN SERPL-MCNC: 4.6 G/DL (ref 3.8–4.8)
ALBUMIN/GLOB SERPL: 1.8 {RATIO} (ref 1.2–2.2)
ALP SERPL-CCNC: 67 IU/L (ref 44–121)
ALT SERPL-CCNC: 24 IU/L (ref 0–32)
AST SERPL-CCNC: 20 IU/L (ref 0–40)
BILIRUB SERPL-MCNC: 0.2 MG/DL (ref 0–1.2)
BUN SERPL-MCNC: 14 MG/DL (ref 8–27)
BUN/CREAT SERPL: 17 (ref 12–28)
CA-I SERPL ISE-MCNC: 5.9 MG/DL (ref 4.5–5.6)
CALCIUM SERPL-MCNC: 10.8 MG/DL (ref 8.7–10.3)
CHLORIDE SERPL-SCNC: 104 MMOL/L (ref 96–106)
CO2 SERPL-SCNC: 25 MMOL/L (ref 20–29)
CREAT SERPL-MCNC: 0.82 MG/DL (ref 0.57–1)
EGFRCR-CYS SERPLBLD CKD-EPI 2021: 77 ML/MIN/1.73
GLOBULIN SER CALC-MCNC: 2.6 G/DL (ref 1.5–4.5)
GLUCOSE SERPL-MCNC: 92 MG/DL (ref 65–99)
MAGNESIUM SERPL-MCNC: 2.2 MG/DL (ref 1.6–2.3)
PHOSPHATE SERPL-MCNC: 2.8 MG/DL (ref 3–4.3)
POTASSIUM SERPL-SCNC: 5 MMOL/L (ref 3.5–5.2)
PROT SERPL-MCNC: 7.2 G/DL (ref 6–8.5)
PTH-INTACT SERPL-MCNC: 93 PG/ML (ref 15–65)
SODIUM SERPL-SCNC: 141 MMOL/L (ref 134–144)

## 2022-09-08 ENCOUNTER — PROCEDURE VISIT (OUTPATIENT)
Dept: NEUROLOGY | Facility: CLINIC | Age: 72
End: 2022-09-08

## 2022-09-08 DIAGNOSIS — M54.81 OCCIPITAL NEURALGIA OF RIGHT SIDE: Primary | ICD-10-CM

## 2022-09-08 PROCEDURE — 64405 NJX AA&/STRD GR OCPL NRV: CPT | Performed by: PSYCHIATRY & NEUROLOGY

## 2022-09-08 RX ORDER — LIDOCAINE HYDROCHLORIDE 20 MG/ML
2.5 INJECTION, SOLUTION INFILTRATION; PERINEURAL ONCE
Status: COMPLETED | OUTPATIENT
Start: 2022-09-08 | End: 2022-09-08

## 2022-09-08 RX ORDER — METHYLPREDNISOLONE ACETATE 40 MG/ML
20 INJECTION, SUSPENSION INTRA-ARTICULAR; INTRALESIONAL; INTRAMUSCULAR; SOFT TISSUE ONCE
Status: COMPLETED | OUTPATIENT
Start: 2022-09-08 | End: 2022-09-08

## 2022-09-08 RX ADMIN — METHYLPREDNISOLONE ACETATE 20 MG: 40 INJECTION, SUSPENSION INTRA-ARTICULAR; INTRALESIONAL; INTRAMUSCULAR; SOFT TISSUE at 11:06

## 2022-09-08 RX ADMIN — LIDOCAINE HYDROCHLORIDE 2.5 ML: 20 INJECTION, SOLUTION INFILTRATION; PERINEURAL at 11:06

## 2022-09-26 ENCOUNTER — TELEPHONE (OUTPATIENT)
Dept: FAMILY MEDICINE CLINIC | Facility: CLINIC | Age: 72
End: 2022-09-26

## 2022-09-26 NOTE — TELEPHONE ENCOUNTER
Caller: Leny Neumann    Relationship: Self    Best call back number: [phone] (H      What was the call regarding: PATIENT WOULD LIKE TO KNOW IF THERE IS A COVID BOOSTER SHE IS NEEDING TO GET. PATIENT STATES SHE HAS SEEN ON T.V THAT THERE IS A NEW COVID BOOSTER BUT SHE IS NOT SURE IF SHE NEEDS TO GET IT.     PLEASE ADVISE     Do you require a callback: YES

## 2022-11-15 ENCOUNTER — OFFICE VISIT (OUTPATIENT)
Dept: ENDOCRINOLOGY | Age: 72
End: 2022-11-15

## 2022-11-15 VITALS
BODY MASS INDEX: 33.34 KG/M2 | OXYGEN SATURATION: 97 % | HEART RATE: 79 BPM | DIASTOLIC BLOOD PRESSURE: 88 MMHG | WEIGHT: 169.8 LBS | HEIGHT: 60 IN | TEMPERATURE: 97.1 F | SYSTOLIC BLOOD PRESSURE: 136 MMHG

## 2022-11-15 DIAGNOSIS — E83.52 HYPERCALCEMIA: ICD-10-CM

## 2022-11-15 DIAGNOSIS — E55.9 VITAMIN D DEFICIENCY: ICD-10-CM

## 2022-11-15 DIAGNOSIS — E21.3 HYPERPARATHYROIDISM: Primary | ICD-10-CM

## 2022-11-15 PROCEDURE — 99214 OFFICE O/P EST MOD 30 MIN: CPT | Performed by: INTERNAL MEDICINE

## 2022-11-15 RX ORDER — ERGOCALCIFEROL 1.25 MG/1
CAPSULE ORAL
Qty: 15 CAPSULE | Refills: 3 | Status: SHIPPED | OUTPATIENT
Start: 2022-11-15 | End: 2023-03-06

## 2022-11-15 NOTE — PROGRESS NOTES
Chief Complaint  Hyperparathyroidism (Pt states that energy levels have been good, weight is higher than expected, no family hx of thyroid disease. )    Subjective      Interval history is negative for any new symptoms, ER visits or hospitalizations.  The patient noticed her lab results and started taking a multivitamin and vitamin D Gummies.  She is unable to recall the dose of vitamin D she is on.    Other than this, she continues to experience pain in a localized area in her right upper neck.  She still notices the persistent swelling/lump.    In addition, she has noticed 2 skin lesions over her right lower cheek.  These are skin colored and about 5 mm warty lesions.  She has an appointment scheduled with a dermatologist.         Latest Reference Range & Units 08/31/22 12:18   Glucose 65 - 99 mg/dL 92   Calcium 8.7 - 10.3 mg/dL 10.8 (H)   Ionized Calcium 4.5 - 5.6 mg/dL 5.9 (H)   EGFR Result >59 mL/min/1.73 77   Albumin 3.8 - 4.8 g/dL 4.6   Phosphorus 3.0 - 4.3 mg/dL 2.8 (L)   PTH Intact (Serial Monitor) 15 - 65 pg/mL 93 (H)   Magnesium 1.6 - 2.3 mg/dL 2.2   25 Hydroxy, Vitamin D 30.0 - 100.0 ng/mL 10.8 (L)     Component Ref Range & Units 2 mo ago    Calcium, Urine Not Estab. mg/dL 15.4    Calcium, Ur, Timed 0 - 320 mg/24 hr 200        7/7/2022 FINDINGS:   LUMBAR SPINE:  The BMD measured in the L1-L4 is 0.795 g/cm2 for a  T-score of -2.3 and a Z-score of -0.2.     LEFT HIP: The BMD for the femoral neck is 0.593 g/cm2 for a T score of   -2.3 and a Z score of -0.5.     RIGHT HIP:  The BMD for the femoral neck is 0.583 g/cm2 for a T score of  -2.4 and a Z score of -0.6.     LEFT FOREARM: The BMD for the forearm 1/3 is 0.532 g/cm2 for a T-score  of -2.7 and a Z-score of -0.6.     IMPRESSION:  Bone mineral density T score is lowest within the left  forearm indicating osteoporosis.    Leny Neumann presents to [facility]  History of Present Illness    72-year-old  female  presents for evaluation and treatment recommendations for hypercalcemia.  She has a juvenile cases .    About 5 years ago she had a bone density evaluation done.  At that time she was medically told to discontinue her supplemental calcium.  No further information was provided to her at that time.  Most recently she had another bone density evaluation done in early July, 2022 and she was told by ancillary staff at that time that her calcium level is elevated.    Her main symptom that has been bothering her has been right upper neck pain for the past 17 months or so she has been evaluated by to ENT physicians and other doctors, however we have not been able to establish a cause for her pain/discomfort.  She has had imaging studies done, without any clear etiology identified for her pain.  She requires regular Tylenol ingestion to relieve herself of the pain.    Patient is known to have an elevated calcium level at least for the past 5 years..    The patient is not on HCTZ or Lithium.    Patient is not on calcium supplementation or vitamin D supplementation.    Patient has been experiencing non-specific symptoms, including fatigue, polyuria/polydipsia.  - denies hematuria, kidney stones. Father had a long standing history of kidney stones.  - fracture- across foot with a fall. Her DXA scan has shown osteopenia and recently osteoporosis  - she occasionally has nausea, but no vomiting. She denies abdominal pain. She has constipation.  - she denies anxiety, depression, cognitive impairment    There is no family history of hypercalcemia, parathyroid disease or any hormone secreting tumors in the family.    Labs:   Latest Reference Range & Units 12/23/21 00:00 12/30/21 00:00   Glucose 65 - 99 mg/dL 91    Calcium 8.7 - 10.3 mg/dL 10.7 (H)    eGFR Non African Am >59 mL/min/1.73 68    eGFR African Am >59 mL/min/1.73 79    PTH Intact (Serial Monitor) 15 - 65 pg/mL  84 (H)   LDL Cholesterol  0 - 99 mg/dL 173 (H)    (H):  "Data is abnormally high     Latest Reference Range & Units 04/19/19 09:27   25 Hydroxy, Vitamin D 30.0 - 100.0 ng/ml 36.2       Component   Ref Range & Units 8 mo ago   (12/23/21) 8 mo ago   (12/9/21) 2 yr ago   (1/31/20) 3 yr ago   (4/19/19) 3 yr ago   (4/2/19) 4 yr ago   (3/21/18)   eGFR Non African Am   >59 mL/min/1.73 68  70 R   49 Low  R  53 Low  R  66 R    Calcium   8.7 - 10.3 mg/dL 10.7 High   10.7 High  R   11.2 High  R  11.0 High  R  10.4 R    Albumin   3.8 - 4.8 g/dL 4.7  4.70 R    4.80 R  4.70 R        Imaging studies:    Bone density evaluation 7/7/2022 FINDINGS:   LUMBAR SPINE:  The BMD measured in the L1-L4 is 0.795 g/cm2 for a  T-score of -2.3 and a Z-score of -0.2.     LEFT HIP: The BMD for the femoral neck is 0.593 g/cm2 for a T score of   -2.3 and a Z score of -0.5.     RIGHT HIP:  The BMD for the femoral neck is 0.583 g/cm2 for a T score of  -2.4 and a Z score of -0.6.     LEFT FOREARM: The BMD for the forearm 1/3 is 0.532 g/cm2 for a T-score  of -2.7 and a Z-score of -0.6.     IMPRESSION:  Bone mineral density T score is lowest within the left  forearm indicating osteoporosis.      Reviewed PMH, Allergies, Medication list, family history and social history.    ROS as per HPI, otherwise negative    Objective   Vital Signs:   /88   Pulse 79   Temp 97.1 °F (36.2 °C) (Temporal)   Ht 152.4 cm (60\")   Wt 77 kg (169 lb 12.8 oz)   SpO2 97%   BMI 33.16 kg/m²     Physical Exam  Vitals and nursing note reviewed.   Constitutional:       General: She is not in acute distress.     Appearance: She is not ill-appearing, toxic-appearing or diaphoretic.   HENT:      Head: Normocephalic and atraumatic.      Comments: Right upper neck 3 cm soft tender lump       Nose: Nose normal.      Mouth/Throat:      Mouth: Mucous membranes are moist.      Pharynx: Oropharynx is clear. No oropharyngeal exudate or posterior oropharyngeal erythema.   Eyes:      General: No scleral icterus.     Extraocular Movements: " Extraocular movements intact.      Conjunctiva/sclera: Conjunctivae normal.      Pupils: Pupils are equal, round, and reactive to light.   Neck:      Thyroid: No thyroid mass, thyromegaly or thyroid tenderness.      Vascular: No carotid bruit.      Trachea: Trachea normal.   Cardiovascular:      Rate and Rhythm: Normal rate and regular rhythm.      Pulses: Normal pulses.      Heart sounds: Normal heart sounds. No murmur heard.    No friction rub. No gallop.   Pulmonary:      Effort: Pulmonary effort is normal. No respiratory distress.      Breath sounds: Normal breath sounds. No stridor. No wheezing, rhonchi or rales.   Chest:      Chest wall: No tenderness.   Abdominal:      General: Bowel sounds are normal. There is no distension.      Palpations: Abdomen is soft. There is no mass.      Tenderness: There is no abdominal tenderness. There is no rebound.   Musculoskeletal:         General: No swelling, tenderness, deformity or signs of injury. Normal range of motion.      Cervical back: Normal range of motion and neck supple. No rigidity or tenderness.      Right lower leg: No edema.      Left lower leg: No edema.   Lymphadenopathy:      Cervical: No cervical adenopathy.   Skin:     General: Skin is warm and dry.      Capillary Refill: Capillary refill takes 2 to 3 seconds.      Coloration: Skin is not jaundiced or pale.      Findings: No bruising, erythema, lesion or rash.      Comments: Skin lesion over right lower cheek  - skin colored 5 mm lesions x2     Neurological:      General: No focal deficit present.      Mental Status: She is alert and oriented to person, place, and time. Mental status is at baseline.      Cranial Nerves: No cranial nerve deficit.      Sensory: No sensory deficit.      Motor: No weakness.      Coordination: Coordination normal.      Gait: Gait normal.      Deep Tendon Reflexes: Reflexes normal.   Psychiatric:         Mood and Affect: Mood normal.         Behavior: Behavior normal.          Thought Content: Thought content normal.         Judgment: Judgment normal.        Result Review :     CMP    CMP 12/9/21 12/23/21 8/31/22   Glucose 96 91 92   BUN 16 21 14   Creatinine 0.81 0.87 0.82   eGFR Non  Am 70 68    eGFR African Am  79    Sodium 140 140 141   Potassium 4.4 5.2 5.0   Chloride 106 102 104   Calcium 10.7 (A) 10.7 (A) 10.8 (A)   Total Protein  6.9 7.2   Albumin 4.70 4.7 4.6   Globulin  2.2 2.6   Total Bilirubin 0.3 0.3 0.2   Alkaline Phosphatase 72 69 67   AST (SGOT) 18 15 20   ALT (SGPT) 25 18 24   (A) Abnormal value       Comments are available for some flowsheets but are not being displayed.                     Assessment and Plan    Diagnoses and all orders for this visit:    1. Hyperparathyroidism (HCC) (Primary)  -     NM Parathyroid Scan w SPECT; Future  -     Ambulatory Referral to ENT (Otolaryngology)  -     Comprehensive Metabolic Panel; Future  -     PTH, Intact; Future  -     Phosphorus; Future  -     Magnesium; Future  -     Calcium, Ionized; Future    2. Hypercalcemia  -     NM Parathyroid Scan w SPECT; Future  -     Comprehensive Metabolic Panel; Future  -     PTH, Intact; Future  -     Phosphorus; Future  -     Magnesium; Future  -     Calcium, Ionized; Future    3. Vitamin D deficiency  -     ergocalciferol (ERGOCALCIFEROL) 1.25 MG (51392 UT) capsule; By mouth take one capsule daily for 5 days loading, then take one capsule once a week for 3 months.  Dispense: 15 capsule; Refill: 3  -     Vitamin D,25-Hydroxy; Future       Biochemical testing is consistent with PHP.  We will proceed with imaging studies i.e. a nuclear medicine parathyroid scan with SPECT-CT and referral to Dr. Cobb from ENT.    As for the patient's right upper neck soft tissue mass and tenderness/pain, no clear etiology has been identified.  The pain has been present for 20 months or longer.  She has had two ENT evaluations and imaging studies of her neck, including a neck ultrasound and CT scan of the  head and neck.  She was scheduled to see a neurosurgeon, possibly for nerve compression evaluation as the etiology of the pain.  We will have Dr. Cobb evaluate this problem as well.    Vitamin D deficient: We will initiate on weekly 50,000 IU vitamin D.      Follow Up   Return in about 3 months (around 2/15/2023) for f-up with labs a week prior to office visit.  Patient was given instructions and counseling regarding her condition or for health maintenance advice. Please see specific information pulled into the AVS if appropriate.

## 2022-12-30 ENCOUNTER — HOSPITAL ENCOUNTER (OUTPATIENT)
Dept: GENERAL RADIOLOGY | Facility: HOSPITAL | Age: 72
Discharge: HOME | End: 2022-12-30

## 2022-12-30 DIAGNOSIS — M54.2 NECK PAIN: ICD-10-CM

## 2022-12-30 PROCEDURE — 72050 X-RAY EXAM NECK SPINE 4/5VWS: CPT

## 2023-02-16 ENCOUNTER — TELEPHONE (OUTPATIENT)
Dept: FAMILY MEDICINE CLINIC | Facility: CLINIC | Age: 73
End: 2023-02-16
Payer: MEDICARE

## 2023-02-16 DIAGNOSIS — E78.2 MIXED HYPERLIPIDEMIA: ICD-10-CM

## 2023-02-16 DIAGNOSIS — Z87.891 PERSONAL HISTORY OF NICOTINE DEPENDENCE: ICD-10-CM

## 2023-02-16 DIAGNOSIS — Z12.31 ENCOUNTER FOR SCREENING MAMMOGRAM FOR HIGH-RISK PATIENT: ICD-10-CM

## 2023-02-16 DIAGNOSIS — Z12.2 SCREENING FOR LUNG CANCER: Primary | ICD-10-CM

## 2023-02-16 RX ORDER — PRAVASTATIN SODIUM 40 MG
40 TABLET ORAL DAILY
Qty: 30 TABLET | Refills: 0 | Status: SHIPPED | OUTPATIENT
Start: 2023-02-16 | End: 2023-03-06 | Stop reason: SDUPTHER

## 2023-02-16 NOTE — TELEPHONE ENCOUNTER
[facility] doesn't offer mobile mammos.    Did you want to order the lung screening on her prior to her appointment with you next month?

## 2023-02-16 NOTE — TELEPHONE ENCOUNTER
Caller: Thien Leny JULIUS    Relationship: Self    Best call back number: [phone]    Requested Prescriptions:   Requested Prescriptions     Pending Prescriptions Disp Refills   • pravastatin (PRAVACHOL) 40 MG tablet 90 tablet 1     Sig: Take 1 tablet by mouth Daily.        Pharmacy where request should be sent: [id number] - [place], [place] - [address] [phone]  - [phone] FX     Additional details provided by patient: PATIENT STATES THAT SHE IS OUT OF THIS MEDICATION.    Does the patient have less than a 3 day supply:  [x] Yes  [] No    Would you like a call back once the refill request has been completed: [] Yes [x] No    If the office needs to give you a call back, can they leave a voicemail: [] Yes [x] No    PLEASE ADVISE.    Naina Cruz Rep   02/16/23 11:11 EST

## 2023-02-16 NOTE — TELEPHONE ENCOUNTER
Caller: Leny Neumann    Relationship: Self    Best call back number: [phone]    What is the best time to reach you: ANY TIME    Who are you requesting to speak with (clinical staff, provider,  specific staff member): CLINICAL STAFF    What was the call regarding: PATIENT WOULD LIKE FOR CHARLIE ROLLINS TO PUT IN ORDER FOR HER TO HAVE MAMMOGRAM DONE AT [facility]BILE UNIT.  SHE ALSO WOULD LIKE A CALLBACK TO DISCUSS LUNG CANER SCREENING.    Do you require a callback: YES    PLEASE ADVISE.

## 2023-03-06 ENCOUNTER — OFFICE VISIT (OUTPATIENT)
Dept: FAMILY MEDICINE CLINIC | Facility: CLINIC | Age: 73
End: 2023-03-06
Payer: MEDICARE

## 2023-03-06 VITALS
SYSTOLIC BLOOD PRESSURE: 164 MMHG | RESPIRATION RATE: 16 BRPM | OXYGEN SATURATION: 97 % | BODY MASS INDEX: 32.39 KG/M2 | WEIGHT: 165 LBS | HEIGHT: 60 IN | DIASTOLIC BLOOD PRESSURE: 82 MMHG | HEART RATE: 91 BPM

## 2023-03-06 DIAGNOSIS — R79.9 ABNORMAL FINDING OF BLOOD CHEMISTRY, UNSPECIFIED: ICD-10-CM

## 2023-03-06 DIAGNOSIS — E78.2 MIXED HYPERLIPIDEMIA: ICD-10-CM

## 2023-03-06 DIAGNOSIS — E55.9 VITAMIN D DEFICIENCY DISEASE: ICD-10-CM

## 2023-03-06 DIAGNOSIS — E21.3 HYPERPARATHYROIDISM: ICD-10-CM

## 2023-03-06 DIAGNOSIS — R53.83 OTHER FATIGUE: ICD-10-CM

## 2023-03-06 DIAGNOSIS — M54.81 OCCIPITAL NEURALGIA OF RIGHT SIDE: ICD-10-CM

## 2023-03-06 DIAGNOSIS — Z00.00 ENCOUNTER FOR SUBSEQUENT ANNUAL WELLNESS VISIT (AWV) IN MEDICARE PATIENT: Primary | ICD-10-CM

## 2023-03-06 DIAGNOSIS — R06.02 SHORTNESS OF BREATH: ICD-10-CM

## 2023-03-06 PROCEDURE — 99213 OFFICE O/P EST LOW 20 MIN: CPT | Performed by: NURSE PRACTITIONER

## 2023-03-06 PROCEDURE — 1170F FXNL STATUS ASSESSED: CPT | Performed by: NURSE PRACTITIONER

## 2023-03-06 PROCEDURE — 93000 ELECTROCARDIOGRAM COMPLETE: CPT | Performed by: NURSE PRACTITIONER

## 2023-03-06 PROCEDURE — 1160F RVW MEDS BY RX/DR IN RCRD: CPT | Performed by: NURSE PRACTITIONER

## 2023-03-06 PROCEDURE — G0439 PPPS, SUBSEQ VISIT: HCPCS | Performed by: NURSE PRACTITIONER

## 2023-03-06 RX ORDER — PRAVASTATIN SODIUM 40 MG
40 TABLET ORAL DAILY
Qty: 90 TABLET | Refills: 1 | Status: SHIPPED | OUTPATIENT
Start: 2023-03-06 | End: 2023-06-04

## 2023-03-06 NOTE — PROGRESS NOTES
Subsequent Medicare Wellness Visit    Subjective    Leny Neumann is a 72 y.o. female who presents for a Subsequent Medicare Wellness Visit.    The following portions of the patient's history were reviewed and   updated as appropriate: allergies, current medications, past family history, past medical history, past social history, past surgical history and problem list.    Compared to one year ago, the patient feels her physical   health is worse.    Compared to one year ago, the patient feels her mental   health is the same.    Recent Hospitalizations:  She was not admitted to the hospital during the last year.       Current Medical Providers:  Patient Care Team:  Shellie Gamez APRN as PCP - General (Family Medicine)  Morgan Mark MD as Consulting Physician (Otolaryngology)    Outpatient Medications Prior to Visit   Medication Sig Dispense Refill   • omeprazole (priLOSEC) 40 MG capsule Take 20 mg by mouth As Needed.  3   • pravastatin (PRAVACHOL) 40 MG tablet Take 1 tablet by mouth Daily. 30 tablet 0   • ergocalciferol (ERGOCALCIFEROL) 1.25 MG (89084 UT) capsule By mouth take one capsule daily for 5 days loading, then take one capsule once a week for 3 months. 15 capsule 3     No facility-administered medications prior to visit.       No opioid medication identified on active medication list. I have reviewed chart for other potential  high risk medication/s and harmful drug interactions in the elderly.          Aspirin is not on active medication list.  Aspirin use is not indicated based on review of current medical condition/s. Risk of harm outweighs potential benefits.  .    Patient Active Problem List   Diagnosis   • Hyperlipidemia   • Osteopenia   • Colon polyp   • Allergic   • Hypothyroidism   • Tinnitus of both ears   • Onychomycosis   • Other fatigue   • Tinea pedis   • Menopausal hot flushes   • Screening for colon cancer   • Occipital neuralgia of right side   • Numbness and tingling of  "both feet   • New onset of headaches after age 50     Advance Care Planning  Advance Directive is not on file.  ACP discussion was held with the patient during this visit. Patient does not have an advance directive, information provided.     Objective    Vitals:    23 1342   BP: 164/82   Pulse: 91   Resp: 16   SpO2: 97%   Weight: 74.8 kg (165 lb)   Height: 152.4 cm (60\")     Estimated body mass index is 32.22 kg/m² as calculated from the following:    Height as of this encounter: 152.4 cm (60\").    Weight as of this encounter: 74.8 kg (165 lb).    BMI is >= 30 and <35. (Class 1 Obesity). The following options were offered after discussion;: weight loss educational material (shared in after visit summary), exercise counseling/recommendations and nutrition counseling/recommendations      Does the patient have evidence of cognitive impairment? No          HEALTH RISK ASSESSMENT    Smoking Status:  Social History     Tobacco Use   Smoking Status Former   • Packs/day: 1.00   • Years: 30.00   • Pack years: 30.00   • Types: Cigarettes   • Start date: 1965   • Quit date: 2001   • Years since quittin.4   Smokeless Tobacco Never   Tobacco Comments    smoked 1 ppd for 20 yr, quit in , then relapsed for a year in      Alcohol Consumption:  Social History     Substance and Sexual Activity   Alcohol Use Not Currently    Comment: Rarely     Fall Risk Screen:    STEADI Fall Risk Assessment was completed, and patient is at LOW risk for falls.Assessment completed on:3/6/2023    Depression Screening:  PHQ-2/PHQ-9 Depression Screening 3/6/2023   Little Interest or Pleasure in Doing Things 0-->not at all   Feeling Down, Depressed or Hopeless 0-->not at all   PHQ-9: Brief Depression Severity Measure Score 0       Health Habits and Functional and Cognitive Screening:  Functional & Cognitive Status 3/6/2023   Do you have difficulty preparing food and eating? No   Do you have difficulty bathing yourself, getting " dressed or grooming yourself? No   Do you have difficulty using the toilet? No   Do you have difficulty moving around from place to place? No   Do you have trouble with steps or getting out of a bed or a chair? No   Current Diet Well Balanced Diet   Dental Exam Up to date   Eye Exam Not up to date   Exercise (times per week) 7 times per week   Current Exercises Include Walking   Current Exercise Activities Include -   Do you need help using the phone?  No   Are you deaf or do you have serious difficulty hearing?  No   Do you need help with transportation? No   Do you need help shopping? No   Do you need help preparing meals?  No   Do you need help with housework?  No   Do you need help with laundry? No   Do you need help taking your medications? No   Do you need help managing money? No   Do you ever drive or ride in a car without wearing a seat belt? No   Have you felt unusual stress, anger or loneliness in the last month? No   Who do you live with? Alone   If you need help, do you have trouble finding someone available to you? No   Have you been bothered in the last four weeks by sexual problems? No   Do you have difficulty concentrating, remembering or making decisions? No       Age-appropriate Screening Schedule:  Refer to the list below for future screening recommendations based on patient's age, sex and/or medical conditions. Orders for these recommended tests are listed in the plan section. The patient has been provided with a written plan.    Health Maintenance   Topic Date Due   • LUNG CANCER SCREENING  Never done   • PAP SMEAR  03/21/2021   • MAMMOGRAM  04/22/2022   • INFLUENZA VACCINE  08/01/2022   • TDAP/TD VACCINES (3 - Td or Tdap) 08/19/2022   • LIPID PANEL  12/23/2022   • ANNUAL WELLNESS VISIT  12/30/2022   • COLORECTAL CANCER SCREENING  08/16/2023   • DXA SCAN  07/07/2024   • HEPATITIS C SCREENING  Completed   • COVID-19 Vaccine  Completed   • Pneumococcal Vaccine 65+  Completed   • ZOSTER VACCINE   Completed                CMS Preventative Services Quick Reference  Risk Factors Identified During Encounter  Fall Risk-High or Moderate: Discussed Fall Prevention in the home  Inactivity/Sedentary: Patient was advised to exercise at least 150 minutes a week per CDC recommendations.  Dental Screening Recommended  Vision Screening Recommended  The above risks/problems have been discussed with the patient.  Pertinent information has been shared with the patient in the After Visit Summary.  An After Visit Summary and PPPS were made available to the patient.    Follow Up:   Next Medicare Wellness visit to be scheduled in 1 year.       Additional E&M Note during same encounter follows:  Patient has multiple medical problems which are significant and separately identifiable that require additional work above and beyond the Medicare Wellness Visit.      Chief Complaint  Annual Exam, Weight Gain, and Shortness of Breath    Subjective        HPI  Leny Neumann is also being seen today for fatigue, SOA and heart pounding with exertion when walking dog.  She has newly diagnosed hyperparathyroidism.  She is concerned about Incidental finding on parathyroid CT: 4 mm sub-solid nodule at the anterior aspect of the right lung apex. H.o smoking 30 years , quit 2000 and relapsed x 1 year in 2011. She has upcoming parathyroidectomy in May and needs surgical clearance.  She is seeking cardio referral.  She has previously ordered low-dose CT for lung cancer screening which she has not scheduled.  She has upcoming mammogram scheduled.    Chronic hyperlipidemia times years.  She is managed on pravastatin 40 mg.  She needs refill today.  BMI 32.  Patient is working on weight loss and asking for information on [employer] meal replacement program.  She has had luck in the past with [facility].      Review of Systems   Constitutional: Positive for fatigue.   HENT: Negative for congestion.    Eyes: Negative for visual disturbance.  "  Respiratory: Positive for shortness of breath. Negative for cough and wheezing.    Cardiovascular: Negative for chest pain, palpitations and leg swelling.   Gastrointestinal: Negative for constipation, diarrhea and nausea.   Endocrine: Negative for polyuria.   Genitourinary: Negative for dysuria.   Musculoskeletal: Negative for arthralgias and back pain.   Skin: Negative for wound.   Allergic/Immunologic: Negative for environmental allergies.   Hematological: Does not bruise/bleed easily.   Psychiatric/Behavioral: Negative for decreased concentration. The patient is not nervous/anxious.        Objective   Vital Signs:  /82   Pulse 91   Resp 16   Ht 152.4 cm (60\")   Wt 74.8 kg (165 lb)   SpO2 97%   BMI 32.22 kg/m²     Physical Exam  Vitals reviewed.   Constitutional:       Appearance: Normal appearance. She is normal weight.   HENT:      Head: Normocephalic.      Right Ear: Tympanic membrane normal.      Left Ear: Tympanic membrane normal.      Nose: Nose normal.      Mouth/Throat:      Mouth: Mucous membranes are moist.   Eyes:      Pupils: Pupils are equal, round, and reactive to light.   Cardiovascular:      Rate and Rhythm: Normal rate and regular rhythm.      Pulses: Normal pulses.      Heart sounds: Normal heart sounds.   Pulmonary:      Effort: Pulmonary effort is normal.      Breath sounds: Normal breath sounds.   Abdominal:      General: Abdomen is flat. Bowel sounds are normal.      Palpations: Abdomen is soft.   Musculoskeletal:         General: Normal range of motion.      Cervical back: Normal range of motion.   Skin:     General: Skin is warm.   Neurological:      General: No focal deficit present.      Mental Status: She is alert.   Psychiatric:         Mood and Affect: Mood normal.                         Assessment and Plan   Diagnoses and all orders for this visit:    1. Encounter for subsequent annual wellness visit (AWV) in Medicare patient (Primary)    2. Hyperparathyroidism " ([facility])  -     Comprehensive Metabolic Panel  -     Lipid panel  -     PTH, Intact  -     Vitamin D 25 hydroxy  -     Calcium, Ionized  -     Magnesium  -     Phosphorus    3. Shortness of breath  -     ECG 12 Lead  -     Ambulatory Referral to Cardiology    4. Mixed hyperlipidemia  -     pravastatin (PRAVACHOL) 40 MG tablet; Take 1 tablet by mouth Daily for 90 days.  Dispense: 90 tablet; Refill: 1    5. Vitamin D deficiency disease    6. Occipital neuralgia of right side    7. Other fatigue  -     Ambulatory Referral to Cardiology    8. Abnormal finding of blood chemistry, unspecified  -     Lipid panel        Preventative care- Follow heart healthy diet, drink water, walk daily. Wear seatbelts, wear helmets, wear sunscreens. Follow CDC guidelines for covid pandemic.   Encourage heart healthy diet, daily walking, refer to cardio.  EKG is normal today, reviewed w dr Skaggs NSR    Encourage patient to schedule low-dose lung CT     additional time on acute concnerns > 25 min    Follow Up   No follow-ups on file.  Patient was given instructions and counseling regarding her condition or for health maintenance advice. Please see specific information pulled into the AVS if appropriate.

## 2023-03-07 LAB
25(OH)D3+25(OH)D2 SERPL-MCNC: 78.5 NG/ML (ref 30–100)
ALBUMIN SERPL-MCNC: 4.9 G/DL (ref 3.8–4.8)
ALBUMIN/GLOB SERPL: 2 {RATIO} (ref 1.2–2.2)
ALP SERPL-CCNC: 77 IU/L (ref 44–121)
ALT SERPL-CCNC: 21 IU/L (ref 0–32)
AST SERPL-CCNC: 18 IU/L (ref 0–40)
BILIRUB SERPL-MCNC: 0.3 MG/DL (ref 0–1.2)
BUN SERPL-MCNC: 15 MG/DL (ref 8–27)
BUN/CREAT SERPL: 16 (ref 12–28)
CA-I SERPL ISE-MCNC: 5.6 MG/DL (ref 4.5–5.6)
CALCIUM SERPL-MCNC: 10.9 MG/DL (ref 8.7–10.3)
CHLORIDE SERPL-SCNC: 106 MMOL/L (ref 96–106)
CHOLEST SERPL-MCNC: 267 MG/DL (ref 100–199)
CO2 SERPL-SCNC: 26 MMOL/L (ref 20–29)
CREAT SERPL-MCNC: 0.96 MG/DL (ref 0.57–1)
EGFRCR SERPLBLD CKD-EPI 2021: 64 ML/MIN/1.73
GLOBULIN SER CALC-MCNC: 2.5 G/DL (ref 1.5–4.5)
GLUCOSE SERPL-MCNC: 92 MG/DL (ref 70–99)
HDLC SERPL-MCNC: 43 MG/DL
LDLC SERPL CALC-MCNC: 182 MG/DL (ref 0–99)
MAGNESIUM SERPL-MCNC: 2.2 MG/DL (ref 1.6–2.3)
PHOSPHATE SERPL-MCNC: 3.2 MG/DL (ref 3–4.3)
POTASSIUM SERPL-SCNC: 4.8 MMOL/L (ref 3.5–5.2)
PROT SERPL-MCNC: 7.4 G/DL (ref 6–8.5)
PTH-INTACT SERPL-MCNC: 108 PG/ML (ref 15–65)
SODIUM SERPL-SCNC: 145 MMOL/L (ref 134–144)
TRIGL SERPL-MCNC: 223 MG/DL (ref 0–149)
VLDLC SERPL CALC-MCNC: 42 MG/DL (ref 5–40)

## 2023-03-20 ENCOUNTER — OFFICE VISIT (OUTPATIENT)
Dept: CARDIOLOGY | Facility: CLINIC | Age: 73
End: 2023-03-20
Payer: MEDICARE

## 2023-03-20 VITALS
HEIGHT: 60 IN | DIASTOLIC BLOOD PRESSURE: 80 MMHG | HEART RATE: 77 BPM | BODY MASS INDEX: 32.59 KG/M2 | RESPIRATION RATE: 18 BRPM | WEIGHT: 166 LBS | SYSTOLIC BLOOD PRESSURE: 116 MMHG | OXYGEN SATURATION: 98 %

## 2023-03-20 DIAGNOSIS — E78.5 HYPERLIPIDEMIA LDL GOAL <100: ICD-10-CM

## 2023-03-20 DIAGNOSIS — R06.09 DOE (DYSPNEA ON EXERTION): Primary | ICD-10-CM

## 2023-03-20 PROCEDURE — 99204 OFFICE O/P NEW MOD 45 MIN: CPT | Performed by: INTERNAL MEDICINE

## 2023-03-20 PROCEDURE — 93000 ELECTROCARDIOGRAM COMPLETE: CPT | Performed by: INTERNAL MEDICINE

## 2023-03-20 NOTE — PROGRESS NOTES
"      CARDIOLOGY    Sharon Parker MD    ENCOUNTER DATE:  03/20/2023    Leny Neumann / 72 y.o. / female        CHIEF COMPLAINT / REASON FOR OFFICE VISIT     Heart Problem (Shortness of Breath & Fatigue )      HISTORY OF PRESENT ILLNESS       HPI    Leny Neumann is a 72 y.o. female     She has a history of hyperlipidemia but has always been active and physical her whole life and felt well. She has noticed that she has some shortness of breath when she is trying to exert herself. She has fatigue. She does not feel like she has the energy to do her usual routine. She has an occasional chest pain.         REVIEW OF SYSTEMS     Review of Systems   Constitutional: Negative for chills, fever, weight gain and weight loss.   Cardiovascular: Negative for leg swelling.   Respiratory: Negative for cough, snoring and wheezing.    Hematologic/Lymphatic: Negative for bleeding problem. Does not bruise/bleed easily.   Skin: Negative for color change.   Musculoskeletal: Negative for falls, joint pain and myalgias.   Gastrointestinal: Negative for melena.   Genitourinary: Negative for hematuria.   Neurological: Negative for excessive daytime sleepiness.   Psychiatric/Behavioral: Negative for depression. The patient is not nervous/anxious.          VITAL SIGNS     Visit Vitals  /80 (BP Location: Right arm, Patient Position: Sitting, Cuff Size: Adult)   Pulse 77   Resp 18   Ht 152.4 cm (60\")   Wt 75.3 kg (166 lb)   SpO2 98%   BMI 32.42 kg/m²         Wt Readings from Last 3 Encounters:   03/20/23 75.3 kg (166 lb)   03/06/23 74.8 kg (165 lb)   11/15/22 77 kg (169 lb 12.8 oz)     Body mass index is 32.42 kg/m².      PHYSICAL EXAMINATION     Constitutional:       General: Not in acute distress.  Neck:      Vascular: No carotid bruit or JVD.   Pulmonary:      Effort: Pulmonary effort is normal.      Breath sounds: Normal breath sounds.   Cardiovascular:      Normal rate. Regular rhythm.      Murmurs: There is no murmur. "   Psychiatric:         Mood and Affect: Mood and affect normal.           REVIEWED DATA       ECG 12 Lead    Date/Time: 3/20/2023 11:22 AM  Performed by: Sharon Parker MD  Authorized by: Sharon Parker MD   Previous ECG: no previous ECG available  Rhythm: sinus rhythm  BPM: 77  Conduction: conduction normal  ST Segments: ST segments normal  Other: no other findings    Clinical impression: normal ECG              Lipid Panel      Lipid Panel 3/6/23   Total Cholesterol 267 (A)   Triglycerides 223 (A)   HDL Cholesterol 43   VLDL Cholesterol 42 (A)   LDL Cholesterol  182 (A)   (A) Abnormal value                Lab Results   Component Value Date    GLUCOSE 92 03/06/2023    BUN 15 03/06/2023    CREATININE 0.96 03/06/2023    EGFRRESULT 64 03/06/2023    BCR 16 03/06/2023    K 4.8 03/06/2023    CO2 26 03/06/2023    CALCIUM 10.9 (H) 03/06/2023    PROTENTOTREF 7.4 03/06/2023    ALBUMIN 4.9 (H) 03/06/2023    BILITOT 0.3 03/06/2023    AST 18 03/06/2023    ALT 21 03/06/2023       ASSESSMENT & PLAN      Diagnosis Plan   1. ORTEGA (dyspnea on exertion)  Adult Stress Echo W/ Cont or Stress Agent if Necessary Per Protocol      2. Hyperlipidemia LDL goal <100  Adult Stress Echo W/ Cont or Stress Agent if Necessary Per Protocol        1. Dyspnea on exertion and fatigue. I recommend checking a stress echo to exclude any signs of ischemia or heart disease.   2. Hyperlipidemia on statin therapy.   3. Parathyroid disease. Plan is for surgery in May of 2023.     One of my nurses or I will go over the results of her stress test when it is available.               Orders Placed This Encounter   Procedures    ECG 12 Lead     This order was created via procedure documentation     Order Specific Question:   Release to patient     Answer:   Routine Release    Adult Stress Echo W/ Cont or Stress Agent if Necessary Per Protocol     Standing Status:   Future     Standing Expiration Date:   3/19/2024     Order Specific Question:   What stress  agent will be used?     Answer:   Exercise with [employer]     Order Specific Question:   Reason for exam?     Answer:   Dyspnea     Order Specific Question:   Release to patient     Answer:   Routine Release           MEDICATIONS         Discharge Medications            Accurate as of March 20, 2023 11:23 AM. If you have any questions, ask your nurse or doctor.                Continue These Medications        Instructions Start Date   omeprazole 40 MG capsule  Commonly known as: priLOSEC   20 mg, Oral, As Needed      pravastatin 40 MG tablet  Commonly known as: PRAVACHOL   40 mg, Oral, Daily                 Sharon Parker MD  03/20/23  11:23 EDT    Part of this note may be an electronic transcription/translation of spoken language to printed text using the Dragon dictation system.

## 2023-04-06 ENCOUNTER — HOSPITAL ENCOUNTER (OUTPATIENT)
Dept: CT IMAGING | Facility: HOSPITAL | Age: 73
Discharge: HOME | End: 2023-04-06
Payer: MEDICARE

## 2023-04-06 ENCOUNTER — HOSPITAL ENCOUNTER (OUTPATIENT)
Dept: MAMMOGRAPHY | Facility: HOSPITAL | Age: 73
Discharge: HOME | End: 2023-04-06
Payer: MEDICARE

## 2023-04-06 DIAGNOSIS — Z12.2 SCREENING FOR LUNG CANCER: ICD-10-CM

## 2023-04-06 DIAGNOSIS — Z12.31 ENCOUNTER FOR SCREENING MAMMOGRAM FOR HIGH-RISK PATIENT: ICD-10-CM

## 2023-04-06 DIAGNOSIS — Z87.891 PERSONAL HISTORY OF NICOTINE DEPENDENCE: ICD-10-CM

## 2023-04-06 PROCEDURE — 77067 SCR MAMMO BI INCL CAD: CPT

## 2023-04-06 PROCEDURE — 77063 BREAST TOMOSYNTHESIS BI: CPT

## 2023-04-06 PROCEDURE — 71271 CT THORAX LUNG CANCER SCR C-: CPT

## 2023-04-07 ENCOUNTER — TELEPHONE (OUTPATIENT)
Dept: CARDIOLOGY | Facility: CLINIC | Age: 73
End: 2023-04-07
Payer: MEDICARE

## 2023-04-07 ENCOUNTER — HOSPITAL ENCOUNTER (OUTPATIENT)
Dept: CARDIOLOGY | Facility: HOSPITAL | Age: 73
Discharge: HOME | End: 2023-04-07
Admitting: INTERNAL MEDICINE
Payer: MEDICARE

## 2023-04-07 VITALS
OXYGEN SATURATION: 96 % | SYSTOLIC BLOOD PRESSURE: 170 MMHG | WEIGHT: 166 LBS | BODY MASS INDEX: 32.59 KG/M2 | DIASTOLIC BLOOD PRESSURE: 90 MMHG | HEART RATE: 79 BPM | HEIGHT: 60 IN

## 2023-04-07 DIAGNOSIS — E78.5 HYPERLIPIDEMIA LDL GOAL <100: ICD-10-CM

## 2023-04-07 DIAGNOSIS — R06.09 DOE (DYSPNEA ON EXERTION): ICD-10-CM

## 2023-04-07 LAB
AORTIC ARCH: 2.3 CM
ASCENDING AORTA: 2.6 CM
BH CV ECHO MEAS - ACS: 1.87 CM
BH CV ECHO MEAS - AO MAX PG: 7.1 MMHG
BH CV ECHO MEAS - AO MEAN PG: 4.2 MMHG
BH CV ECHO MEAS - AO ROOT DIAM: 2.6 CM
BH CV ECHO MEAS - AO V2 MAX: 133.6 CM/SEC
BH CV ECHO MEAS - AO V2 VTI: 29.7 CM
BH CV ECHO MEAS - AVA(I,D): 1.59 CM2
BH CV ECHO MEAS - EDV(CUBED): 68.1 ML
BH CV ECHO MEAS - EDV(MOD-SP2): 68 ML
BH CV ECHO MEAS - EDV(MOD-SP4): 81 ML
BH CV ECHO MEAS - EF(MOD-BP): 68.2 %
BH CV ECHO MEAS - EF(MOD-SP2): 61.8 %
BH CV ECHO MEAS - EF(MOD-SP4): 75.3 %
BH CV ECHO MEAS - ESV(CUBED): 12.9 ML
BH CV ECHO MEAS - ESV(MOD-SP2): 26 ML
BH CV ECHO MEAS - ESV(MOD-SP4): 20 ML
BH CV ECHO MEAS - FS: 42.6 %
BH CV ECHO MEAS - IVS/LVPW: 0.98 CM
BH CV ECHO MEAS - IVSD: 0.99 CM
BH CV ECHO MEAS - LAT PEAK E' VEL: 4.5 CM/SEC
BH CV ECHO MEAS - LV MASS(C)D: 131.7 GRAMS
BH CV ECHO MEAS - LV MAX PG: 3.8 MMHG
BH CV ECHO MEAS - LV MEAN PG: 2.02 MMHG
BH CV ECHO MEAS - LV V1 MAX: 97.9 CM/SEC
BH CV ECHO MEAS - LV V1 VTI: 19.4 CM
BH CV ECHO MEAS - LVIDD: 4.1 CM
BH CV ECHO MEAS - LVIDS: 2.34 CM
BH CV ECHO MEAS - LVOT AREA: 2.42 CM2
BH CV ECHO MEAS - LVOT DIAM: 1.76 CM
BH CV ECHO MEAS - LVPWD: 1.01 CM
BH CV ECHO MEAS - MED PEAK E' VEL: 5.2 CM/SEC
BH CV ECHO MEAS - MV A DUR: 0.09 SEC
BH CV ECHO MEAS - MV A MAX VEL: 112.1 CM/SEC
BH CV ECHO MEAS - MV DEC SLOPE: 405.5 CM/SEC2
BH CV ECHO MEAS - MV DEC TIME: 0.17 MSEC
BH CV ECHO MEAS - MV E MAX VEL: 78.2 CM/SEC
BH CV ECHO MEAS - MV E/A: 0.7
BH CV ECHO MEAS - MV MAX PG: 4.5 MMHG
BH CV ECHO MEAS - MV MEAN PG: 1.8 MMHG
BH CV ECHO MEAS - MV P1/2T: 58 MSEC
BH CV ECHO MEAS - MV V2 VTI: 24.6 CM
BH CV ECHO MEAS - MVA(P1/2T): 3.8 CM2
BH CV ECHO MEAS - MVA(VTI): 1.92 CM2
BH CV ECHO MEAS - PA ACC TIME: 0.11 SEC
BH CV ECHO MEAS - PA PR(ACCEL): 30.3 MMHG
BH CV ECHO MEAS - PA V2 MAX: 101.8 CM/SEC
BH CV ECHO MEAS - PULM A REVS DUR: 0.1 SEC
BH CV ECHO MEAS - PULM A REVS VEL: 35.4 CM/SEC
BH CV ECHO MEAS - PULM DIAS VEL: 34.9 CM/SEC
BH CV ECHO MEAS - PULM S/D: 1.4
BH CV ECHO MEAS - PULM SYS VEL: 49 CM/SEC
BH CV ECHO MEAS - QP/QS: 1.05
BH CV ECHO MEAS - RAP SYSTOLE: 3 MMHG
BH CV ECHO MEAS - RV MAX PG: 3.3 MMHG
BH CV ECHO MEAS - RV V1 MAX: 90.8 CM/SEC
BH CV ECHO MEAS - RV V1 VTI: 18.2 CM
BH CV ECHO MEAS - RVOT DIAM: 1.86 CM
BH CV ECHO MEAS - RVSP: 15 MMHG
BH CV ECHO MEAS - SUP REN AO DIAM: 1.6 CM
BH CV ECHO MEAS - SV(LVOT): 47.1 ML
BH CV ECHO MEAS - SV(MOD-SP2): 42 ML
BH CV ECHO MEAS - SV(MOD-SP4): 61 ML
BH CV ECHO MEAS - SV(RVOT): 49.5 ML
BH CV ECHO MEAS - TAPSE (>1.6): 2.12 CM
BH CV ECHO MEAS - TR MAX PG: 12.6 MMHG
BH CV ECHO MEAS - TR MAX VEL: 177.8 CM/SEC
BH CV ECHO MEASUREMENTS AVERAGE E/E' RATIO: 16.12
BH CV STRESS BP STAGE 1: NORMAL
BH CV STRESS DURATION MIN STAGE 1: 3
BH CV STRESS DURATION MIN STAGE 2: 2
BH CV STRESS DURATION SEC STAGE 1: 0
BH CV STRESS DURATION SEC STAGE 2: 0
BH CV STRESS ECHO POST STRESS EJECTION FRACTION EF: 80 %
BH CV STRESS GRADE STAGE 1: 10
BH CV STRESS GRADE STAGE 2: 12
BH CV STRESS HR STAGE 1: 115
BH CV STRESS HR STAGE 2: 132
BH CV STRESS METS STAGE 1: 5
BH CV STRESS METS STAGE 2: 7
BH CV STRESS PROTOCOL 1: NORMAL
BH CV STRESS RECOVERY BP: NORMAL MMHG
BH CV STRESS RECOVERY HR: 94 BPM
BH CV STRESS SPEED STAGE 1: 1.7
BH CV STRESS SPEED STAGE 2: 2.5
BH CV STRESS STAGE 1: 1
BH CV STRESS STAGE 2: 2
BH CV XLRA - RV BASE: 2.04 CM
BH CV XLRA - RV LENGTH: 5.7 CM
BH CV XLRA - RV MID: 2.16 CM
BH CV XLRA - TDI S': 12.6 CM/SEC
LEFT ATRIUM VOLUME INDEX: 16.6 ML/M2
MAXIMAL PREDICTED HEART RATE: 150 BPM
PERCENT MAX PREDICTED HR: 88 %
SINUS: 2.31 CM
STJ: 2.46 CM
STRESS BASELINE BP: NORMAL MMHG
STRESS BASELINE HR: 79 BPM
STRESS PERCENT HR: 104 %
STRESS POST ESTIMATED WORKLOAD: 7 METS
STRESS POST EXERCISE DUR MIN: 5 MIN
STRESS POST EXERCISE DUR SEC: 0 SEC
STRESS POST PEAK BP: NORMAL MMHG
STRESS POST PEAK HR: 132 BPM
STRESS TARGET HR: 128 BPM

## 2023-04-07 PROCEDURE — 93018 CV STRESS TEST I&R ONLY: CPT | Performed by: INTERNAL MEDICINE

## 2023-04-07 PROCEDURE — 93320 DOPPLER ECHO COMPLETE: CPT

## 2023-04-07 PROCEDURE — 93017 CV STRESS TEST TRACING ONLY: CPT

## 2023-04-07 PROCEDURE — 93325 DOPPLER ECHO COLOR FLOW MAPG: CPT

## 2023-04-07 PROCEDURE — 93016 CV STRESS TEST SUPVJ ONLY: CPT | Performed by: INTERNAL MEDICINE

## 2023-04-07 PROCEDURE — 93352 ADMIN ECG CONTRAST AGENT: CPT | Performed by: INTERNAL MEDICINE

## 2023-04-07 PROCEDURE — 93320 DOPPLER ECHO COMPLETE: CPT | Performed by: INTERNAL MEDICINE

## 2023-04-07 PROCEDURE — 25510000001 PERFLUTREN (DEFINITY) 8.476 MG IN SODIUM CHLORIDE (PF) 0.9 % 10 ML INJECTION: Performed by: INTERNAL MEDICINE

## 2023-04-07 PROCEDURE — 93325 DOPPLER ECHO COLOR FLOW MAPG: CPT | Performed by: INTERNAL MEDICINE

## 2023-04-07 PROCEDURE — 93350 STRESS TTE ONLY: CPT | Performed by: INTERNAL MEDICINE

## 2023-04-07 PROCEDURE — 93350 STRESS TTE ONLY: CPT

## 2023-04-07 RX ADMIN — PERFLUTREN 3 ML: 6.52 INJECTION, SUSPENSION INTRAVENOUS at 11:04

## 2023-04-07 NOTE — TELEPHONE ENCOUNTER
Please let her know that her stress echo looked good.  Normal heart function.  No evidence of any blocked arteries.  A little stiffening of the heart muscle which is pretty typical for age.  Monitor sodium intake, good blood pressure control.  Call back if symptoms change or get worse.  Otherwise follow-up with her primary provider

## 2023-04-10 DIAGNOSIS — N28.89 RENAL MASS, LEFT: Primary | ICD-10-CM

## 2023-04-10 NOTE — TELEPHONE ENCOUNTER
Notified patient of results/recommendations. Patient verbalized understanding.    Polina Marie RN  Triage [facility] [place]

## 2023-05-01 ENCOUNTER — PRE-ADMISSION TESTING (OUTPATIENT)
Dept: PREADMISSION TESTING | Facility: HOSPITAL | Age: 73
End: 2023-05-01
Payer: MEDICARE

## 2023-05-01 VITALS
WEIGHT: 167 LBS | HEART RATE: 78 BPM | DIASTOLIC BLOOD PRESSURE: 80 MMHG | RESPIRATION RATE: 16 BRPM | SYSTOLIC BLOOD PRESSURE: 163 MMHG | TEMPERATURE: 97.9 F | HEIGHT: 60 IN | OXYGEN SATURATION: 96 % | BODY MASS INDEX: 32.79 KG/M2

## 2023-05-01 LAB
ANION GAP SERPL CALCULATED.3IONS-SCNC: 8.8 MMOL/L (ref 5–15)
BUN SERPL-MCNC: 18 MG/DL (ref 8–23)
BUN/CREAT SERPL: 21.2 (ref 7–25)
CALCIUM SPEC-SCNC: 11.2 MG/DL (ref 8.6–10.5)
CHLORIDE SERPL-SCNC: 111 MMOL/L (ref 98–107)
CO2 SERPL-SCNC: 25.2 MMOL/L (ref 22–29)
CREAT SERPL-MCNC: 0.85 MG/DL (ref 0.57–1)
DEPRECATED RDW RBC AUTO: 46.3 FL (ref 37–54)
EGFRCR SERPLBLD CKD-EPI 2021: 73.8 ML/MIN/1.73
ERYTHROCYTE [DISTWIDTH] IN BLOOD BY AUTOMATED COUNT: 13.6 % (ref 12.3–15.4)
GLUCOSE SERPL-MCNC: 92 MG/DL (ref 65–99)
HCT VFR BLD AUTO: 43 % (ref 34–46.6)
HGB BLD-MCNC: 13.7 G/DL (ref 12–15.9)
MCH RBC QN AUTO: 29.1 PG (ref 26.6–33)
MCHC RBC AUTO-ENTMCNC: 31.9 G/DL (ref 31.5–35.7)
MCV RBC AUTO: 91.5 FL (ref 79–97)
PLATELET # BLD AUTO: 236 10*3/MM3 (ref 140–450)
PMV BLD AUTO: 9.8 FL (ref 6–12)
POTASSIUM SERPL-SCNC: 4.5 MMOL/L (ref 3.5–5.2)
RBC # BLD AUTO: 4.7 10*6/MM3 (ref 3.77–5.28)
SODIUM SERPL-SCNC: 145 MMOL/L (ref 136–145)
WBC NRBC COR # BLD: 7.76 10*3/MM3 (ref 3.4–10.8)

## 2023-05-01 PROCEDURE — 36415 COLL VENOUS BLD VENIPUNCTURE: CPT

## 2023-05-01 PROCEDURE — 85027 COMPLETE CBC AUTOMATED: CPT

## 2023-05-01 PROCEDURE — 80048 BASIC METABOLIC PNL TOTAL CA: CPT

## 2023-05-01 RX ORDER — ACETAMINOPHEN 500 MG
500 TABLET ORAL EVERY 6 HOURS PRN
COMMUNITY

## 2023-05-01 NOTE — DISCHARGE INSTRUCTIONS
Take the following medications the morning of surgery with a small sip of water:  NONE    TIME ARRIVAL 5:30    If you are on prescription narcotic pain medication to control your pain you may also take that medication the morning of surgery.    General Instructions:  Do not eat or drink anything after midnight the night before surgery.  Infants may have breast milk up to four hours before surgery.  Infants drinking formula may drink formula up to six hours before surgery.   Patients who avoid smoking, chewing tobacco and alcohol for 4 weeks prior to surgery have a reduced risk of post-operative complications.  Quit smoking as many days before surgery as you can.  Do not smoke, use chewing tobacco or drink alcohol the day of surgery.   If applicable bring your C-PAP/ BI-PAP machine in with you to preop day of surgery.  Bring any papers given to you in the doctor’s office.  Wear clean comfortable clothes.  Do not wear contact lenses, false eyelashes or make-up.  Bring a case for your glasses.   Bring crutches or walker if applicable.  Remove all piercings.  Leave jewelry and any other valuables at home.  Hair extensions with metal clips must be removed prior to surgery.  The Pre-Admission Testing nurse will instruct you to bring medications if unable to obtain an accurate list in Pre-Admission Testing.            Preventing a Surgical Site Infection:  For 2 to 3 days before surgery, avoid shaving with a razor because the razor can irritate skin and make it easier to develop an infection.    Any areas of open skin can increase the risk of a post-operative wound infection by allowing bacteria to enter and travel throughout the body.  Notify your surgeon if you have any skin wounds / rashes even if it is not near the expected surgical site.  The area will need assessed to determine if surgery should be delayed until it is healed.  The night prior to surgery shower using a fresh bar of anti-bacterial soap (such as Dial)  and clean washcloth.  Sleep in a clean bed with clean clothing.  Do not allow pets to sleep with you.  Shower on the morning of surgery using a fresh bar of anti-bacterial soap (such as Dial) and clean washcloth.  Dry with a clean towel and dress in clean clothing.  Ask your surgeon if you will be receiving antibiotics prior to surgery.  Make sure you, your family, and all healthcare providers clean their hands with soap and water or an alcohol based hand  before caring for you or your wound.    Day of surgery:  Your arrival time is approximately two hours before your scheduled surgery time.  Upon arrival, a Pre-op nurse and Anesthesiologist will review your health history, obtain vital signs, and answer questions you may have.  The only belongings needed at this time will be your home medications and if applicable your C-PAP/BI-PAP machine.  A Pre-op nurse will start an IV and you may receive medication in preparation for surgery, including something to help you relax.      Please be aware that surgery does come with discomfort.  We want to make every effort to control your discomfort so please discuss any uncontrolled symptoms with your nurse.   Your doctor will most likely have prescribed pain medications.      If you are going home after surgery you will receive individualized written care instructions before being discharged.  A responsible adult must drive you to and from the hospital on the day of your surgery and stay with you for 24 hours.  Discharge prescriptions can be filled by the hospital pharmacy during regular pharmacy hours.  If you are having surgery late in the day/evening your prescription may be e-prescribed to your pharmacy.  Please verify your pharmacy hours or chose a 24 hour pharmacy to avoid not having access to your prescription because your pharmacy has closed for the day.    If you are staying overnight following surgery, you will be transported to your hospital room following  the recovery period.  [facility] has all private rooms.    If you have any questions please call Pre-Admission Testing at [phone].  Deductibles and co-payments are collected on the day of service. Please be prepared to pay the required co-pay, deductible or deposit on the day of service as defined by your plan.    Call your surgeon immediately if you experience any of the following symptoms:  Sore Throat  Shortness of Breath or difficulty breathing  Cough  Chills  Body soreness or muscle pain  Headache  Fever  New loss of taste or smell  Do not arrive for your surgery ill.  Your procedure will need to be rescheduled to another time.  You will need to call your physician before the day of surgery to avoid any unnecessary exposure to hospital staff as well as other patients.

## 2023-05-04 ENCOUNTER — HOSPITAL ENCOUNTER (OUTPATIENT)
Dept: ULTRASOUND IMAGING | Facility: HOSPITAL | Age: 73
Discharge: HOME | End: 2023-05-04
Payer: MEDICARE

## 2023-05-04 DIAGNOSIS — N28.89 RENAL MASS, LEFT: ICD-10-CM

## 2023-05-04 PROCEDURE — 76775 US EXAM ABDO BACK WALL LIM: CPT

## 2023-05-08 DIAGNOSIS — N28.89 RENAL MASS, LEFT: Primary | ICD-10-CM

## 2023-05-08 NOTE — H&P
[facility]   HISTORY AND PHYSICAL    Patient Name:Leny Neumann  : 1950  MRN: [id number]  Primary Care Physician: Shellie Gamez APRN  Date of admission: (Not on file)    Subjective   Subjective     Chief Complaint: Hyperparathyroidism    History of Present Illness   Leny Neumann is a 72 y.o. female with elevated PTH levels and elevated calcium consistent with hyperparathyroidism.    Review of Systems      Personal History     Past Medical History:   Diagnosis Date   • Allergic    • Arthritis    • Colon polyp     followed by GI, Dr. Akbar   • Elevated serum creatinine    • GERD (gastroesophageal reflux disease)    • Headache    • Hiatal hernia    • HL (hearing loss)    • Hypercalcemia    • Hyperlipidemia    • Hyperparathyroidism    • Hypothyroidism     transient   • Kidney cysts    • Menopausal hot flushes    • Migraine    • Neck pain    • Osteopenia    • PONV (postoperative nausea and vomiting)    • Tinea pedis        Past Surgical History:   Procedure Laterality Date   • COLONOSCOPY N/A 2018    Procedure: COLONOSCOPY into cecum with polypectomy;  Surgeon: Stephany Akbar MD;  Location: [facility] ENDOSCOPY;  Service: Gastroenterology   • NO PAST SURGERIES         Family History: Her family history includes Alcohol abuse in her daughter; Breast cancer (age of onset: 64) in her mother; Early death in her father and mother; Early death (age of onset: 35) in her maternal grandfather; Hearing loss in her father; Heart attack in her father; Heart disease in her father; Heart failure in her father; Lung cancer (age of onset: 64) in her father.     Social History: She  reports that she quit smoking about 21 years ago. Her smoking use included cigarettes. She started smoking about 58 years ago. She has a 30.00 pack-year smoking history. She has never used smokeless tobacco. She reports that she does not currently use alcohol. She reports that she does not use drugs.    Home  Medications:  acetaminophen, omeprazole, and pravastatin    Allergies:  She has No Known Allergies.    Objective    Objective     Vitals:         Physical Exam     Result Review    Result Review:  I have personally reviewed the results from the time of this admission to 5/8/2023 14:48 EDT and agree with these findings:  []  Laboratory list / accordion  []  Microbiology  []  Radiology  []  EKG/Telemetry   []  Cardiology/Vascular   []  Pathology  []  Old records  []  Other:  Most notable findings include: Sestamibi scan shows probable localization to the right inferior pole at the right tracheoesophageal groove with a 1.3 x 0.7 x 1.2 cm nodularity.      Assessment & Plan   Assessment / Plan     Brief Patient Summary:  Leny Neumann is a 72 y.o. female with hyperparathyroidism and suspicion for right inferior parathyroid adenoma.    Active Hospital Problems:  There are no active hospital problems to display for this patient.    Plan:   Parathyroidectomy probable right inferior.  Risks and benefits discussed.    DVT prophylaxis:  No DVT prophylaxis order currently exists.    Van Cobb MD

## 2023-05-09 ENCOUNTER — TELEPHONE (OUTPATIENT)
Dept: FAMILY MEDICINE CLINIC | Facility: CLINIC | Age: 73
End: 2023-05-09

## 2023-05-09 NOTE — TELEPHONE ENCOUNTER
"  Caller: Leny Neumann \"Kati\"    Relationship: Self    Best call back number: [phone]    What was the call regarding: PATIENT IS RETURNING CHARLIE'S CALL. PATIENT STATES THAT HER VOICEMAIL IS FULL. PLEASE CALL PATIENT BACK AS SOON AS POSSIBLE.     Do you require a callback: YES      "

## 2023-05-11 ENCOUNTER — ANESTHESIA (OUTPATIENT)
Dept: PERIOP | Facility: HOSPITAL | Age: 73
End: 2023-05-11
Payer: MEDICARE

## 2023-05-11 ENCOUNTER — ANESTHESIA EVENT (OUTPATIENT)
Dept: PERIOP | Facility: HOSPITAL | Age: 73
End: 2023-05-11
Payer: MEDICARE

## 2023-05-11 ENCOUNTER — HOSPITAL ENCOUNTER (OUTPATIENT)
Facility: HOSPITAL | Age: 73
Setting detail: HOSPITAL OUTPATIENT SURGERY
Discharge: HOME | End: 2023-05-11
Attending: OTOLARYNGOLOGY | Admitting: OTOLARYNGOLOGY
Payer: MEDICARE

## 2023-05-11 VITALS
HEIGHT: 60 IN | SYSTOLIC BLOOD PRESSURE: 146 MMHG | OXYGEN SATURATION: 97 % | WEIGHT: 169.2 LBS | RESPIRATION RATE: 16 BRPM | TEMPERATURE: 98.2 F | DIASTOLIC BLOOD PRESSURE: 60 MMHG | BODY MASS INDEX: 33.22 KG/M2 | HEART RATE: 86 BPM

## 2023-05-11 DIAGNOSIS — E21.3 HYPERPARATHYROIDISM: Primary | ICD-10-CM

## 2023-05-11 LAB
PTH-INTACT SERPL-MCNC: 48 PG/ML (ref 15–65)
PTH-INTACT SERPL-SCNC: 177 PG/ML (ref 15–65)
PTH-INTACT SERPL-SCNC: 282 PG/ML (ref 15–65)

## 2023-05-11 PROCEDURE — 25010000002 PROPOFOL 10 MG/ML EMULSION: Performed by: NURSE ANESTHETIST, CERTIFIED REGISTERED

## 2023-05-11 PROCEDURE — 88331 PATH CONSLTJ SURG 1 BLK 1SPC: CPT | Performed by: OTOLARYNGOLOGY

## 2023-05-11 PROCEDURE — 88305 TISSUE EXAM BY PATHOLOGIST: CPT | Performed by: OTOLARYNGOLOGY

## 2023-05-11 PROCEDURE — 25010000002 FENTANYL CITRATE (PF) 50 MCG/ML SOLUTION: Performed by: NURSE ANESTHETIST, CERTIFIED REGISTERED

## 2023-05-11 PROCEDURE — 25010000002 DEXAMETHASONE SODIUM PHOSPHATE 20 MG/5ML SOLUTION: Performed by: NURSE ANESTHETIST, CERTIFIED REGISTERED

## 2023-05-11 PROCEDURE — 83970 ASSAY OF PARATHORMONE: CPT | Performed by: OTOLARYNGOLOGY

## 2023-05-11 PROCEDURE — 25010000002 SUGAMMADEX 200 MG/2ML SOLUTION: Performed by: NURSE ANESTHETIST, CERTIFIED REGISTERED

## 2023-05-11 DEVICE — HORIZON TI MED 6/CART
Type: IMPLANTABLE DEVICE | Site: NECK | Status: FUNCTIONAL
Brand: WECK

## 2023-05-11 RX ORDER — LABETALOL HYDROCHLORIDE 5 MG/ML
5 INJECTION, SOLUTION INTRAVENOUS
Status: DISCONTINUED | OUTPATIENT
Start: 2023-05-11 | End: 2023-05-11 | Stop reason: HOSPADM

## 2023-05-11 RX ORDER — DROPERIDOL 2.5 MG/ML
0.62 INJECTION, SOLUTION INTRAMUSCULAR; INTRAVENOUS
Status: DISCONTINUED | OUTPATIENT
Start: 2023-05-11 | End: 2023-05-11 | Stop reason: HOSPADM

## 2023-05-11 RX ORDER — SODIUM CHLORIDE 0.9 % (FLUSH) 0.9 %
3 SYRINGE (ML) INJECTION EVERY 12 HOURS SCHEDULED
Status: DISCONTINUED | OUTPATIENT
Start: 2023-05-11 | End: 2023-05-11 | Stop reason: HOSPADM

## 2023-05-11 RX ORDER — HYDRALAZINE HYDROCHLORIDE 20 MG/ML
5 INJECTION INTRAMUSCULAR; INTRAVENOUS
Status: DISCONTINUED | OUTPATIENT
Start: 2023-05-11 | End: 2023-05-11 | Stop reason: HOSPADM

## 2023-05-11 RX ORDER — HYDROCODONE BITARTRATE AND ACETAMINOPHEN 7.5; 325 MG/1; MG/1
1 TABLET ORAL ONCE AS NEEDED
Status: DISCONTINUED | OUTPATIENT
Start: 2023-05-11 | End: 2023-05-11 | Stop reason: HOSPADM

## 2023-05-11 RX ORDER — MIDAZOLAM HYDROCHLORIDE 1 MG/ML
0.5 INJECTION INTRAMUSCULAR; INTRAVENOUS
Status: DISCONTINUED | OUTPATIENT
Start: 2023-05-11 | End: 2023-05-11 | Stop reason: HOSPADM

## 2023-05-11 RX ORDER — PROPOFOL 10 MG/ML
VIAL (ML) INTRAVENOUS AS NEEDED
Status: DISCONTINUED | OUTPATIENT
Start: 2023-05-11 | End: 2023-05-11 | Stop reason: SURG

## 2023-05-11 RX ORDER — ROCURONIUM BROMIDE 10 MG/ML
INJECTION, SOLUTION INTRAVENOUS AS NEEDED
Status: DISCONTINUED | OUTPATIENT
Start: 2023-05-11 | End: 2023-05-11 | Stop reason: SURG

## 2023-05-11 RX ORDER — SCOLOPAMINE TRANSDERMAL SYSTEM 1 MG/1
1 PATCH, EXTENDED RELEASE TRANSDERMAL ONCE
Status: DISCONTINUED | OUTPATIENT
Start: 2023-05-11 | End: 2023-05-11 | Stop reason: HOSPADM

## 2023-05-11 RX ORDER — EPHEDRINE SULFATE 50 MG/ML
5 INJECTION, SOLUTION INTRAVENOUS ONCE AS NEEDED
Status: DISCONTINUED | OUTPATIENT
Start: 2023-05-11 | End: 2023-05-11 | Stop reason: HOSPADM

## 2023-05-11 RX ORDER — PROMETHAZINE HYDROCHLORIDE 25 MG/1
25 SUPPOSITORY RECTAL ONCE AS NEEDED
Status: DISCONTINUED | OUTPATIENT
Start: 2023-05-11 | End: 2023-05-11 | Stop reason: HOSPADM

## 2023-05-11 RX ORDER — SODIUM CHLORIDE 0.9 % (FLUSH) 0.9 %
3-10 SYRINGE (ML) INJECTION AS NEEDED
Status: DISCONTINUED | OUTPATIENT
Start: 2023-05-11 | End: 2023-05-11 | Stop reason: HOSPADM

## 2023-05-11 RX ORDER — HYDROCODONE BITARTRATE AND ACETAMINOPHEN 5; 325 MG/1; MG/1
1-2 TABLET ORAL EVERY 6 HOURS PRN
Qty: 20 TABLET | Refills: 0 | Status: SHIPPED | OUTPATIENT
Start: 2023-05-11

## 2023-05-11 RX ORDER — FENTANYL CITRATE 50 UG/ML
INJECTION, SOLUTION INTRAMUSCULAR; INTRAVENOUS AS NEEDED
Status: DISCONTINUED | OUTPATIENT
Start: 2023-05-11 | End: 2023-05-11 | Stop reason: SURG

## 2023-05-11 RX ORDER — ONDANSETRON 2 MG/ML
4 INJECTION INTRAMUSCULAR; INTRAVENOUS ONCE AS NEEDED
Status: DISCONTINUED | OUTPATIENT
Start: 2023-05-11 | End: 2023-05-11 | Stop reason: HOSPADM

## 2023-05-11 RX ORDER — SODIUM CHLORIDE, SODIUM LACTATE, POTASSIUM CHLORIDE, CALCIUM CHLORIDE 600; 310; 30; 20 MG/100ML; MG/100ML; MG/100ML; MG/100ML
9 INJECTION, SOLUTION INTRAVENOUS CONTINUOUS
Status: DISCONTINUED | OUTPATIENT
Start: 2023-05-11 | End: 2023-05-11 | Stop reason: HOSPADM

## 2023-05-11 RX ORDER — OXYCODONE AND ACETAMINOPHEN 7.5; 325 MG/1; MG/1
1 TABLET ORAL EVERY 4 HOURS PRN
Status: DISCONTINUED | OUTPATIENT
Start: 2023-05-11 | End: 2023-05-11 | Stop reason: HOSPADM

## 2023-05-11 RX ORDER — LIDOCAINE HYDROCHLORIDE AND EPINEPHRINE 10; 10 MG/ML; UG/ML
INJECTION, SOLUTION INFILTRATION; PERINEURAL AS NEEDED
Status: DISCONTINUED | OUTPATIENT
Start: 2023-05-11 | End: 2023-05-11 | Stop reason: HOSPADM

## 2023-05-11 RX ORDER — FAMOTIDINE 10 MG/ML
20 INJECTION, SOLUTION INTRAVENOUS ONCE
Status: COMPLETED | OUTPATIENT
Start: 2023-05-11 | End: 2023-05-11

## 2023-05-11 RX ORDER — FENTANYL CITRATE 50 UG/ML
50 INJECTION, SOLUTION INTRAMUSCULAR; INTRAVENOUS ONCE AS NEEDED
Status: DISCONTINUED | OUTPATIENT
Start: 2023-05-11 | End: 2023-05-11 | Stop reason: HOSPADM

## 2023-05-11 RX ORDER — NALOXONE HCL 0.4 MG/ML
0.2 VIAL (ML) INJECTION AS NEEDED
Status: DISCONTINUED | OUTPATIENT
Start: 2023-05-11 | End: 2023-05-11 | Stop reason: HOSPADM

## 2023-05-11 RX ORDER — ONDANSETRON HYDROCHLORIDE 8 MG/1
8 TABLET, FILM COATED ORAL EVERY 8 HOURS PRN
Qty: 15 TABLET | Refills: 0 | Status: SHIPPED | OUTPATIENT
Start: 2023-05-11

## 2023-05-11 RX ORDER — IPRATROPIUM BROMIDE AND ALBUTEROL SULFATE 2.5; .5 MG/3ML; MG/3ML
3 SOLUTION RESPIRATORY (INHALATION) ONCE AS NEEDED
Status: DISCONTINUED | OUTPATIENT
Start: 2023-05-11 | End: 2023-05-11 | Stop reason: HOSPADM

## 2023-05-11 RX ORDER — EPHEDRINE SULFATE 50 MG/ML
INJECTION INTRAVENOUS AS NEEDED
Status: DISCONTINUED | OUTPATIENT
Start: 2023-05-11 | End: 2023-05-11 | Stop reason: SURG

## 2023-05-11 RX ORDER — LIDOCAINE HYDROCHLORIDE 40 MG/ML
SOLUTION TOPICAL AS NEEDED
Status: DISCONTINUED | OUTPATIENT
Start: 2023-05-11 | End: 2023-05-11 | Stop reason: SURG

## 2023-05-11 RX ORDER — LIDOCAINE HYDROCHLORIDE 20 MG/ML
INJECTION, SOLUTION INFILTRATION; PERINEURAL AS NEEDED
Status: DISCONTINUED | OUTPATIENT
Start: 2023-05-11 | End: 2023-05-11 | Stop reason: SURG

## 2023-05-11 RX ORDER — FENTANYL CITRATE 50 UG/ML
50 INJECTION, SOLUTION INTRAMUSCULAR; INTRAVENOUS
Status: DISCONTINUED | OUTPATIENT
Start: 2023-05-11 | End: 2023-05-11 | Stop reason: HOSPADM

## 2023-05-11 RX ORDER — FLUMAZENIL 0.1 MG/ML
0.2 INJECTION INTRAVENOUS AS NEEDED
Status: DISCONTINUED | OUTPATIENT
Start: 2023-05-11 | End: 2023-05-11 | Stop reason: HOSPADM

## 2023-05-11 RX ORDER — HYDROCODONE BITARTRATE AND ACETAMINOPHEN 7.5; 325 MG/1; MG/1
1 TABLET ORAL ONCE AS NEEDED
Status: COMPLETED | OUTPATIENT
Start: 2023-05-11 | End: 2023-05-11

## 2023-05-11 RX ORDER — DEXAMETHASONE SODIUM PHOSPHATE 4 MG/ML
INJECTION, SOLUTION INTRA-ARTICULAR; INTRALESIONAL; INTRAMUSCULAR; INTRAVENOUS; SOFT TISSUE AS NEEDED
Status: DISCONTINUED | OUTPATIENT
Start: 2023-05-11 | End: 2023-05-11 | Stop reason: SURG

## 2023-05-11 RX ORDER — PROMETHAZINE HYDROCHLORIDE 25 MG/1
25 TABLET ORAL ONCE AS NEEDED
Status: DISCONTINUED | OUTPATIENT
Start: 2023-05-11 | End: 2023-05-11 | Stop reason: HOSPADM

## 2023-05-11 RX ORDER — MAGNESIUM HYDROXIDE 1200 MG/15ML
LIQUID ORAL AS NEEDED
Status: DISCONTINUED | OUTPATIENT
Start: 2023-05-11 | End: 2023-05-11 | Stop reason: HOSPADM

## 2023-05-11 RX ORDER — HYDROMORPHONE HYDROCHLORIDE 1 MG/ML
0.5 INJECTION, SOLUTION INTRAMUSCULAR; INTRAVENOUS; SUBCUTANEOUS
Status: DISCONTINUED | OUTPATIENT
Start: 2023-05-11 | End: 2023-05-11 | Stop reason: HOSPADM

## 2023-05-11 RX ORDER — BACITRACIN ZINC 500 [USP'U]/G
OINTMENT TOPICAL AS NEEDED
Status: DISCONTINUED | OUTPATIENT
Start: 2023-05-11 | End: 2023-05-11 | Stop reason: HOSPADM

## 2023-05-11 RX ORDER — PROPOFOL 10 MG/ML
VIAL (ML) INTRAVENOUS CONTINUOUS PRN
Status: DISCONTINUED | OUTPATIENT
Start: 2023-05-11 | End: 2023-05-11 | Stop reason: SURG

## 2023-05-11 RX ORDER — DIPHENHYDRAMINE HYDROCHLORIDE 50 MG/ML
12.5 INJECTION INTRAMUSCULAR; INTRAVENOUS
Status: DISCONTINUED | OUTPATIENT
Start: 2023-05-11 | End: 2023-05-11 | Stop reason: HOSPADM

## 2023-05-11 RX ORDER — LIDOCAINE HYDROCHLORIDE 10 MG/ML
0.5 INJECTION, SOLUTION EPIDURAL; INFILTRATION; INTRACAUDAL; PERINEURAL ONCE AS NEEDED
Status: DISCONTINUED | OUTPATIENT
Start: 2023-05-11 | End: 2023-05-11 | Stop reason: HOSPADM

## 2023-05-11 RX ADMIN — FAMOTIDINE 20 MG: 10 INJECTION INTRAVENOUS at 06:48

## 2023-05-11 RX ADMIN — DEXAMETHASONE SODIUM PHOSPHATE 6 MG: 4 INJECTION, SOLUTION INTRAMUSCULAR; INTRAVENOUS at 07:55

## 2023-05-11 RX ADMIN — SUGAMMADEX 200 MG: 100 INJECTION, SOLUTION INTRAVENOUS at 09:02

## 2023-05-11 RX ADMIN — SODIUM CHLORIDE, POTASSIUM CHLORIDE, SODIUM LACTATE AND CALCIUM CHLORIDE 9 ML/HR: 600; 310; 30; 20 INJECTION, SOLUTION INTRAVENOUS at 06:20

## 2023-05-11 RX ADMIN — HYDROCODONE BITARTRATE AND ACETAMINOPHEN 1 TABLET: 7.5; 325 TABLET ORAL at 09:26

## 2023-05-11 RX ADMIN — ROCURONIUM BROMIDE 40 MG: 10 INJECTION, SOLUTION INTRAVENOUS at 07:35

## 2023-05-11 RX ADMIN — EPHEDRINE SULFATE 10 MG: 50 INJECTION INTRAVENOUS at 07:45

## 2023-05-11 RX ADMIN — SCOPALAMINE 1 PATCH: 1 PATCH, EXTENDED RELEASE TRANSDERMAL at 06:47

## 2023-05-11 RX ADMIN — LIDOCAINE HYDROCHLORIDE 60 MG: 20 INJECTION, SOLUTION INFILTRATION; PERINEURAL at 07:35

## 2023-05-11 RX ADMIN — Medication 25 MCG/KG/MIN: at 07:45

## 2023-05-11 RX ADMIN — LIDOCAINE HYDROCHLORIDE 1 EACH: 40 SOLUTION TOPICAL at 07:36

## 2023-05-11 RX ADMIN — FENTANYL CITRATE 50 MCG: 50 INJECTION, SOLUTION INTRAMUSCULAR; INTRAVENOUS at 07:33

## 2023-05-11 RX ADMIN — PROPOFOL 150 MG: 10 INJECTION, EMULSION INTRAVENOUS at 07:35

## 2023-05-11 NOTE — ANESTHESIA POSTPROCEDURE EVALUATION
"Patient: Leny Neumann    Procedure Summary     Date: 05/11/23 Room / Location: [facility] OR 03 / [facility] MAIN OR    Anesthesia Start: 0728 Anesthesia Stop: 0908    Procedure: PARATHYROIDECTOMY (Neck) Diagnosis:     Surgeons: Van Cobb MD Provider: Dedrick Ellison MD    Anesthesia Type: general ASA Status: 2          Anesthesia Type: general    Vitals  Vitals Value Taken Time   /89 05/11/23 0916   Temp 36.8 °C (98.2 °F) 05/11/23 0906   Pulse 85 05/11/23 0932   Resp 16 05/11/23 0915   SpO2 94 % 05/11/23 0932   Vitals shown include unvalidated device data.        Post Anesthesia Care and Evaluation    Patient location during evaluation: PACU  Patient participation: complete - patient participated  Level of consciousness: awake  Pain management: adequate    Airway patency: patent  Anesthetic complications: No anesthetic complications  PONV Status: controlled  Cardiovascular status: acceptable  Respiratory status: acceptable  Hydration status: acceptable    Comments: /89   Pulse 85   Temp 36.8 °C (98.2 °F) (Oral)   Resp 16   Ht 152.4 cm (60\")   Wt 76.7 kg (169 lb 3.2 oz)   SpO2 94%   BMI 33.04 kg/m²         "

## 2023-05-11 NOTE — OP NOTE
PARATHYROIDECTOMY  Progress Note    Leny Neumann  5/11/2023    Pre-op Diagnosis:   Hyperparathyroidism       Post-Op Diagnosis Codes:  Same    Procedure/CPT® Codes:        Procedure(s):  PARATHYROIDECTOMY RIGHT INFERIOR        Surgeon(s):  Van Cobb MD    Anesthesia: General    Staff:   Circulator: Tri Peña RN; Shellie Mota RN  Scrub Person: Chiara Kramer         Estimated Blood Loss: 10 ml    Urine Voided: * No values recorded between 5/11/2023  7:28 AM and 5/11/2023  8:49 AM *    Specimens:                Specimens     ID Source Type Tests Collected By Collected At Frozen?    1 Blood, Venous Line Blood · PTH INTRAOPERATIVE   Van Cobb MD 5/11/23 0837     Description: STAT INTRA OP PTH #1 5 MIN DRAW BY HERNANDEZ HAYNES CRNA AT 0837 PLEASE CALL [phone] WITH RESULTS    A Parathyroid Gland Tissue · TISSUE PATHOLOGY EXAM   Van Cobb MD 5/11/23 0832 Yes    Description: RIGHT INFERIOR PARATHYROID POSSIBLE ADENOMA FOR FROZEN PLEASE CALL OR 3 WITH RESULTS     B Lymph Node Lymph Node · TISSUE PATHOLOGY EXAM   Van Cobb MD 5/11/23 0835 No    Description: RIGHT LEVEL 6 LYMPH NODE FOR PERMANENT                Drains: * No LDAs found *    Findings: Approximately 2 cm enlarged firm right inferior parathyroid adenoma.  Hypercellular parathyroid tissue confirmed on frozen section.    OPERATIVE REPORT: The patient was taken to the operating room placed in the supine position and placed under general endotracheal anesthesia.  The skin incision was planned in a natural skin crease and injected with 1% lidocaine with epinephrine.  Sterile prep and drape with Hibiclens was performed.  The incision was then incised with a 15 blade scalpel.  This was carried through the subcutaneous fat and platysma.  Subplatysmal flaps were elevated.  The midline raphae was then found and the strap muscles were reflected laterally over the right thyroid lobe only.  Careful dissection on the capsule of  the gland was performed.  The Harmonic scalpel was used to take down the blood supply to the thyroid where necessary to allow reflection anteriorly to inspect the suspected parathyroid gland.  This was dissected free with the Wood dissector and the harmonic scalpel.  Upon removal, frozen section diagnosis confirmed hypercellular parathyroid tissue.  Irrigation was performed and hemostasis was excellent. The midline raphae was then reapproximated with 3-0 Vicryl suture.  A 4-0 Vicryl suture was used for platysmal and dermal closure.   5-0 nylon was then used to close the epidermis. Bacitracin was then applied.  At this point the procedure was complete.  The patient was allowed to awake from anesthesia and taken to the recovery room in satisfactory condition.     Complications: none          Van Cobb MD     Date: 5/11/2023  Time: 08:56 EDT

## 2023-05-11 NOTE — ANESTHESIA PROCEDURE NOTES
Airway  Urgency: elective    Date/Time: 5/11/2023 7:39 AM  Airway not difficult    General Information and Staff    Patient location during procedure: OR  Anesthesiologist: Dedrick Ellison MD  CRNA/CAA: Isreal Grover CRNA    Indications and Patient Condition  Indications for airway management: airway protection    Preoxygenated: yes  MILS maintained throughout  Mask difficulty assessment: 1 - vent by mask    Final Airway Details  Final airway type: endotracheal airway      Successful airway: ETT  Cuffed: yes   Successful intubation technique: direct laryngoscopy  Endotracheal tube insertion site: oral  Blade: Fabrizio  Blade size: 3  ETT size (mm): 7.0  Cormack-Lehane Classification: grade I - full view of glottis  Placement verified by: chest auscultation and capnometry   Measured from: lips  ETT/EBT  to lips (cm): 20  Number of attempts at approach: 1  Assessment: lips, teeth, and gum same as pre-op and atraumatic intubation

## 2023-05-11 NOTE — ANESTHESIA PREPROCEDURE EVALUATION
Anesthesia Evaluation     Patient summary reviewed and Nursing notes reviewed   history of anesthetic complications: PONV  NPO Solid Status: > 8 hours  NPO Liquid Status: > 2 hours           Airway   Mallampati: III  TM distance: >3 FB  Neck ROM: full  Possible difficult intubation  Dental - normal exam     Comment: Permanent bridge upper right back    Pulmonary - normal exam    breath sounds clear to auscultation  (+) a smoker Former,   Cardiovascular - normal exam    ECG reviewed  Rhythm: regular  Rate: normal    (+) hyperlipidemia,     ROS comment: EF 68%, normal stress ECHO 4/23    Neuro/Psych  (+) headaches, numbness,      ROS Comment: Migraines/numbness and tingling both feet  GI/Hepatic/Renal/Endo    (+) obesity,  hiatal hernia, GERD,  renal disease, thyroid problem hypothyroidism    ROS Comment: Hypercalcemia    Musculoskeletal     (+) neck pain,   Abdominal   (+) obese,    Substance History      OB/GYN          Other   arthritis,                      Anesthesia Plan    ASA 2     general     (May need CMAC for intubation/consider TIVA or background Propofol drip for hx of severe PONV)  intravenous induction     Anesthetic plan, risks, benefits, and alternatives have been provided, discussed and informed consent has been obtained with: patient.    Plan discussed with CRNA.        CODE STATUS:

## 2023-05-12 LAB
LAB AP CASE REPORT: NORMAL
Lab: NORMAL
PATH REPORT.FINAL DX SPEC: NORMAL
PATH REPORT.GROSS SPEC: NORMAL

## 2023-06-15 ENCOUNTER — HOSPITAL ENCOUNTER (OUTPATIENT)
Dept: CT IMAGING | Facility: HOSPITAL | Age: 73
Discharge: HOME | End: 2023-06-15
Payer: MEDICARE

## 2023-06-15 DIAGNOSIS — N28.89 RENAL MASS, LEFT: ICD-10-CM

## 2023-06-15 PROCEDURE — 74178 CT ABD&PLV WO CNTR FLWD CNTR: CPT

## 2023-06-15 PROCEDURE — 0 DIATRIZOATE MEGLUMINE & SODIUM PER 1 ML: Performed by: NURSE PRACTITIONER

## 2023-06-15 PROCEDURE — 25510000001 IOPAMIDOL 61 % SOLUTION: Performed by: NURSE PRACTITIONER

## 2023-06-15 PROCEDURE — 82565 ASSAY OF CREATININE: CPT

## 2023-06-15 RX ADMIN — IOPAMIDOL 100 ML: 612 INJECTION, SOLUTION INTRAVENOUS at 14:00

## 2023-06-15 RX ADMIN — DIATRIZOATE MEGLUMINE AND DIATRIZOATE SODIUM 30 ML: 660; 100 LIQUID ORAL; RECTAL at 13:00

## 2023-06-16 LAB — CREAT BLDA-MCNC: 1 MG/DL (ref 0.6–1.3)

## 2023-10-12 DIAGNOSIS — E78.2 MIXED HYPERLIPIDEMIA: ICD-10-CM

## 2023-10-12 RX ORDER — PRAVASTATIN SODIUM 40 MG
40 TABLET ORAL DAILY
Qty: 90 TABLET | Refills: 1 | Status: SHIPPED | OUTPATIENT
Start: 2023-10-12

## 2023-10-12 NOTE — TELEPHONE ENCOUNTER
"Caller: Leny Neumann \"Kati\"    Relationship: Self    Best call back number: [phone]    Requested Prescriptions:   Requested Prescriptions     Pending Prescriptions Disp Refills    pravastatin (PRAVACHOL) 40 MG tablet 90 tablet 1     Sig: Take 1 tablet by mouth Daily.        Pharmacy where request should be sent: [id number] - [place], [place] - [address]  [address] [facility] [address]  - [phone] [facility] [phone] FX     Last office visit with prescribing clinician: 3/6/2023   Last telemedicine visit with prescribing clinician: Visit date not found   Next office visit with prescribing clinician: Visit date not found     Additional details provided by patient:PLEASE SEND VITAMIN D AS A REFILL. THANKS     Does the patient have less than a 3 day supply:  X Yes  [] No    Would you like a call back once the refill request has been completed: [x] Yes [] No    If the office needs to give you a call back, can they leave a voicemail: [x] Yes [] No    Naina Shah Rep   10/12/23 11:48 EDT     "

## 2023-10-24 ENCOUNTER — TELEPHONE (OUTPATIENT)
Dept: FAMILY MEDICINE CLINIC | Facility: CLINIC | Age: 73
End: 2023-10-24

## 2023-10-24 NOTE — TELEPHONE ENCOUNTER
"Caller: Leny Neumann \"aKti\"    Relationship: Self    Best call back number: [phone]     What medication are you requesting: VITAMIN D    What are your current symptoms: THE PATIENT HAD VITAMIN D PRESCRIBED BY ANOTHER PROVIDER WHO NO LONGER PRACTICES. SHE FELT GOOD ON THIS VITAMIN D AND WOULD LIKE TO CONTINUE TAKING IT.    If a prescription is needed, what is your preferred pharmacy and phone number: [id number] - [place], [place] - [address] [facility] [address]  - [phone] [facility] [phone] FX     Additional notes: PATIENT WILL CALL BACK WITH THE DOSAGE OF HER VITAMIN D THAT HAS RUN OUT.    "

## 2023-10-26 ENCOUNTER — TELEPHONE (OUTPATIENT)
Dept: FAMILY MEDICINE CLINIC | Facility: CLINIC | Age: 73
End: 2023-10-26

## 2023-10-26 NOTE — TELEPHONE ENCOUNTER
"  Caller: Leny Neumann \"Kati\"    Relationship: Self    Best call back number: [phone]    Requested Prescriptions:   Requested Prescriptions      No prescriptions requested or ordered in this encounter        Pharmacy where request should be sent:      Last office visit with prescribing clinician: 3/6/2023   Last telemedicine visit with prescribing clinician: Visit date not found   Next office visit with prescribing clinician: Visit date not found     Additional details provided by patient: PLEASE REFILL MEDICATION FOR ERGOCALCIFEROL 1.25 MG WITH DIRECTIONS 1XDAY FOR 5 DAYS AND THEN 1 CAPSULE 1X WEEK FOR 3 MONTHS . VITAMIN-D 25- HYDROXY PER PATIENT.  [facility] [place] [address], PHONE [phone]. THANKS     Does the patient have less than a 3 day supply:  [x] Yes  [] No    Would you like a call back once the refill request has been completed: [x] Yes [] No    If the office needs to give you a call back, can they leave a voicemail: [x] Yes [] No    Naina Shah Rep   10/26/23 10:31 EDT     "

## 2024-01-19 DIAGNOSIS — E78.2 MIXED HYPERLIPIDEMIA: ICD-10-CM

## 2024-01-19 RX ORDER — PRAVASTATIN SODIUM 40 MG
40 TABLET ORAL DAILY
Qty: 90 TABLET | Refills: 1 | Status: SHIPPED | OUTPATIENT
Start: 2024-01-19

## 2024-03-07 ENCOUNTER — OFFICE VISIT (OUTPATIENT)
Dept: FAMILY MEDICINE CLINIC | Facility: CLINIC | Age: 74
End: 2024-03-07
Payer: MEDICARE

## 2024-03-07 VITALS
WEIGHT: 174 LBS | BODY MASS INDEX: 34.16 KG/M2 | OXYGEN SATURATION: 97 % | HEART RATE: 62 BPM | HEIGHT: 60 IN | SYSTOLIC BLOOD PRESSURE: 168 MMHG | RESPIRATION RATE: 20 BRPM | DIASTOLIC BLOOD PRESSURE: 80 MMHG

## 2024-03-07 DIAGNOSIS — E78.2 MIXED HYPERLIPIDEMIA: ICD-10-CM

## 2024-03-07 DIAGNOSIS — L29.9 PRURITUS, UNSPECIFIED: ICD-10-CM

## 2024-03-07 DIAGNOSIS — G62.9 PERIPHERAL POLYNEUROPATHY: ICD-10-CM

## 2024-03-07 DIAGNOSIS — R06.83 SNORING: ICD-10-CM

## 2024-03-07 DIAGNOSIS — Z00.00 ENCOUNTER FOR SUBSEQUENT ANNUAL WELLNESS VISIT (AWV) IN MEDICARE PATIENT: Primary | ICD-10-CM

## 2024-03-07 DIAGNOSIS — G44.209 ACUTE NON INTRACTABLE TENSION-TYPE HEADACHE: ICD-10-CM

## 2024-03-07 DIAGNOSIS — L29.9 PRURITUS: ICD-10-CM

## 2024-03-07 DIAGNOSIS — Z12.31 BREAST CANCER SCREENING BY MAMMOGRAM: ICD-10-CM

## 2024-03-07 DIAGNOSIS — R79.9 ABNORMAL FINDING OF BLOOD CHEMISTRY, UNSPECIFIED: ICD-10-CM

## 2024-03-07 DIAGNOSIS — R53.82 CHRONIC FATIGUE: ICD-10-CM

## 2024-03-07 DIAGNOSIS — L82.1 SK (SEBORRHEIC KERATOSIS): ICD-10-CM

## 2024-03-07 DIAGNOSIS — K21.9 GASTROESOPHAGEAL REFLUX DISEASE WITHOUT ESOPHAGITIS: ICD-10-CM

## 2024-03-07 DIAGNOSIS — E66.09 CLASS 1 OBESITY DUE TO EXCESS CALORIES WITH SERIOUS COMORBIDITY AND BODY MASS INDEX (BMI) OF 33.0 TO 33.9 IN ADULT: ICD-10-CM

## 2024-03-07 DIAGNOSIS — Z12.11 COLON CANCER SCREENING: ICD-10-CM

## 2024-03-07 PROBLEM — E66.811 CLASS 1 OBESITY DUE TO EXCESS CALORIES WITH SERIOUS COMORBIDITY AND BODY MASS INDEX (BMI) OF 33.0 TO 33.9 IN ADULT: Status: ACTIVE | Noted: 2024-03-07

## 2024-03-07 RX ORDER — PRAVASTATIN SODIUM 40 MG
40 TABLET ORAL DAILY
Qty: 90 TABLET | Refills: 3 | Status: SHIPPED | OUTPATIENT
Start: 2024-03-07 | End: 2024-03-07 | Stop reason: SDUPTHER

## 2024-03-07 RX ORDER — PRAVASTATIN SODIUM 40 MG
40 TABLET ORAL DAILY
Qty: 90 TABLET | Refills: 3 | Status: SHIPPED | OUTPATIENT
Start: 2024-03-07

## 2024-03-07 RX ORDER — OMEPRAZOLE 20 MG/1
20 CAPSULE, DELAYED RELEASE ORAL AS NEEDED
Start: 2024-03-07

## 2024-03-07 RX ORDER — OMEPRAZOLE 20 MG/1
20 CAPSULE, DELAYED RELEASE ORAL AS NEEDED
Start: 2024-03-07 | End: 2024-03-07 | Stop reason: SDUPTHER

## 2024-03-07 NOTE — PROGRESS NOTES
The ABCs of the Annual Wellness Visit  Subsequent Medicare Wellness Visit    Subjective    Leny Neumann is a 73 y.o. female who presents for a Subsequent Medicare Wellness Visit.    The following portions of the patient's history were reviewed and   updated as appropriate: allergies, current medications, past family history, past medical history, past social history, past surgical history, and problem list.    Compared to one year ago, the patient feels her physical   health is better.    Compared to one year ago, the patient feels her mental   health is better.    Recent Hospitalizations:  She was not admitted to the hospital during the last year.       Current Medical Providers:  Patient Care Team:  Shellie Gamez APRN as PCP - General (Family Medicine)  Deedee, Morgan Dias MD as Consulting Physician (Otolaryngology)  Sharon Parker MD as Consulting Physician (Cardiology)    Outpatient Medications Prior to Visit   Medication Sig Dispense Refill    acetaminophen (TYLENOL) 500 MG tablet Take 1 tablet by mouth Every 6 (Six) Hours As Needed for Mild Pain. As needed      pravastatin (PRAVACHOL) 40 MG tablet TAKE 1 TABLET BY MOUTH DAILY 90 tablet 1    ondansetron (Zofran) 8 MG tablet Take 1 tablet by mouth Every 8 (Eight) Hours As Needed for Nausea or Vomiting. (Patient not taking: Reported on 3/7/2024) 15 tablet 0    HYDROcodone-acetaminophen (NORCO) 5-325 MG per tablet Take 1-2 tablets by mouth Every 6 (Six) Hours As Needed for Moderate Pain or Severe Pain. (Patient not taking: Reported on 3/7/2024) 20 tablet 0    omeprazole (priLOSEC) 40 MG capsule Take 20 mg by mouth As Needed. (Patient not taking: Reported on 3/7/2024)  3     No facility-administered medications prior to visit.       No opioid medication identified on active medication list. I have reviewed chart for other potential  high risk medication/s and harmful drug interactions in the elderly.        Aspirin is not on active medication list.   "Aspirin use is not indicated based on review of current medical condition/s. Risk of harm outweighs potential benefits.  .    Patient Active Problem List   Diagnosis    Hyperlipidemia    Osteopenia    Colon polyp    Allergic    Hypothyroidism    Tinnitus of both ears    Onychomycosis    Other fatigue    Tinea pedis    Menopausal hot flushes    Screening for colon cancer    Occipital neuralgia of right side    Numbness and tingling of both feet    New onset of headaches after age 50    Class 1 obesity due to excess calories with serious comorbidity and body mass index (BMI) of 33.0 to 33.9 in adult     Advance Care Planning   Advance Care Planning     Advance Directive is not on file.  ACP discussion was declined by the patient. Patient does not have an advance directive, information provided.     Objective    Vitals:    24 1003   BP: 168/80   Pulse: 62   Resp: 20   SpO2: 97%   Weight: 78.9 kg (174 lb)   Height: 152.4 cm (60\")     Estimated body mass index is 33.98 kg/m² as calculated from the following:    Height as of this encounter: 152.4 cm (60\").    Weight as of this encounter: 78.9 kg (174 lb).    BMI is >= 30 and <35. (Class 1 Obesity). The following options were offered after discussion;: weight loss educational material (shared in after visit summary), exercise counseling/recommendations, and nutrition counseling/recommendations      Does the patient have evidence of cognitive impairment? No          HEALTH RISK ASSESSMENT    Smoking Status:  Social History     Tobacco Use   Smoking Status Former    Current packs/day: 0.00    Average packs/day: 1 pack/day for 36.7 years (36.7 ttl pk-yrs)    Types: Cigarettes    Start date: 1965    Quit date: 2001    Years since quittin.5   Smokeless Tobacco Never   Tobacco Comments    smoked 1 ppd for 20 yr, quit in , then relapsed for a year in      Alcohol Consumption:  Social History     Substance and Sexual Activity   Alcohol Use Not Currently "     Fall Risk Screen:    STEADI Fall Risk Assessment was completed, and patient is at LOW risk for falls.Assessment completed on:3/7/2024    Depression Screening:      3/7/2024    10:15 AM   PHQ-2/PHQ-9 Depression Screening   Little Interest or Pleasure in Doing Things 0-->not at all   Feeling Down, Depressed or Hopeless 0-->not at all   PHQ-9: Brief Depression Severity Measure Score 0       Health Habits and Functional and Cognitive Screening:      3/7/2024     8:24 AM   Functional & Cognitive Status   Do you have difficulty preparing food and eating? No   Do you have difficulty bathing yourself, getting dressed or grooming yourself? No   Do you have difficulty using the toilet? No   Do you have difficulty moving around from place to place? No   Do you have trouble with steps or getting out of a bed or a chair? No   Current Diet Unhealthy Diet   Dental Exam Up to date   Eye Exam Not up to date   Exercise (times per week) 2 times per week   Current Exercises Include Aerobics;Walking   Do you need help using the phone?  No   Do you need help to go to places out of walking distance? No   Do you need help shopping? No   Do you need help preparing meals?  No   Do you need help with laundry? No   Do you need help taking your medications? No   Do you need help managing money? No   Do you ever drive or ride in a car without wearing a seat belt? No   Have you felt unusual stress, anger or loneliness in the last month? No   Who do you live with? Alone   If you need help, do you have trouble finding someone available to you? No   Have you been bothered in the last four weeks by sexual problems? No   Do you have difficulty concentrating, remembering or making decisions? No       Age-appropriate Screening Schedule:  Refer to the list below for future screening recommendations based on patient's age, sex and/or medical conditions. Orders for these recommended tests are listed in the plan section. The patient has been provided  with a written plan.    Health Maintenance   Topic Date Due    TDAP/TD VACCINES (3 - Td or Tdap) 08/19/2022    LIPID PANEL  03/06/2024    COLORECTAL CANCER SCREENING  03/07/2024 (Originally 1950)    INFLUENZA VACCINE  03/31/2024 (Originally 8/1/2023)    MAMMOGRAM  04/06/2024    DXA SCAN  07/07/2024    ANNUAL WELLNESS VISIT  03/07/2025    BMI FOLLOWUP  03/07/2025    HEPATITIS C SCREENING  Completed    COVID-19 Vaccine  Completed    RSV Vaccine - Adults  Completed    Pneumococcal Vaccine 65+  Completed    ZOSTER VACCINE  Completed    PAP SMEAR  Discontinued                  CMS Preventative Services Quick Reference  Risk Factors Identified During Encounter  Urinary Incontinence:  stable, managed by pt   Dental Screening Recommended  Vision Screening Recommended  The above risks/problems have been discussed with the patient.  Pertinent information has been shared with the patient in the After Visit Summary.  An After Visit Summary and PPPS were made available to the patient.    Follow Up:   Next Medicare Wellness visit to be scheduled in 1 year.       Additional E&M Note during same encounter follows:  Patient has multiple medical problems which are significant and separately identifiable that require additional work above and beyond the Medicare Wellness Visit.      Chief Complaint  Annual Exam, digestive issues (Constipation- does take miralax), Sleep Apnea (Struggles to breathe at night), and Weight Gain (Did do Isa Chase- gained it all back once finished. Starting v-shred ASAP/)    Subjective        HPI  Leny Neumann is also being seen today for GERD, GI upset, constipation/diarrhea, nausea. On omeprazole 20 as needed. She is due colonoscopy, would like to consider EGD as well.     Chronic Snoring, fatigue, difficulty losing weight x years. She did Isa Chase, but gained weight back and is starting Vshred. BMI 33. Would like sleep study    Neuropathy bilat feet, no diabetes, but would like labs checked.  "She denies hx of B12 or iron def. Don't not use rubs or take meds for pain.     She would like to see dermatology for keratitis removal.  These cause itching and patient scratches. topical steroid not helpful she uses a thick emollient lotions    Review of Systems   Constitutional:  Positive for diaphoresis (occasional night sweats) and fatigue. Negative for activity change and fever.   HENT:  Negative for congestion.         Dental exam is utd      Eyes:         Eye exam is due      Respiratory:  Negative for cough, shortness of breath and wheezing.    Cardiovascular:  Negative for chest pain, palpitations and leg swelling.   Gastrointestinal:  Positive for constipation, diarrhea and nausea. Negative for vomiting.        GERD     Endocrine: Negative for cold intolerance, heat intolerance, polydipsia, polyphagia and polyuria.   Genitourinary:  Negative for dysuria and hematuria.        Incontinence   Musculoskeletal:  Positive for arthralgias and back pain.   Skin:  Negative for wound.        Multiple skin/AK/SK to back   Allergic/Immunologic: Negative for environmental allergies.   Neurological:  Positive for numbness. Negative for seizures and syncope.   Hematological:  Does not bruise/bleed easily.   Psychiatric/Behavioral:  Positive for sleep disturbance. Negative for dysphoric mood.        Objective   Vital Signs:  /80   Pulse 62   Resp 20   Ht 152.4 cm (60\")   Wt 78.9 kg (174 lb)   SpO2 97%   BMI 33.98 kg/m²     Physical Exam  Vitals reviewed.   Constitutional:       Appearance: Normal appearance. She is obese.   HENT:      Head: Normocephalic.      Right Ear: Tympanic membrane normal.      Left Ear: Tympanic membrane normal.      Nose: Nose normal.      Mouth/Throat:      Mouth: Mucous membranes are moist.   Eyes:      Pupils: Pupils are equal, round, and reactive to light.   Cardiovascular:      Rate and Rhythm: Normal rate and regular rhythm.      Pulses: Normal pulses.      Heart sounds: Normal " heart sounds.   Pulmonary:      Effort: Pulmonary effort is normal.      Breath sounds: Normal breath sounds.   Abdominal:      General: Bowel sounds are normal.      Palpations: Abdomen is soft.   Musculoskeletal:         General: Normal range of motion.      Cervical back: Normal range of motion.   Skin:     General: Skin is warm and dry.      Comments: Scattered Sk enture back   Neurological:      General: No focal deficit present.      Mental Status: She is alert.   Psychiatric:         Mood and Affect: Mood normal.                         Assessment and Plan   Diagnoses and all orders for this visit:    1. Encounter for subsequent annual wellness visit (AWV) in Medicare patient (Primary)    2. Gastroesophageal reflux disease without esophagitis  -     Ambulatory Referral to Gastroenterology    3. Colon cancer screening  -     Ambulatory Referral to Gastroenterology    4. Acute non intractable tension-type headache    5. Mixed hyperlipidemia  -     Discontinue: pravastatin (PRAVACHOL) 40 MG tablet; Take 1 tablet by mouth Daily.  Dispense: 90 tablet; Refill: 3  -     Comprehensive Metabolic Panel  -     CBC & Differential  -     Lipid Panel With / Chol / HDL Ratio  -     Hemoglobin A1c    6. Snoring  -     Ambulatory Referral to Sleep Medicine    7. Class 1 obesity due to excess calories with serious comorbidity and body mass index (BMI) of 33.0 to 33.9 in adult  -     Ambulatory Referral to Sleep Medicine    8. Chronic fatigue  -     Ambulatory Referral to Sleep Medicine    9. Breast cancer screening by mammogram  -     Mammo Screening Digital Tomosynthesis Bilateral With CAD; Future    10. Peripheral polyneuropathy  -     Hemoglobin A1c  -     TSH  -     T4, Free  -     Magnesium  -     Vitamin B12  -     Folate  -     Iron and TIBC  -     Ferritin    11. Abnormal finding of blood chemistry, unspecified  -     Hemoglobin A1c  -     Iron and TIBC  -     Ferritin    12. Pruritus, unspecified  -     TSH  -     T4,  Free  -     Ambulatory Referral to Dermatology    13. Pruritus    14. SK (seborrheic keratosis)  -     Ambulatory Referral to Dermatology    Other orders  -     Discontinue: omeprazole (priLOSEC) 20 MG capsule; Take 1 capsule by mouth As Needed (GERD).      GERD- limit triggers, refer GI, cw omeprazole 20 mg qd prn, work on diet and exercise    Snoring- refer sleep med, use bed wedge, mouthguard, work on diet and exercise, weight loss    Neuropathy- check labs , use muscle rubs, inspect feet daily    Refer derm for SK    Preventative care- Follow heart healthy diet, drink water, walk daily. Wear seatbelts, wear helmets, wear sunscreens. Follow CDC guidelines for covid and flu          Follow Up   Return in about 1 year (around 3/7/2025) for Medicare Wellness.  Patient was given instructions and counseling regarding her condition or for health maintenance advice. Please see specific information pulled into the AVS if appropriate.

## 2024-03-08 LAB
ALBUMIN SERPL-MCNC: 4.7 G/DL (ref 3.8–4.8)
ALBUMIN/GLOB SERPL: 2 {RATIO} (ref 1.2–2.2)
ALP SERPL-CCNC: 63 IU/L (ref 44–121)
ALT SERPL-CCNC: 21 IU/L (ref 0–32)
AST SERPL-CCNC: 19 IU/L (ref 0–40)
BASOPHILS # BLD AUTO: 0.1 X10E3/UL (ref 0–0.2)
BASOPHILS NFR BLD AUTO: 1 %
BILIRUB SERPL-MCNC: 0.4 MG/DL (ref 0–1.2)
BUN SERPL-MCNC: 13 MG/DL (ref 8–27)
BUN/CREAT SERPL: 14 (ref 12–28)
CALCIUM SERPL-MCNC: 9.5 MG/DL (ref 8.7–10.3)
CHLORIDE SERPL-SCNC: 103 MMOL/L (ref 96–106)
CHOLEST SERPL-MCNC: 239 MG/DL (ref 100–199)
CHOLEST/HDLC SERPL: 5.3 RATIO (ref 0–4.4)
CO2 SERPL-SCNC: 21 MMOL/L (ref 20–29)
CREAT SERPL-MCNC: 0.92 MG/DL (ref 0.57–1)
EGFRCR SERPLBLD CKD-EPI 2021: 67 ML/MIN/1.73
EOSINOPHIL # BLD AUTO: 0.3 X10E3/UL (ref 0–0.4)
EOSINOPHIL NFR BLD AUTO: 4 %
ERYTHROCYTE [DISTWIDTH] IN BLOOD BY AUTOMATED COUNT: 14.5 % (ref 11.7–15.4)
FERRITIN SERPL-MCNC: 67 NG/ML (ref 15–150)
FOLATE SERPL-MCNC: 7.9 NG/ML
GLOBULIN SER CALC-MCNC: 2.4 G/DL (ref 1.5–4.5)
GLUCOSE SERPL-MCNC: NORMAL MG/DL
HBA1C MFR BLD: 6.5 % (ref 4.8–5.6)
HCT VFR BLD AUTO: 43.7 % (ref 34–46.6)
HDLC SERPL-MCNC: 45 MG/DL
HGB BLD-MCNC: 14.5 G/DL (ref 11.1–15.9)
IMM GRANULOCYTES # BLD AUTO: 0.1 X10E3/UL (ref 0–0.1)
IMM GRANULOCYTES NFR BLD AUTO: 1 %
IRON SATN MFR SERPL: 18 % (ref 15–55)
IRON SERPL-MCNC: 63 UG/DL (ref 27–139)
LDLC SERPL CALC-MCNC: 163 MG/DL (ref 0–99)
LYMPHOCYTES # BLD AUTO: 2 X10E3/UL (ref 0.7–3.1)
LYMPHOCYTES NFR BLD AUTO: 24 %
MAGNESIUM SERPL-MCNC: 2.2 MG/DL (ref 1.6–2.3)
MCH RBC QN AUTO: 29.1 PG (ref 26.6–33)
MCHC RBC AUTO-ENTMCNC: 33.2 G/DL (ref 31.5–35.7)
MCV RBC AUTO: 88 FL (ref 79–97)
MONOCYTES # BLD AUTO: 0.5 X10E3/UL (ref 0.1–0.9)
MONOCYTES NFR BLD AUTO: 6 %
NEUTROPHILS # BLD AUTO: 5.3 X10E3/UL (ref 1.4–7)
NEUTROPHILS NFR BLD AUTO: 64 %
PLATELET # BLD AUTO: 264 X10E3/UL (ref 150–450)
POTASSIUM SERPL-SCNC: NORMAL MMOL/L
PROT SERPL-MCNC: 7.1 G/DL (ref 6–8.5)
RBC # BLD AUTO: 4.99 X10E6/UL (ref 3.77–5.28)
SODIUM SERPL-SCNC: 142 MMOL/L (ref 134–144)
T4 FREE SERPL-MCNC: 0.99 NG/DL (ref 0.82–1.77)
TIBC SERPL-MCNC: 351 UG/DL (ref 250–450)
TRIGL SERPL-MCNC: 170 MG/DL (ref 0–149)
TSH SERPL DL<=0.005 MIU/L-ACNC: 3.67 UIU/ML (ref 0.45–4.5)
UIBC SERPL-MCNC: 288 UG/DL (ref 118–369)
VIT B12 SERPL-MCNC: 434 PG/ML (ref 232–1245)
VLDLC SERPL CALC-MCNC: 31 MG/DL (ref 5–40)
WBC # BLD AUTO: 8.3 X10E3/UL (ref 3.4–10.8)

## 2024-04-01 ENCOUNTER — OFFICE VISIT (OUTPATIENT)
Dept: SLEEP MEDICINE | Facility: HOSPITAL | Age: 74
End: 2024-04-01
Payer: MEDICARE

## 2024-04-01 VITALS
WEIGHT: 171 LBS | SYSTOLIC BLOOD PRESSURE: 139 MMHG | BODY MASS INDEX: 33.57 KG/M2 | HEART RATE: 80 BPM | OXYGEN SATURATION: 95 % | HEIGHT: 60 IN | DIASTOLIC BLOOD PRESSURE: 68 MMHG

## 2024-04-01 DIAGNOSIS — G47.33 OBSTRUCTIVE SLEEP APNEA: ICD-10-CM

## 2024-04-01 DIAGNOSIS — E66.9 OBESITY (BMI 30-39.9): ICD-10-CM

## 2024-04-01 DIAGNOSIS — R06.83 LOUD SNORING: ICD-10-CM

## 2024-04-01 DIAGNOSIS — G47.10 HYPERSOMNOLENCE: Primary | ICD-10-CM

## 2024-04-01 PROCEDURE — G0463 HOSPITAL OUTPT CLINIC VISIT: HCPCS

## 2024-04-01 NOTE — PROGRESS NOTES
[facility] SLEEP MEDICINE  [address]  [facility] [id number] [phone]    Referring Physician: Shellie Gamez  PCP: Shellie Gamez APRN    Reason for consult:  Sleep disorders of daytime tiredness    Leny Neumann is a 73 y.o.female was [facility] the [facility] today. She sleeps from 11pm to 6am. Sometimes wakes up feeling tired. Her throat feels thickened and sometimes has difficulty swallowing. Also tends to cough sometimes when she swallows.She tends to fall asleep easily. Has snoring. Lives alone.  [place] Sleepiness Score: 14. Caffeine intake 1-2 per day. Alcohol intake 0 per week.    Leny Neumann  has a past medical history of Allergic, Arthritis, Colon polyp, Elevated serum creatinine, GERD (gastroesophageal reflux disease), Headache, Hiatal hernia, HL (hearing loss), Hypercalcemia, Hyperlipidemia, Hyperparathyroidism, Hypothyroidism, Kidney cysts, Menopausal hot flushes, Migraine, Neck pain, Osteopenia, PONV (postoperative nausea and vomiting), and Tinea pedis.   Recent dx of DM2  Current Medications:    Current Outpatient Medications:     acetaminophen (TYLENOL) 500 MG tablet, Take 1 tablet by mouth Every 6 (Six) Hours As Needed for Mild Pain. As needed, Disp: , Rfl:     omeprazole (priLOSEC) 20 MG capsule, Take 1 capsule by mouth As Needed (GERD)., Disp: , Rfl:     ondansetron (Zofran) 8 MG tablet, Take 1 tablet by mouth Every 8 (Eight) Hours As Needed for Nausea or Vomiting. (Patient not taking: Reported on 3/7/2024), Disp: 15 tablet, Rfl: 0    pravastatin (PRAVACHOL) 40 MG tablet, Take 1 tablet by mouth Daily., Disp: 90 tablet, Rfl: 3   also listed in Sleep Questionnaire.    FH: family history includes Alcohol abuse in her daughter; Breast cancer (age of onset: 64) in her mother; Early death in her father and mother; Early death (age of onset: 35) in her maternal grandfather; Hearing loss in her father; Heart attack in her father; Heart disease in  "her father; Heart failure in her father; Lung cancer (age of onset: 64) in her father.  SH:  reports that she quit smoking about 22 years ago. Her smoking use included cigarettes. She started smoking about 59 years ago. She has a 36.7 pack-year smoking history. She has never used smokeless tobacco. She reports that she does not currently use alcohol. She reports that she does not use drugs.    Pertinent Positive Review of Systems of frequent urination, PND, TMJ  Rest of Review of Systems was negative as recorded in Sleep Questionnaire.        Vital Signs: /68   Pulse 80   Ht 152.4 cm (60\")   Wt 77.6 kg (171 lb)   SpO2 95%   BMI 33.40 kg/m²     Body mass index is 33.4 kg/m².       Tongue: Large       Dentition: good       Pharynx: Posterior pharyngeal pillars are wide   Mallampatti: III (soft and hard palate and base of uvula visible)        General: Alert. Cooperative. Well developed. No acute distress.             Head:  Normocephalic. Symmetrical. Atraumatic.              Eyes: Sclera clear. No icterus. PERRLA. Normal EOM.             Ears: No deformities. Normal hearing.             Nose: No septal deviation. No drainage.          Throat: No oral lesions. No thrush. Moist mucous membranes.    Chest Wall:  Normal shape. Symmetric expansion with respiration. No tenderness.             Neck:  Trachea midline.           Lungs:  Clear to auscultation bilaterally. No wheezes. No rhonchi. No rales. Respirations regular, even and unlabored.            Heart:  Regular rhythm and normal rate. Normal S1 and S2. No murmur.     Abdomen:  Soft, non-tender and non-distended. Normal bowel sounds. No masses.  Extremities:  Moves all extremities well. No edema.           Pulses: Pulses palpable and equal bilaterally.               Skin: Dry. Intact. No bleeding. No rash.           Neuro: Moves all 4 extremities and cranial nerves grossly intact.  Psychiatric: Normal mood and affect.      Report:  No scans are attached " "to the encounter or orders placed in the encounter.      Impression:  1. Hypersomnolence    2. Loud snoring    3. Obesity (BMI 30-39.9)    4. Obstructive sleep apnea          Orders Placed This Encounter   Procedures    Home Sleep Study     Standing Status:   Future     Standing Expiration Date:   4/1/2025     Scheduling Instructions:      Ask patient to sleep on back as much as possible on night of study     Order Specific Question:   May take own meds     Answer:   Yes     Order Specific Question:   Details     Answer:   O2 Implementation per Protocol     Order Specific Question:   Release to patient     Answer:   Routine Release [[id number]]            Plan:  Leny \"Kati\" reports daytime fatigue and sometimes wakes up unrefreshed.  We need to rule out sleep disordered breathing.  She prefers an HST over an in lab test.  I advised her to sleep in all body positions for the HST.  Depending on results she may need treatment with a CPAP device.    She also reports difficulty swallowing and sometimes feeling of sensation in the back of her throat.  This seems unlikely to be related to sleep disordered breathing as it occurs during the day as well.  Advised to discuss with PCP for possible postnasal drainage causing sensation in the back of her throat.  Additionally she reports some difficulty swallowing and this may need further evaluation by GI.  She should pursue this with PCP.    This patient has typical features of obstructive sleep apnea with hypersomnolence snoring and apneas along with elevated BMI and classic oropharyngeal structure.  Likelihood of obstructive sleep apnea is high and a polysomnogram study will therefore be requested.      Possible diagnosis and pathophysiology of obstructive sleep apnea was discussed with the patient.  Health risks of untreated obstructive sleep apnea including cardiovascular risks were discussed.  Patient was cautioned about activities requiring full concentration " especially driving at night or for longer distances, and until hypersomnolence is corrected.    Results of sleep testing will be reviewed to see if patient is a candidate for CPAP machine.  Alternatives to therapy include oral mandibular advancing device (OMAD) were discussed. However OMAD is usually only beneficial in mild obstructive sleep apnea.  The benefit of weight loss in reducing severity of obstructive sleep apnea was discussed.  Patient would benefit from adhering to a strict diet to achieve ideal BMI.     Patient will follow up in this clinic after testing    Thank you for allowing me to participate in your patient's care.    Electronically signed by Ralph Arenas MD, 04/01/24, 3:43 PM EDT.    Part of this note may be an electronic transcription/translation of spoken language to printed text using the Dragon Dictation System.

## 2024-04-11 NOTE — PROGRESS NOTES
"Chief Complaint  Heartburn (gerd), Constipation, and Abdominal Pain    Subjective          History of Present Illness    Leny Neumann is a  73 y.o. female presents for follow up for GERD and colon cancer screening. She is a patient of Dr. Akbar and is new to me. She was last seen in 2018.     Patient reports worsening constipation over the last 6 months.  She reports that she has been taking MiraLAX daily as well as fiber daily but has noticed no difference.  She is even been increasing fiber in her diet and focusing on a healthier diet with no improvement.  She denies any unintentional weight loss, abdominal pain, nausea, vomiting, black or bloody stool.  Her reflux is well-controlled on omeprazole 20 mg but she says that over the last year she has noticed increasing difficulty swallowing specifically pills.  Says that she notices pills feeling stuck in her throat and having to drink lots of water to get them did fully pass.    Colonoscopy 8/16/2018 --> polyps, diverticulosis, nonbleeding internal hemorrhoids.  Polyp showed hyperplastic change.  Recommend follow-up colonoscopy 5 years.    Objective   Vital Signs:   /85   Pulse 77   Temp 95.9 °F (35.5 °C) (Temporal)   Ht 152.4 cm (60\")   Wt 78 kg (172 lb)   SpO2 94%   BMI 33.59 kg/m²       Physical Exam  Constitutional:       General: She is not in acute distress.     Appearance: Normal appearance.   Eyes:      General: No scleral icterus.  Cardiovascular:      Rate and Rhythm: Normal rate.   Pulmonary:      Effort: Pulmonary effort is normal.   Abdominal:      General: Abdomen is flat. Bowel sounds are normal. There is no distension.      Tenderness: There is no abdominal tenderness. There is no guarding.   Skin:     Coloration: Skin is not jaundiced.   Neurological:      General: No focal deficit present.      Mental Status: She is alert and oriented to person, place, and time.   Psychiatric:         Mood and Affect: Mood normal.         " Behavior: Behavior normal.          Result Review :   The following data was reviewed by: Annika Paiz PA-C on 04/12/2024:  CMP          5/1/2023    11:14 6/15/2023    13:23 3/7/2024    11:07   CMP   Glucose 92   CANCELED    BUN 18   13    Creatinine 0.85  1.00  0.92    EGFR 73.8      Sodium 145   142    Potassium 4.5   CANCELED    Chloride 111   103    Calcium 11.2   9.5    Total Protein   7.1    Albumin   4.7    Globulin   2.4    Total Bilirubin   0.4    Alkaline Phosphatase   63    AST (SGOT)   19    ALT (SGPT)   21    BUN/Creatinine Ratio 21.2   14    Anion Gap 8.8        CBC          5/1/2023    11:14 3/7/2024    11:07   CBC   WBC 7.76  8.3    RBC 4.70  4.99    Hemoglobin 13.7  14.5    Hematocrit 43.0  43.7    MCV 91.5  88    MCH 29.1  29.1    MCHC 31.9  33.2    RDW 13.6  14.5    Platelets 236  264              Assessment:   Diagnoses and all orders for this visit:    1. Encounter for screening for malignant neoplasm of colon (Primary)  -     Case Request; Standing  -     Case Request    2. Dysphagia, unspecified type  -     Case Request; Standing  -     Case Request  -     omeprazole (priLOSEC) 40 MG capsule; Take 1 capsule by mouth Daily.  Dispense: 90 capsule; Refill: 1    Other orders  -     Implement Anesthesia orders day of procedure.; Standing  -     Follow Anesthesia Guidelines / Protocol; Future  -     Verify bowel prep was successful; Standing  -     Give tap water enema if bowel prep was insufficient; Standing          Plan:   -Patient due for screening colonoscopy-will add EGD on given worsening dysphagia to evaluate for stricture.  In the meantime we will increase omeprazole to 40 mg   -She has tried and failed MiraLAX and fiber-patient provided samples of Linzess 72 mcg to try.  Patient to call office with update or send [employer] message on how the Linzess is working for        Follow Up   No follow-ups on file.    Dragon dictation used throughout this note.         Annika Paiz  [facility]  [address] [place] [address]  Office: [phone]

## 2024-04-12 ENCOUNTER — TELEPHONE (OUTPATIENT)
Dept: GASTROENTEROLOGY | Facility: CLINIC | Age: 74
End: 2024-04-12
Payer: MEDICARE

## 2024-04-12 ENCOUNTER — OFFICE VISIT (OUTPATIENT)
Dept: GASTROENTEROLOGY | Facility: CLINIC | Age: 74
End: 2024-04-12
Payer: MEDICARE

## 2024-04-12 VITALS
OXYGEN SATURATION: 94 % | DIASTOLIC BLOOD PRESSURE: 85 MMHG | SYSTOLIC BLOOD PRESSURE: 121 MMHG | HEART RATE: 77 BPM | HEIGHT: 60 IN | BODY MASS INDEX: 33.77 KG/M2 | WEIGHT: 172 LBS | TEMPERATURE: 95.9 F

## 2024-04-12 DIAGNOSIS — R13.10 DYSPHAGIA, UNSPECIFIED TYPE: ICD-10-CM

## 2024-04-12 DIAGNOSIS — Z12.11 ENCOUNTER FOR SCREENING FOR MALIGNANT NEOPLASM OF COLON: Primary | ICD-10-CM

## 2024-04-12 RX ORDER — OMEPRAZOLE 40 MG/1
40 CAPSULE, DELAYED RELEASE ORAL DAILY
Qty: 90 CAPSULE | Refills: 1 | Status: SHIPPED | OUTPATIENT
Start: 2024-04-12

## 2024-04-12 NOTE — Clinical Note
73-year-old female with worsening constipation despite daily MiraLAX and fiber supplementation.  Provided her with samples of Linzess to try.  She is also noted worsening dysphagia over the last several months especially to pills - increased omeprazole to 40mg daily.  She is due for repeat colonoscopy so added on EGD to evaluate dysphagia

## 2024-04-19 ENCOUNTER — HOSPITAL ENCOUNTER (OUTPATIENT)
Dept: MAMMOGRAPHY | Facility: HOSPITAL | Age: 74
Discharge: HOME | End: 2024-04-19
Admitting: NURSE PRACTITIONER
Payer: MEDICARE

## 2024-04-19 ENCOUNTER — HOSPITAL ENCOUNTER (OUTPATIENT)
Dept: SLEEP MEDICINE | Facility: HOSPITAL | Age: 74
End: 2024-04-19
Payer: MEDICARE

## 2024-04-19 DIAGNOSIS — G47.33 OBSTRUCTIVE SLEEP APNEA: ICD-10-CM

## 2024-04-19 DIAGNOSIS — Z12.31 BREAST CANCER SCREENING BY MAMMOGRAM: ICD-10-CM

## 2024-04-19 PROCEDURE — 77063 BREAST TOMOSYNTHESIS BI: CPT

## 2024-04-19 PROCEDURE — 77067 SCR MAMMO BI INCL CAD: CPT

## 2024-04-19 PROCEDURE — 95806 SLEEP STUDY UNATT&RESP EFFT: CPT

## 2024-04-23 ENCOUNTER — TELEPHONE (OUTPATIENT)
Dept: GASTROENTEROLOGY | Facility: CLINIC | Age: 74
End: 2024-04-23

## 2024-04-23 NOTE — TELEPHONE ENCOUNTER
"  Caller: Leny Neumann \"MEHUL\"    Relationship to patient: Self    Best call back number: [phone]    Patient is needing: PATIENT IS VERY HAPPY WITH THE SAMPLES OF LINZESS THAT SHE RECEIVED AT HER APPT ON 04/12. SHE STATES IT IS WORKING WELL AND SHE WOULD LIKE TO GET A RX SENT TO HER PHARMACY ON FILE. SHE HAS ABOUT 6 DOSES LEFT CURRENTLY. PLEASE REVIEW AND SEND RX TO PHARMACY LISTED BELOW.       [id number] - [place], [place] - [address] AT [facility] [address] - [phone]  - [phone] FX   [address] [facility] [address] [address]   Phone: [phone] Fax: [phone]     "

## 2024-04-24 DIAGNOSIS — K58.1 IRRITABLE BOWEL SYNDROME WITH CONSTIPATION: Primary | ICD-10-CM

## 2024-04-24 NOTE — TELEPHONE ENCOUNTER
Prescription sent per Annika BAKER.        Called pt and on vm advised of the above.   Advised to call with questions.

## 2024-04-29 NOTE — TELEPHONE ENCOUNTER
"  Hub staff attempted to follow warm transfer process and was unsuccessful     Caller: Leny Neumann \"MEHUL\"    Relationship to patient: Self    Best call back number: [phone]    Patient is needing: MISSED CALL FROM [facility]. PLEASE REACH BACK OUT. THANK YOU   "

## 2024-04-29 NOTE — TELEPHONE ENCOUNTER
"  Caller: Leny Neumann \"MEHUL\"    Relationship: Self    Best call back number: [phone]    What medication are you requesting: GENERIC VERSION OF LINZESS OR SOMETHING SIMILAR    If a prescription is needed, what is your preferred pharmacy and phone number:  [facility] #[id number] - [place], [place] - [address]  - [phone]  - [phone]     Additional notes PT STATES SHE CALLED THE PHARMACY AND WAS ADVISED THE LINZESS PRESCRIPTION HAS AN $1100.00 CO-PAY. PT IS REQUESTING A VERSION THAT HAS NO/LITTLE CO-PAY. PLEASE CONTACT PT TO ADVISE/ASSIST.           "

## 2024-04-30 NOTE — TELEPHONE ENCOUNTER
I have called the pt and passed on Annika's recommendations.  She is going to do some research on the medication and let us know.    She really doesn't want to take a liquid form of medication, advised this is the cheapest option for her.  She will call back if she decides to try the medication

## 2024-05-13 ENCOUNTER — OFFICE VISIT (OUTPATIENT)
Dept: FAMILY MEDICINE CLINIC | Facility: CLINIC | Age: 74
End: 2024-05-13
Payer: MEDICARE

## 2024-05-13 VITALS
BODY MASS INDEX: 33.38 KG/M2 | OXYGEN SATURATION: 95 % | HEART RATE: 84 BPM | DIASTOLIC BLOOD PRESSURE: 72 MMHG | SYSTOLIC BLOOD PRESSURE: 108 MMHG | HEIGHT: 60 IN | WEIGHT: 170 LBS

## 2024-05-13 DIAGNOSIS — R73.09 ELEVATED GLUCOSE: ICD-10-CM

## 2024-05-13 DIAGNOSIS — L30.9 DERMATITIS: Primary | ICD-10-CM

## 2024-05-13 PROCEDURE — 99213 OFFICE O/P EST LOW 20 MIN: CPT | Performed by: NURSE PRACTITIONER

## 2024-05-13 PROCEDURE — 1159F MED LIST DOCD IN RCRD: CPT | Performed by: NURSE PRACTITIONER

## 2024-05-13 PROCEDURE — 1126F AMNT PAIN NOTED NONE PRSNT: CPT | Performed by: NURSE PRACTITIONER

## 2024-05-13 PROCEDURE — 1160F RVW MEDS BY RX/DR IN RCRD: CPT | Performed by: NURSE PRACTITIONER

## 2024-05-13 RX ORDER — CLOBETASOL PROPIONATE 0.5 MG/G
1 CREAM TOPICAL 2 TIMES DAILY
Qty: 30 G | Refills: 3 | Status: SHIPPED | OUTPATIENT
Start: 2024-05-13

## 2024-05-13 NOTE — PROGRESS NOTES
Subjective   Leny Neumann is a 73 y.o. female.     History of Present Illness   Established patient with acute concerns.      Acute on chronic dermatitis x years. This is flared up now. She uses Dial and Tide or Earthbreeze detergents. She reports she gets pink raised small blisters, worse on forearms, in between fingers and under bra line and inner thighs. She does have cat and dog, no bugs, no fleas. She does not take antihistamine. She has tried cortisone cream without relief.    Patient with history of prediabetes/DM2 with last A1c 6.5.  She did not want to start on medication but tried dietary changes.  She is working out and eating healthier, has cut out a lot of chunky carbs and sweets from her diet.  She is lost 4 pounds and is working on weight loss.  She would like to stay off medication if possible.    The following portions of the patient's history were reviewed and updated as appropriate: allergies, current medications, past family history, past medical history, past social history, past surgical history and problem list.    Review of Systems      Current Outpatient Medications:     acetaminophen (TYLENOL) 500 MG tablet, Take 1 tablet by mouth Every 6 (Six) Hours As Needed for Mild Pain. As needed, Disp: , Rfl:     omeprazole (priLOSEC) 40 MG capsule, Take 1 capsule by mouth Daily., Disp: 90 capsule, Rfl: 1    pravastatin (PRAVACHOL) 40 MG tablet, Take 1 tablet by mouth Daily., Disp: 90 tablet, Rfl: 3    clobetasol propionate (TEMOVATE) 0.05 % cream, Apply 1 Application topically to the appropriate area as directed 2 (Two) Times a Day. Do not use on face, Disp: 30 g, Rfl: 3    linaclotide (Linzess) 72 MCG capsule capsule, Take 1 capsule by mouth Every Morning Before Breakfast. (Patient not taking: Reported on 5/13/2024), Disp: 90 capsule, Rfl: 1    ondansetron (Zofran) 8 MG tablet, Take 1 tablet by mouth Every 8 (Eight) Hours As Needed for Nausea or Vomiting. (Patient not taking: Reported  on 3/7/2024), Disp: 15 tablet, Rfl: 0    Objective   Physical Exam  Vitals reviewed.   Constitutional:       Appearance: Normal appearance.   HENT:      Mouth/Throat:      Mouth: Mucous membranes are moist.   Cardiovascular:      Rate and Rhythm: Normal rate and regular rhythm.      Pulses: Normal pulses.      Heart sounds: Normal heart sounds.   Skin:     General: Skin is dry.      Findings: Rash present.             Comments: Small pink, rash to chest, under R breast, forearm, inner thighs   Neurological:      General: No focal deficit present.      Mental Status: She is alert.         Vitals:    05/13/24 1530   BP: 108/72   Pulse: 84   SpO2: 95%     Body mass index is 33.2 kg/m².    Procedures    TSH   Date Value Ref Range Status   03/07/2024 3.670 0.450 - 4.500 uIU/mL Final     Hemoglobin A1C   Date Value Ref Range Status   05/13/2024 6.4 (H) 4.8 - 5.6 % Final     Comment:              Prediabetes: 5.7 - 6.4           Diabetes: >6.4           Glycemic control for adults with diabetes: <7.0           Assessment & Plan   Problems Addressed this Visit    None  Visit Diagnoses       Dermatitis    -  Primary    Relevant Medications    clobetasol propionate (TEMOVATE) 0.05 % cream    Elevated glucose        Relevant Orders    Hemoglobin A1c (Completed)    Comprehensive metabolic panel (Completed)          Diagnoses         Codes Comments    Dermatitis    -  Primary ICD-10-CM: L30.9  ICD-9-CM: 692.9     Elevated glucose     ICD-10-CM: R73.09  ICD-9-CM: 790.29           Dermatitis- rx clobetasol use sparingly bid x 1 week  Advised Scent free, dye free detergents, soaps, use dryerballs. No dial soap. Drink plenty of water  Can use sticker lotions like Eucerin or Lubriderm or Aquaphor as emollient for dry skin or patches.  Follow-up with dermatology as needed.  Can take oral antihistamine like Zyrtec or Allegra daily    Elevated blood sugar/prediabetes-check A1c today, encourage ADA diet, limit all alcohol, soda, sweets,  chunky carbohydrates, work on diet, exercise/walking daily and weight loss.  Drink mostly water.  eat more fiber and take fiber supplement daily.           Education provided in AVS   No follow-ups on file.

## 2024-05-14 LAB
ALBUMIN SERPL-MCNC: 4.4 G/DL (ref 3.8–4.8)
ALBUMIN/GLOB SERPL: 1.8 {RATIO} (ref 1.2–2.2)
ALP SERPL-CCNC: 60 IU/L (ref 44–121)
ALT SERPL-CCNC: 20 IU/L (ref 0–32)
AST SERPL-CCNC: 19 IU/L (ref 0–40)
BILIRUB SERPL-MCNC: 0.3 MG/DL (ref 0–1.2)
BUN SERPL-MCNC: 15 MG/DL (ref 8–27)
BUN/CREAT SERPL: 15 (ref 12–28)
CALCIUM SERPL-MCNC: 9.3 MG/DL (ref 8.7–10.3)
CHLORIDE SERPL-SCNC: 104 MMOL/L (ref 96–106)
CO2 SERPL-SCNC: 23 MMOL/L (ref 20–29)
CREAT SERPL-MCNC: 0.97 MG/DL (ref 0.57–1)
EGFRCR SERPLBLD CKD-EPI 2021: 62 ML/MIN/1.73
GLOBULIN SER CALC-MCNC: 2.5 G/DL (ref 1.5–4.5)
GLUCOSE SERPL-MCNC: 117 MG/DL (ref 70–99)
HBA1C MFR BLD: 6.4 % (ref 4.8–5.6)
POTASSIUM SERPL-SCNC: 4.2 MMOL/L (ref 3.5–5.2)
PROT SERPL-MCNC: 6.9 G/DL (ref 6–8.5)
SODIUM SERPL-SCNC: 141 MMOL/L (ref 134–144)

## 2024-05-20 ENCOUNTER — TELEPHONE (OUTPATIENT)
Dept: SLEEP MEDICINE | Facility: HOSPITAL | Age: 74
End: 2024-05-20
Payer: MEDICARE

## 2024-08-02 ENCOUNTER — ANESTHESIA (OUTPATIENT)
Dept: SURGERY | Facility: SURGERY CENTER | Age: 74
End: 2024-08-02
Payer: MEDICARE

## 2024-08-02 ENCOUNTER — HOSPITAL ENCOUNTER (OUTPATIENT)
Facility: SURGERY CENTER | Age: 74
Setting detail: HOSPITAL OUTPATIENT SURGERY
Discharge: HOME | End: 2024-08-02
Attending: INTERNAL MEDICINE | Admitting: INTERNAL MEDICINE
Payer: MEDICARE

## 2024-08-02 ENCOUNTER — ANESTHESIA EVENT (OUTPATIENT)
Dept: SURGERY | Facility: SURGERY CENTER | Age: 74
End: 2024-08-02
Payer: MEDICARE

## 2024-08-02 VITALS
SYSTOLIC BLOOD PRESSURE: 137 MMHG | HEART RATE: 69 BPM | TEMPERATURE: 98 F | RESPIRATION RATE: 16 BRPM | HEIGHT: 60 IN | BODY MASS INDEX: 32.9 KG/M2 | WEIGHT: 167.6 LBS | OXYGEN SATURATION: 96 % | DIASTOLIC BLOOD PRESSURE: 68 MMHG

## 2024-08-02 DIAGNOSIS — R13.10 DYSPHAGIA, UNSPECIFIED TYPE: ICD-10-CM

## 2024-08-02 DIAGNOSIS — Z12.11 ENCOUNTER FOR SCREENING FOR MALIGNANT NEOPLASM OF COLON: ICD-10-CM

## 2024-08-02 PROCEDURE — 43235 EGD DIAGNOSTIC BRUSH WASH: CPT | Performed by: INTERNAL MEDICINE

## 2024-08-02 PROCEDURE — S0260 H&P FOR SURGERY: HCPCS | Performed by: INTERNAL MEDICINE

## 2024-08-02 PROCEDURE — 45385 COLONOSCOPY W/LESION REMOVAL: CPT | Performed by: INTERNAL MEDICINE

## 2024-08-02 PROCEDURE — 25010000002 LIDOCAINE 1 % SOLUTION: Performed by: NURSE ANESTHETIST, CERTIFIED REGISTERED

## 2024-08-02 PROCEDURE — 45380 COLONOSCOPY AND BIOPSY: CPT | Performed by: INTERNAL MEDICINE

## 2024-08-02 PROCEDURE — 25010000002 PROPOFOL 10 MG/ML EMULSION: Performed by: NURSE ANESTHETIST, CERTIFIED REGISTERED

## 2024-08-02 PROCEDURE — 25810000003 LACTATED RINGERS PER 1000 ML: Performed by: INTERNAL MEDICINE

## 2024-08-02 PROCEDURE — 88305 TISSUE EXAM BY PATHOLOGIST: CPT | Performed by: INTERNAL MEDICINE

## 2024-08-02 DEVICE — REPLAY HEMOSTASIS CLIP, 16MM SPAN
Type: IMPLANTABLE DEVICE | Site: COLON | Status: FUNCTIONAL
Brand: REPLAY

## 2024-08-02 RX ORDER — LIDOCAINE HYDROCHLORIDE 10 MG/ML
INJECTION, SOLUTION INFILTRATION; PERINEURAL AS NEEDED
Status: DISCONTINUED | OUTPATIENT
Start: 2024-08-02 | End: 2024-08-02 | Stop reason: SURG

## 2024-08-02 RX ORDER — PROPOFOL 10 MG/ML
VIAL (ML) INTRAVENOUS AS NEEDED
Status: DISCONTINUED | OUTPATIENT
Start: 2024-08-02 | End: 2024-08-02 | Stop reason: SURG

## 2024-08-02 RX ORDER — SODIUM CHLORIDE 0.9 % (FLUSH) 0.9 %
10 SYRINGE (ML) INJECTION AS NEEDED
Status: DISCONTINUED | OUTPATIENT
Start: 2024-08-02 | End: 2024-08-02 | Stop reason: HOSPADM

## 2024-08-02 RX ORDER — SODIUM CHLORIDE, SODIUM LACTATE, POTASSIUM CHLORIDE, CALCIUM CHLORIDE 600; 310; 30; 20 MG/100ML; MG/100ML; MG/100ML; MG/100ML
1000 INJECTION, SOLUTION INTRAVENOUS CONTINUOUS
Status: DISCONTINUED | OUTPATIENT
Start: 2024-08-02 | End: 2024-08-02 | Stop reason: HOSPADM

## 2024-08-02 RX ORDER — LIDOCAINE HYDROCHLORIDE 10 MG/ML
0.5 INJECTION, SOLUTION INFILTRATION; PERINEURAL ONCE AS NEEDED
Status: DISCONTINUED | OUTPATIENT
Start: 2024-08-02 | End: 2024-08-02 | Stop reason: HOSPADM

## 2024-08-02 RX ADMIN — LIDOCAINE HYDROCHLORIDE 60 MG: 10 INJECTION, SOLUTION INFILTRATION; PERINEURAL at 09:37

## 2024-08-02 RX ADMIN — SODIUM CHLORIDE, POTASSIUM CHLORIDE, SODIUM LACTATE AND CALCIUM CHLORIDE: 600; 310; 30; 20 INJECTION, SOLUTION INTRAVENOUS at 09:34

## 2024-08-02 RX ADMIN — PROPOFOL 200 MCG/KG/MIN: 10 INJECTION, EMULSION INTRAVENOUS at 09:38

## 2024-08-02 RX ADMIN — PROPOFOL 150 MG: 10 INJECTION, EMULSION INTRAVENOUS at 09:37

## 2024-08-02 NOTE — ANESTHESIA PREPROCEDURE EVALUATION
Anesthesia Evaluation     Patient summary reviewed and Nursing notes reviewed   history of anesthetic complications:  PONV  NPO Solid Status: > 6 hours  NPO Liquid Status: > 2 hours           Airway   Mallampati: II  TM distance: >3 FB  Neck ROM: full  Anterior  Dental - normal exam   (+) implants        Pulmonary    (-) COPD, asthma, not a smoker  Cardiovascular   Exercise tolerance: good (4-7 METS)    (+) hyperlipidemia  (-) past MI, dysrhythmias, angina      Neuro/Psych  (+) headaches  (-) seizures, CVA  GI/Hepatic/Renal/Endo    (+) obesity, hiatal hernia, GERD, thyroid problem hypothyroidism  (-) liver disease, no renal disease, diabetes    Musculoskeletal     (+) neck pain  Abdominal    Substance History      OB/GYN          Other   arthritis,                       Anesthesia Plan    ASA 2     MAC       Anesthetic plan, risks, benefits, and alternatives have been provided, discussed and informed consent has been obtained with: patient.        CODE STATUS:

## 2024-08-02 NOTE — ANESTHESIA POSTPROCEDURE EVALUATION
Patient: Leny Neumann    Procedure Summary       Date: 08/02/24 Room / Location: [facility] / [facility]    Anesthesia Start: 0934 Anesthesia Stop: 1015    Procedures:       COLONOSCOPY      ESOPHAGOGASTRODUODENOSCOPY (Esophagus) Diagnosis:       Encounter for screening for malignant neoplasm of colon      Dysphagia, unspecified type      (Encounter for screening for malignant neoplasm of colon [Z12.11])      (Dysphagia, unspecified type [R13.10])    Surgeons: Stephany Akbar MD Provider: Canelo Zazueta MD    Anesthesia Type: MAC ASA Status: 2            Anesthesia Type: MAC    Vitals  Vitals Value Taken Time   /61 08/02/24 1045   Temp 36.7 °C (98 °F) 08/02/24 1016   Pulse 61 08/02/24 1035   Resp 16 08/02/24 1016   SpO2 97 % 08/02/24 1035   Vitals shown include unfiled device data.        Post Anesthesia Care and Evaluation    Patient location during evaluation: PHASE II  Patient participation: complete - patient participated  Level of consciousness: awake  Pain management: satisfactory to patient    Airway patency: patent  Anesthetic complications: No anesthetic complications  PONV Status: controlled  Cardiovascular status: acceptable  Respiratory status: acceptable  Hydration status: acceptable

## 2024-08-02 NOTE — H&P
[facility]  Pre Procedure History & Physical    Chief Complaint:   Gerd, dysphagia, screening    Subjective     HPI:   73 yo here today for egd/colonoscopy.  Pt reports no FH CRC/polyps.  Patient reports well controlled gerd but has developed dysphagia with pills.  Last exam .  She has a h/o polyps    Past Medical History:   Past Medical History:   Diagnosis Date    Allergic     Arthritis     Colon polyp     followed by GI, Dr. Akbar    Elevated serum creatinine     GERD (gastroesophageal reflux disease)     Headache     Hiatal hernia     HL (hearing loss)     Hypercalcemia     Hyperlipidemia     Hyperparathyroidism     Hypothyroidism     transient    Kidney cysts     Menopausal hot flushes     Neck pain     Osteopenia     Parathyroid tumor     PONV (postoperative nausea and vomiting)     Tinea pedis        Past Surgical History:  Past Surgical History:   Procedure Laterality Date    COLONOSCOPY N/A 2018    Procedure: COLONOSCOPY into cecum with polypectomy;  Surgeon: Stephany Akbar MD;  Location: [facility] ENDOSCOPY;  Service: Gastroenterology    PARATHYROIDECTOMY N/A 2023    Procedure: PARATHYROIDECTOMY;  Surgeon: Van Cobb MD;  Location: [facility] MAIN OR;  Service: ENT;  Laterality: N/A;       Family History:  Family History   Problem Relation Age of Onset    Breast cancer Mother 64    Early death Mother         Mother  of breast cancer at 68    Lung cancer Father 64    Heart failure Father     Heart attack Father     Early death Father         Father  of lung cancer and heart disease at 64    Hearing loss Father         Worked in area with huge generator without ear protection    Heart disease Father     Alcohol abuse Daughter     Early death Maternal Grandfather 35        farm accident    Ovarian cancer Neg Hx     Uterine cancer Neg Hx     Colon cancer Neg Hx     Deep vein thrombosis Neg Hx     Pulmonary embolism Neg Hx     Malig Hyperthermia Neg Hx   "      Social History:   reports that she quit smoking about 22 years ago. Her smoking use included cigarettes. She started smoking about 59 years ago. She has a 36.7 pack-year smoking history. She has never used smokeless tobacco. She reports that she does not currently use alcohol. She reports that she does not use drugs.    Medications:   Medications Prior to Admission   Medication Sig Dispense Refill Last Dose    acetaminophen (TYLENOL) 500 MG tablet Take 1 tablet by mouth Every 6 (Six) Hours As Needed for Mild Pain. As needed   Past Month    clobetasol propionate (TEMOVATE) 0.05 % cream Apply 1 Application topically to the appropriate area as directed 2 (Two) Times a Day. Do not use on face 30 g 3 8/2/2024    omeprazole (priLOSEC) 40 MG capsule Take 1 capsule by mouth Daily. 90 capsule 1 Past Week    pravastatin (PRAVACHOL) 40 MG tablet Take 1 tablet by mouth Daily. 90 tablet 3 Past Week    ondansetron (Zofran) 8 MG tablet Take 1 tablet by mouth Every 8 (Eight) Hours As Needed for Nausea or Vomiting. (Patient not taking: Reported on 3/7/2024) 15 tablet 0 More than a month       Allergies:  Patient has no known allergies.    ROS:    Pertinent items are noted in HPI     Objective     Blood pressure 137/82, pulse 81, temperature 97.5 °F (36.4 °C), temperature source Temporal, resp. rate 16, height 152.4 cm (60\"), weight 76 kg (167 lb 9.6 oz), SpO2 96%, not currently breastfeeding.    Physical Exam   Constitutional: Pt is oriented to person, place, and time and well-developed, well-nourished, and in no distress.   Mouth/Throat: Oropharynx is clear and moist.   Neck: Normal range of motion.   Cardiovascular: Normal rate, regular rhythm    Pulmonary/Chest: Effort normal    Abdominal: Soft. Nontender  Skin: Skin is warm and dry.   Psychiatric: Mood, memory, affect and judgment normal.     Assessment & Plan     Diagnosis:  Gerd, dysphagia, screening    Anticipated Surgical Procedure:  Egd/colonoscopy    The risks, " benefits, and alternatives of this procedure have been discussed with the patient or the responsible party- the patient understands and agrees to proceed.

## 2024-08-05 LAB
LAB AP CASE REPORT: NORMAL
PATH REPORT.FINAL DX SPEC: NORMAL
PATH REPORT.GROSS SPEC: NORMAL

## 2024-08-06 DIAGNOSIS — R13.10 DYSPHAGIA, UNSPECIFIED TYPE: ICD-10-CM

## 2024-08-06 RX ORDER — OMEPRAZOLE 40 MG/1
40 CAPSULE, DELAYED RELEASE ORAL DAILY
Qty: 90 CAPSULE | Refills: 1 | Status: SHIPPED | OUTPATIENT
Start: 2024-08-06

## 2024-08-12 ENCOUNTER — TELEPHONE (OUTPATIENT)
Dept: GASTROENTEROLOGY | Facility: CLINIC | Age: 74
End: 2024-08-12
Payer: MEDICARE

## 2024-08-12 NOTE — TELEPHONE ENCOUNTER
The polyp(s) biopsies showed adenomatous change. This is not cancerous but is considered potentially precancerous. Follow-up colonoscopy in 3 years is advised.   Written by Stephany Akbar MD on 8/12/2024  2:37 PM EDT  Seen by patient MEHUL Neumann on 8/12/2024  2:41 PM    C/s placed in recall and  for 08/02/2027.

## 2024-08-13 ENCOUNTER — TELEPHONE (OUTPATIENT)
Dept: FAMILY MEDICINE CLINIC | Facility: CLINIC | Age: 74
End: 2024-08-13

## 2024-08-13 NOTE — TELEPHONE ENCOUNTER
"Caller: Leny Neumann \"MEHUL\"    Relationship: Self    Best call back number: [phone] (Mobile)     What orders are you requesting (i.e. lab or imaging): LUNG NODULE SCREENING.    In what timeframe would the patient need to come in: ASAP    Where will you receive your lab/imaging services:     Additional notes: PLEASE CONTACT PATIENT TO ADVISE.          THANKS  "

## 2024-08-15 DIAGNOSIS — Z87.891 FORMER SMOKER: ICD-10-CM

## 2024-08-15 DIAGNOSIS — Z12.2 SCREENING FOR LUNG CANCER: Primary | ICD-10-CM

## 2024-09-27 ENCOUNTER — HOSPITAL ENCOUNTER (OUTPATIENT)
Dept: PET IMAGING | Facility: HOSPITAL | Age: 74
Discharge: HOME | End: 2024-09-27
Payer: MEDICARE

## 2024-09-27 DIAGNOSIS — Z87.891 FORMER SMOKER: ICD-10-CM

## 2024-09-27 PROCEDURE — 71271 CT THORAX LUNG CANCER SCR C-: CPT

## 2024-10-15 DIAGNOSIS — L60.0 INGROWN TOENAIL: Primary | ICD-10-CM

## 2024-11-18 ENCOUNTER — TELEPHONE (OUTPATIENT)
Dept: FAMILY MEDICINE CLINIC | Facility: CLINIC | Age: 74
End: 2024-11-18

## 2024-11-18 NOTE — TELEPHONE ENCOUNTER
"Caller: Leny Neumann \"MEHUL\"    Relationship: Self    Best call back number: [phone]     What medication are you requesting: ANOTHER STATIN TO TAKE FOR HIGH CHOLESTEROL    What are your current symptoms: HIGH CHOLESTEROL     How long have you been experiencing symptoms: YES    Have you had these symptoms before:    [x] Yes  [] No    Have you been treated for these symptoms before:   [x] Yes  [] No    If a prescription is needed, what is your preferred pharmacy and phone number: [facility] #[id number] - [place], [place] - [address]  [address] [facility] [address] - [phone]  - [phone]     Additional notes: PATIENT IS REQUESTING A PHONE CALL TO DISCUSS STARTING ON A DIFFERENT MEDICATION FOR HER CHOLESTEROL. SHE HAS STOPPED TAKING THE   pravastatin (PRAVACHOL) 40 MG tablet AS IT IS CAUSING HER TOO MANY SIDE EFFECTS AND SHE JUST FEELS MISERABLE ALL THE TIME.     PLEASE CALL TO DISCUSS BEFORE SENDING IN A NEW MEDICATION.     "

## 2024-11-27 RX ORDER — EZETIMIBE 10 MG/1
10 TABLET ORAL DAILY
Qty: 30 TABLET | Refills: 3 | Status: SHIPPED | OUTPATIENT
Start: 2024-11-27

## 2024-12-03 ENCOUNTER — TELEPHONE (OUTPATIENT)
Dept: FAMILY MEDICINE CLINIC | Facility: CLINIC | Age: 74
End: 2024-12-03

## 2025-03-13 ENCOUNTER — OFFICE VISIT (OUTPATIENT)
Dept: FAMILY MEDICINE CLINIC | Facility: CLINIC | Age: 75
End: 2025-03-13
Payer: MEDICARE

## 2025-03-13 VITALS
WEIGHT: 174 LBS | BODY MASS INDEX: 34.16 KG/M2 | OXYGEN SATURATION: 92 % | SYSTOLIC BLOOD PRESSURE: 148 MMHG | HEART RATE: 80 BPM | DIASTOLIC BLOOD PRESSURE: 92 MMHG | RESPIRATION RATE: 18 BRPM | HEIGHT: 60 IN

## 2025-03-13 DIAGNOSIS — E66.811 CLASS 1 OBESITY DUE TO EXCESS CALORIES WITH SERIOUS COMORBIDITY AND BODY MASS INDEX (BMI) OF 33.0 TO 33.9 IN ADULT: ICD-10-CM

## 2025-03-13 DIAGNOSIS — R79.9 ABNORMAL FINDING OF BLOOD CHEMISTRY, UNSPECIFIED: ICD-10-CM

## 2025-03-13 DIAGNOSIS — Z12.31 BREAST CANCER SCREENING BY MAMMOGRAM: ICD-10-CM

## 2025-03-13 DIAGNOSIS — E78.2 MIXED HYPERLIPIDEMIA: ICD-10-CM

## 2025-03-13 DIAGNOSIS — E66.09 CLASS 1 OBESITY DUE TO EXCESS CALORIES WITH SERIOUS COMORBIDITY AND BODY MASS INDEX (BMI) OF 33.0 TO 33.9 IN ADULT: ICD-10-CM

## 2025-03-13 DIAGNOSIS — Z78.0 POST-MENOPAUSAL: ICD-10-CM

## 2025-03-13 DIAGNOSIS — Z00.00 ENCOUNTER FOR SUBSEQUENT ANNUAL WELLNESS VISIT (AWV) IN MEDICARE PATIENT: Primary | ICD-10-CM

## 2025-03-13 LAB
ALBUMIN SERPL-MCNC: 4.2 G/DL (ref 3.5–5.2)
ALBUMIN/GLOB SERPL: 1.6 G/DL
ALP SERPL-CCNC: 63 U/L (ref 39–117)
ALT SERPL-CCNC: 20 U/L (ref 1–33)
AST SERPL-CCNC: 20 U/L (ref 1–32)
BILIRUB SERPL-MCNC: 0.3 MG/DL (ref 0–1.2)
BUN SERPL-MCNC: 12 MG/DL (ref 8–23)
BUN/CREAT SERPL: 13.3 (ref 7–25)
CALCIUM SERPL-MCNC: 9.1 MG/DL (ref 8.6–10.5)
CHLORIDE SERPL-SCNC: 106 MMOL/L (ref 98–107)
CHOLEST SERPL-MCNC: 273 MG/DL (ref 0–200)
CHOLEST/HDLC SERPL: 6.5 {RATIO}
CO2 SERPL-SCNC: 26.5 MMOL/L (ref 22–29)
CREAT SERPL-MCNC: 0.9 MG/DL (ref 0.57–1)
EGFRCR SERPLBLD CKD-EPI 2021: 68.1 ML/MIN/1.73
GLOBULIN SER CALC-MCNC: 2.6 GM/DL
GLUCOSE SERPL-MCNC: 112 MG/DL (ref 65–99)
HBA1C MFR BLD: 6.2 % (ref 4.8–5.6)
HDLC SERPL-MCNC: 42 MG/DL (ref 40–60)
LDLC SERPL CALC-MCNC: 204 MG/DL (ref 0–100)
POTASSIUM SERPL-SCNC: 4.8 MMOL/L (ref 3.5–5.2)
PROT SERPL-MCNC: 6.8 G/DL (ref 6–8.5)
SODIUM SERPL-SCNC: 142 MMOL/L (ref 136–145)
T4 FREE SERPL-MCNC: 0.9 NG/DL (ref 0.92–1.68)
TRIGL SERPL-MCNC: 145 MG/DL (ref 0–150)
TSH SERPL DL<=0.005 MIU/L-ACNC: 4.93 UIU/ML (ref 0.27–4.2)
VLDLC SERPL CALC-MCNC: 27 MG/DL (ref 5–40)

## 2025-03-13 NOTE — ASSESSMENT & PLAN NOTE
Patient's (Body mass index is 33.98 kg/m².) indicates that they are obese (BMI >30) with health conditions that include dyslipidemias . Weight is worsening. BMI  is above average; BMI management plan is completed. We discussed portion control, increasing exercise, joining a fitness center or start home based exercise program, and pharmacologic options including GLP 1 meds.    Reviewed s/e GLP 1 including BBW medullary thyroid cancer. She has no personal history of medullary thyroid cancer or pancreatitis. Reviewed side effect profile and risks.   Orders:    Comprehensive Metabolic Panel    Lipid Panel With / Chol / HDL Ratio    Hemoglobin A1c    TSH    T4, Free

## 2025-03-13 NOTE — PROGRESS NOTES
Subjective   The ABCs of the Annual Wellness Visit  Medicare Wellness Visit      Leny Neumann is a 74 y.o. patient who presents for a Medicare Wellness Visit.    The following portions of the patient's history were reviewed and   updated as appropriate: allergies, current medications, past family history, past medical history, past social history, past surgical history, and problem list.    Compared to one year ago, the patient's physical   health is the same.  Compared to one year ago, the patient's mental   health is the same.    Recent Hospitalizations:  She was not admitted to the hospital during the last year.     Current Medical Providers:  Patient Care Team:  Shellie Gamez APRN as PCP - General (Family Medicine)  Deedee, Morgan Dias MD as Consulting Physician (Otolaryngology)  Sharon Parker MD as Consulting Physician (Cardiology)    Outpatient Medications Prior to Visit   Medication Sig Dispense Refill    acetaminophen (TYLENOL) 500 MG tablet Take 1 tablet by mouth Every 6 (Six) Hours As Needed for Mild Pain. As needed      clobetasol propionate (TEMOVATE) 0.05 % cream Apply 1 Application topically to the appropriate area as directed 2 (Two) Times a Day. Do not use on face 30 g 3    ezetimibe (Zetia) 10 MG tablet Take 1 tablet by mouth Daily. 30 tablet 3    omeprazole (priLOSEC) 40 MG capsule TAKE 1 CAPSULE BY MOUTH DAILY 90 capsule 1    pravastatin (PRAVACHOL) 40 MG tablet Take 1 tablet by mouth Daily. (Patient not taking: Reported on 3/13/2025) 90 tablet 3    ondansetron (Zofran) 8 MG tablet Take 1 tablet by mouth Every 8 (Eight) Hours As Needed for Nausea or Vomiting. (Patient not taking: Reported on 3/13/2025) 15 tablet 0     No facility-administered medications prior to visit.     No opioid medication identified on active medication list. I have reviewed chart for other potential  high risk medication/s and harmful drug interactions in the elderly.      Aspirin is not on active  "medication list.  Aspirin use is not indicated based on review of current medical condition/s. Risk of harm outweighs potential benefits.  .    Patient Active Problem List   Diagnosis    Hyperlipidemia    Osteopenia    Colon polyp    Allergic    Hypothyroidism    Tinnitus of both ears    Onychomycosis    Other fatigue    Tinea pedis    Menopausal hot flushes    Screening for colon cancer    Occipital neuralgia of right side    Numbness and tingling of both feet    New onset of headaches after age 50    Class 1 obesity due to excess calories with serious comorbidity and body mass index (BMI) of 33.0 to 33.9 in adult    Encounter for screening for malignant neoplasm of colon    Dysphagia     Advance Care Planning {Advance Care Planning Hyperlink:23}Advance Directive is not on file.  ACP discussion was declined by the patient. Patient does not have an advance directive, information provided.            Objective   Vitals:    03/13/25 0955   BP: 148/92   Pulse: 80   Resp: 18   SpO2: 92%   Weight: 78.9 kg (174 lb)   Height: 152.4 cm (60\")   PainSc: 2        Estimated body mass index is 33.98 kg/m² as calculated from the following:    Height as of this encounter: 152.4 cm (60\").    Weight as of this encounter: 78.9 kg (174 lb).    BMI is >= 30 and <35. (Class 1 Obesity). The following options were offered after discussion;: weight loss educational material (shared in after visit summary), exercise counseling/recommendations, nutrition counseling/recommendations, and pharmacological intervention options    {Help Text;  If you change Medicare Wellness reason for visit to a Welcome to Medicare reason for visit after the encounter has started, please add SmartPhrase .[email or web address] to your note for proper gait and balance documentation. This text will disappear after the note is signed:[id number]}       Does the patient have evidence of cognitive impairment? No  Lab Results   Component Value Date    CHLPL 273 (H) 03/13/2025    TRIG " 145 2025    HDL 42 2025     (H) 2025    VLDL 27 2025    HGBA1C 6.20 (H) 2025                                                                                               Health  Risk Assessment    Smoking Status:  Social History     Tobacco Use   Smoking Status Former    Current packs/day: 0.00    Average packs/day: 1 pack/day for 36.7 years (36.7 ttl pk-yrs)    Types: Cigarettes    Start date: 1965    Quit date: 2001    Years since quittin.5   Smokeless Tobacco Never   Tobacco Comments    smoked 1 ppd for 20 yr, quit in , then relapsed for a year in      Alcohol Consumption:  Social History     Substance and Sexual Activity   Alcohol Use Not Currently       Fall Risk Screen{Jump to Steadi Fall Risk Flowsheet:23}  STEADI Fall Risk Assessment was completed, and patient is at LOW risk for falls.Assessment completed on:3/13/2025    Depression Screening{Jump to PHQ SmartForm:23}   Little interest or pleasure in doing things? Not at all   Feeling down, depressed, or hopeless? Not at all   PHQ-2 Total Score 0      Health Habits and Functional and Cognitive Screening:      3/6/2025     9:23 AM   Functional & Cognitive Status   Do you have difficulty preparing food and eating? No   Do you have difficulty bathing yourself, getting dressed or grooming yourself? No   Do you have difficulty using the toilet? No   Do you have difficulty moving around from place to place? No   Do you have trouble with steps or getting out of a bed or a chair? No   Current Diet Unhealthy Diet   Dental Exam Up to date   Eye Exam Up to date   Exercise (times per week) 2 times per week   Current Exercises Include Other   Do you need help using the phone?  No   Are you deaf or do you have serious difficulty hearing?  No   Do you need help to go to places out of walking distance? No   Do you need help shopping? No   Do you need help preparing meals?  No   Do you need help with housework?  No    Do you need help with laundry? No   Do you need help taking your medications? No   Do you need help managing money? No   Do you ever drive or ride in a car without wearing a seat belt? No   Have you felt unusual stress, anger or loneliness in the last month? No   Who do you live with? Alone   If you need help, do you have trouble finding someone available to you? No   Have you been bothered in the last four weeks by sexual problems? No   Do you have difficulty concentrating, remembering or making decisions? No           Age-appropriate Screening Schedule:  Refer to the list below for future screening recommendations based on patient's age, sex and/or medical conditions. Orders for these recommended tests are listed in the plan section. The patient has been provided with a written plan.    Health Maintenance List  Health Maintenance   Topic Date Due    TDAP/TD VACCINES (3 - Td or Tdap) 08/19/2022    DXA SCAN  07/07/2024    MAMMOGRAM  04/19/2025    INFLUENZA VACCINE  03/31/2025 (Originally 7/1/2024)    COVID-19 Vaccine (7 - 2024-25 season) 09/13/2025    ANNUAL WELLNESS VISIT  03/13/2026    LIPID PANEL  03/13/2026    BMI FOLLOWUP  03/14/2026    COLORECTAL CANCER SCREENING  08/02/2027    HEPATITIS C SCREENING  Completed    Pneumococcal Vaccine 50+  Completed    ZOSTER VACCINE  Completed    PAP SMEAR  Discontinued    LUNG CANCER SCREENING  Discontinued                                                                                                                                                CMS Preventative Services Quick Reference  Risk Factors Identified During Encounter  Fall Risk-High or Moderate: Discussed Fall Prevention in the home  Immunizations Discussed/Encouraged: Tdap, Influenza, and COVID19  Dental Screening Recommended  Vision Screening Recommended    The above risks/problems have been discussed with the patient.  Pertinent information has been shared with the patient in the After Visit Summary.  An  After Visit Summary and PPPS were made available to the patient.    Follow Up:{Wrapup  Review (Popup)  Advance Care Planning  Labs  CC  Problem List  Visit Diagnosis  Medications  Result Review  Imaging  [facility]  SnapShot  Encounters  Notes  Media  Procedures :23}   Next Medicare Wellness visit to be scheduled in 1 year.     Assessment & Plan  Encounter for subsequent annual wellness visit (AWV) in Medicare patient         Class 1 obesity due to excess calories with serious comorbidity and body mass index (BMI) of 33.0 to 33.9 in adult  Patient's (Body mass index is 33.98 kg/m².) indicates that they are obese (BMI >30) with health conditions that include dyslipidemias . Weight is worsening. BMI  is above average; BMI management plan is completed. We discussed portion control, increasing exercise, joining a fitness center or start home based exercise program, and pharmacologic options including GLP 1 meds .    Reviewed s/e GLP 1 including BBW medullary thyroid cancer. She has no personal history of medullary thyroid cancer or pancreatitis. Reviewed side effect profile and risks.   Orders:    Comprehensive Metabolic Panel    Lipid Panel With / Chol / HDL Ratio    Hemoglobin A1c    TSH    T4, Free    Mixed hyperlipidemia       Orders:    Comprehensive Metabolic Panel    Lipid Panel With / Chol / HDL Ratio    Hemoglobin A1c    TSH    T4, Free    Breast cancer screening by mammogram    Orders:    Mammo Screening Digital Tomosynthesis Bilateral With CAD; Future    Post-menopausal    Orders:    DEXA Bone Density Axial; Future    Abnormal finding of blood chemistry, unspecified    Orders:    Hemoglobin A1c       Orders Placed This Encounter   Procedures    Mammo Screening Digital Tomosynthesis Bilateral With CAD     Standing Status:   Future     Expected Date:   3/18/2025     Expiration Date:   3/13/2026     Scheduling Instructions:      [facility], please schedule w [facility] if possible     Reason for Exam::   breast cancer screen     Release to patient:   Routine Release [[id number]]    DEXA Bone Density Axial     Standing Status:   Future     Expected Date:   3/16/2025     Expiration Date:   6/13/2026     Scheduling Instructions:      [facility] if possible     Is patient taking or have taken long term Glucocorticoid (steroids)?:   No     Does the patient have rheumatoid arthritis?:   No     Does the patient have secondary osteoporosis?:   No     Reason for Exam::   post sridhar     Release to patient:   Routine Release [[id number]]    COVID-19 (Pfizer) 12yrs+ (COMIRNATY)    Comprehensive Metabolic Panel     Release to patient:   Routine Release [[id number]]     [facility] Has the patient fasted?:   Yes    Lipid Panel With / Chol / HDL Ratio     Release to patient:   Routine Release [[id number]]     [facility] Has the patient fasted?:   Yes    Hemoglobin A1c     Release to patient:   Routine Release [[id number]]     [facility] Has the patient fasted?:   Yes    TSH     Release to patient:   Routine Release [[id number]]     [facility] Has the patient fasted?:   Yes    T4, Free     Release to patient:   Routine Release [[id number]]     [facility] Has the patient fasted?:   Yes         Follow Up:{Wrapup  Review (Popup)  Advance Care Planning  Labs  CC  Problem List  Visit Diagnosis  Medications  Result Review  Imaging  Health Maintenance  Quality  BestPractice  SmartSets  SnapShot  Encounters  Notes  Media  Procedures :23}   Return in about 4 weeks (around 4/10/2025) for Recheck.

## 2025-03-13 NOTE — ASSESSMENT & PLAN NOTE
Orders:    Comprehensive Metabolic Panel    Lipid Panel With / Chol / HDL Ratio    Hemoglobin A1c    TSH    T4, Free

## 2025-03-15 NOTE — PROGRESS NOTES
Subjective   The ABCs of the Annual Wellness Visit  Medicare Wellness Visit      Leny Neumann is a 74 y.o. patient who presents for a Medicare Wellness Visit.    The following portions of the patient's history were reviewed and   updated as appropriate: allergies, current medications, past family history, past medical history, past social history, past surgical history, and problem list.    Compared to one year ago, the patient's physical   health is the same.  Compared to one year ago, the patient's mental   health is the same.    Recent Hospitalizations:  She was not admitted to the hospital during the last year.     Current Medical Providers:  Patient Care Team:  Shellie Gamez APRN as PCP - General (Family Medicine)  Deedee, Morgan Dias MD as Consulting Physician (Otolaryngology)  Sharon Parker MD as Consulting Physician (Cardiology)    Outpatient Medications Prior to Visit   Medication Sig Dispense Refill    acetaminophen (TYLENOL) 500 MG tablet Take 1 tablet by mouth Every 6 (Six) Hours As Needed for Mild Pain. As needed      clobetasol propionate (TEMOVATE) 0.05 % cream Apply 1 Application topically to the appropriate area as directed 2 (Two) Times a Day. Do not use on face 30 g 3    ezetimibe (Zetia) 10 MG tablet Take 1 tablet by mouth Daily. 30 tablet 3    omeprazole (priLOSEC) 40 MG capsule TAKE 1 CAPSULE BY MOUTH DAILY 90 capsule 1    pravastatin (PRAVACHOL) 40 MG tablet Take 1 tablet by mouth Daily. (Patient not taking: Reported on 3/13/2025) 90 tablet 3    ondansetron (Zofran) 8 MG tablet Take 1 tablet by mouth Every 8 (Eight) Hours As Needed for Nausea or Vomiting. (Patient not taking: Reported on 3/13/2025) 15 tablet 0     No facility-administered medications prior to visit.     No opioid medication identified on active medication list. I have reviewed chart for other potential  high risk medication/s and harmful drug interactions in the elderly.      Aspirin is not on active  "medication list.  Aspirin use is not indicated based on review of current medical condition/s. Risk of harm outweighs potential benefits.  .    Patient Active Problem List   Diagnosis    Hyperlipidemia    Osteopenia    Colon polyp    Allergic    Hypothyroidism    Tinnitus of both ears    Onychomycosis    Other fatigue    Tinea pedis    Menopausal hot flushes    Screening for colon cancer    Occipital neuralgia of right side    Numbness and tingling of both feet    New onset of headaches after age 50    Class 1 obesity due to excess calories with serious comorbidity and body mass index (BMI) of 33.0 to 33.9 in adult    Encounter for screening for malignant neoplasm of colon    Dysphagia     Advance Care Planning Advance Directive is not on file.  ACP discussion was held with the patient during this visit. Patient has an advance directive (not in EMR), copy requested.            Objective   Vitals:    25 0955   BP: 148/92   Pulse: 80   Resp: 18   SpO2: 92%   Weight: 78.9 kg (174 lb)   Height: 152.4 cm (60\")   PainSc: 2        Estimated body mass index is 33.98 kg/m² as calculated from the following:    Height as of this encounter: 152.4 cm (60\").    Weight as of this encounter: 78.9 kg (174 lb).                Does the patient have evidence of cognitive impairment? No  Lab Results   Component Value Date    CHLPL 273 (H) 2025    TRIG 145 2025    HDL 42 2025     (H) 2025    VLDL 27 2025    HGBA1C 6.20 (H) 2025                                                                                                Health  Risk Assessment    Smoking Status:  Social History     Tobacco Use   Smoking Status Former    Current packs/day: 0.00    Average packs/day: 1 pack/day for 36.7 years (36.7 ttl pk-yrs)    Types: Cigarettes    Start date: 1965    Quit date: 2001    Years since quittin.5   Smokeless Tobacco Never   Tobacco Comments    smoked 1 ppd for 20 yr, quit in " 2000, then relapsed for a year in 2011     Alcohol Consumption:  Social History     Substance and Sexual Activity   Alcohol Use Not Currently       Fall Risk Screen  BASSEM Fall Risk Assessment was completed, and patient is at LOW risk for falls.Assessment completed on:3/13/2025    Depression Screening   Little interest or pleasure in doing things? Not at all   Feeling down, depressed, or hopeless? Not at all   PHQ-2 Total Score 0      Health Habits and Functional and Cognitive Screening:      3/6/2025     9:23 AM   Functional & Cognitive Status   Do you have difficulty preparing food and eating? No   Do you have difficulty bathing yourself, getting dressed or grooming yourself? No   Do you have difficulty using the toilet? No   Do you have difficulty moving around from place to place? No   Do you have trouble with steps or getting out of a bed or a chair? No   Current Diet Unhealthy Diet   Dental Exam Up to date   Eye Exam Up to date   Exercise (times per week) 2 times per week   Current Exercises Include Other   Do you need help using the phone?  No   Are you deaf or do you have serious difficulty hearing?  No   Do you need help to go to places out of walking distance? No   Do you need help shopping? No   Do you need help preparing meals?  No   Do you need help with housework?  No   Do you need help with laundry? No   Do you need help taking your medications? No   Do you need help managing money? No   Do you ever drive or ride in a car without wearing a seat belt? No   Have you felt unusual stress, anger or loneliness in the last month? No   Who do you live with? Alone   If you need help, do you have trouble finding someone available to you? No   Have you been bothered in the last four weeks by sexual problems? No   Do you have difficulty concentrating, remembering or making decisions? No           Age-appropriate Screening Schedule:  Refer to the list below for future screening recommendations based on patient's  age, sex and/or medical conditions. Orders for these recommended tests are listed in the plan section. The patient has been provided with a written plan.    Health Maintenance List  Health Maintenance   Topic Date Due    TDAP/TD VACCINES (3 - Td or Tdap) 08/19/2022    DXA SCAN  07/07/2024    MAMMOGRAM  04/19/2025    INFLUENZA VACCINE  03/31/2025 (Originally 7/1/2024)    COVID-19 Vaccine (7 - 2024-25 season) 09/13/2025    ANNUAL WELLNESS VISIT  03/13/2026    LIPID PANEL  03/13/2026    BMI FOLLOWUP  03/14/2026    COLORECTAL CANCER SCREENING  08/02/2027    HEPATITIS C SCREENING  Completed    Pneumococcal Vaccine 50+  Completed    ZOSTER VACCINE  Completed    PAP SMEAR  Discontinued    LUNG CANCER SCREENING  Discontinued                                                                                                                                                CMS Preventative Services Quick Reference  Risk Factors Identified During Encounter  Fall Risk-High or Moderate: Discussed Fall Prevention in the home  Immunizations Discussed/Encouraged: Tdap  Dental Screening Recommended  Vision Screening Recommended    The above risks/problems have been discussed with the patient.  Pertinent information has been shared with the patient in the After Visit Summary.  An After Visit Summary and PPPS were made available to the patient.    Follow Up:   Next Medicare Wellness visit to be scheduled in 1 year.         Additional E&M Note during same encounter follows:  Patient has additional, significant, and separately identifiable condition(s)/problem(s) that require work above and beyond the Medicare Wellness Visit     Chief Complaint  Annual Exam    Subjective   HPI  MEHUL is also being seen today for additional medical problem/s.    Review of Systems   Constitutional: Negative.  Negative for activity change, fatigue and fever.   HENT: Negative.  Negative for congestion.    Respiratory:  Negative for cough, shortness of breath and  "wheezing.    Cardiovascular:  Negative for chest pain, palpitations and leg swelling.   Gastrointestinal:  Negative for constipation, diarrhea, nausea and vomiting.   Endocrine: Negative for polydipsia, polyphagia and polyuria.   Genitourinary:  Negative for dysuria and hematuria.   Musculoskeletal:  Negative for arthralgias and back pain.   Skin:  Negative for wound.   Allergic/Immunologic: Negative for environmental allergies.   Neurological:  Negative for seizures and syncope.   Hematological:  Does not bruise/bleed easily.   Psychiatric/Behavioral:  Negative for dysphoric mood and sleep disturbance.         Chronic obesity x years. BMI 33. Has tried Isa Salvatore other diets and has positive results but gains weight back. Would like to discuss GLP 1 meds.       Objective   Vital Signs:  /92   Pulse 80   Resp 18   Ht 152.4 cm (60\")   Wt 78.9 kg (174 lb)   SpO2 92%   BMI 33.98 kg/m²   Physical Exam  Vitals reviewed.   Constitutional:       Appearance: Normal appearance. She is obese.   HENT:      Right Ear: Tympanic membrane normal.      Left Ear: Tympanic membrane normal.      Mouth/Throat:      Mouth: Mucous membranes are moist.   Eyes:      Pupils: Pupils are equal, round, and reactive to light.   Cardiovascular:      Pulses: Normal pulses.      Heart sounds: Normal heart sounds.   Pulmonary:      Effort: Pulmonary effort is normal.   Abdominal:      General: Bowel sounds are normal.   Musculoskeletal:         General: Normal range of motion.   Skin:     General: Skin is warm.      Capillary Refill: Capillary refill takes less than 2 seconds.   Neurological:      Mental Status: She is alert.   Psychiatric:         Mood and Affect: Mood normal.                    Assessment and Plan      Encounter for subsequent annual wellness visit (AWV) in Medicare patient         Class 1 obesity due to excess calories with serious comorbidity and body mass index (BMI) of 33.0 to 33.9 in adult  Patient's (Body mass " index is 33.98 kg/m².) indicates that they are obese (BMI >30) with health conditions that include dyslipidemias and GERD . Weight is unchanged. BMI  is above average; BMI management plan is completed. We discussed portion control, increasing exercise, and pharmacologic options including [id number] .    Reviewed s/e GLP 1 including BBW medullary thyroid cancer. She has no personal history of medullary thyroid cancer or pancreatitis. Reviewed side effect profile and risks.   Orders:    Comprehensive Metabolic Panel    Lipid Panel With / Chol / HDL Ratio    Hemoglobin A1c    TSH    T4, Free    Mixed hyperlipidemia       Orders:    Comprehensive Metabolic Panel    Lipid Panel With / Chol / HDL Ratio    Hemoglobin A1c    TSH    T4, Free    Breast cancer screening by mammogram    Orders:    Mammo Screening Digital Tomosynthesis Bilateral With CAD; Future    Post-menopausal    Orders:    DEXA Bone Density Axial; Future    Abnormal finding of blood chemistry, unspecified    Orders:    Hemoglobin A1c     Preventative care- Follow heart healthy diet, drink water, walk daily. Wear seatbelts, wear helmets, wear sunscreens. Follow CDC guidelines for covid and flu          Follow Up   Return in about 4 weeks (around 4/10/2025) for Recheck.  Patient was given instructions and counseling regarding her condition or for health maintenance advice. Please see specific information pulled into the AVS if appropriate.

## 2025-03-15 NOTE — ASSESSMENT & PLAN NOTE
Orders:    Comprehensive Metabolic Panel    Lipid Panel With / Chol / HDL Ratio    Hemoglobin A1c    TSH    T4, Free     Sheath was exchanged in the left femoral artery with ACCESS KIT, S-RONN MINI, 4FR 10CM 0.018IN 40CM, NT/PT, ECHO ENHANCE NEEDLE.

## 2025-03-15 NOTE — ASSESSMENT & PLAN NOTE
Patient's (Body mass index is 33.98 kg/m².) indicates that they are obese (BMI >30) with health conditions that include dyslipidemias and GERD . Weight is unchanged. BMI  is above average; BMI management plan is completed. We discussed portion control, increasing exercise, and pharmacologic options including [id number].    Reviewed s/e GLP 1 including BBW medullary thyroid cancer. She has no personal history of medullary thyroid cancer or pancreatitis. Reviewed side effect profile and risks.   Orders:    Comprehensive Metabolic Panel    Lipid Panel With / Chol / HDL Ratio    Hemoglobin A1c    TSH    T4, Free

## 2025-03-26 DIAGNOSIS — R13.10 DYSPHAGIA, UNSPECIFIED TYPE: ICD-10-CM

## 2025-03-26 RX ORDER — OMEPRAZOLE 40 MG/1
40 CAPSULE, DELAYED RELEASE ORAL DAILY
Qty: 90 CAPSULE | Refills: 0 | Status: SHIPPED | OUTPATIENT
Start: 2025-03-26

## 2025-04-02 RX ORDER — EZETIMIBE 10 MG/1
10 TABLET ORAL DAILY
Qty: 30 TABLET | Refills: 3 | Status: SHIPPED | OUTPATIENT
Start: 2025-04-02

## 2025-04-25 ENCOUNTER — HOSPITAL ENCOUNTER (OUTPATIENT)
Dept: PET IMAGING | Facility: HOSPITAL | Age: 75
Discharge: HOME | End: 2025-04-25
Payer: MEDICARE

## 2025-04-25 ENCOUNTER — APPOINTMENT (OUTPATIENT)
Dept: WOMENS IMAGING | Facility: HOSPITAL | Age: 75
End: 2025-04-25
Payer: MEDICARE

## 2025-04-25 ENCOUNTER — TRANSCRIBE ORDERS (OUTPATIENT)
Dept: WOMENS IMAGING | Facility: HOSPITAL | Age: 75
End: 2025-04-25
Payer: MEDICARE

## 2025-04-25 DIAGNOSIS — Z78.0 POSTMENOPAUSAL STATUS (AGE-RELATED) (NATURAL): Primary | ICD-10-CM

## 2025-04-25 DIAGNOSIS — Z78.0 POST-MENOPAUSAL: ICD-10-CM

## 2025-04-25 PROCEDURE — 77080 DXA BONE DENSITY AXIAL: CPT

## 2025-05-01 ENCOUNTER — OFFICE VISIT (OUTPATIENT)
Dept: FAMILY MEDICINE CLINIC | Facility: CLINIC | Age: 75
End: 2025-05-01
Payer: MEDICARE

## 2025-05-01 VITALS
SYSTOLIC BLOOD PRESSURE: 122 MMHG | RESPIRATION RATE: 18 BRPM | HEIGHT: 60 IN | DIASTOLIC BLOOD PRESSURE: 82 MMHG | WEIGHT: 169 LBS | HEART RATE: 83 BPM | OXYGEN SATURATION: 93 % | BODY MASS INDEX: 33.18 KG/M2

## 2025-05-01 DIAGNOSIS — R79.89 ELEVATED TSH: ICD-10-CM

## 2025-05-01 DIAGNOSIS — R73.03 PREDIABETES: ICD-10-CM

## 2025-05-01 DIAGNOSIS — T50.905A WEIGHT LOSS DUE TO MEDICATION: Primary | ICD-10-CM

## 2025-05-01 DIAGNOSIS — R63.4 WEIGHT LOSS DUE TO MEDICATION: Primary | ICD-10-CM

## 2025-05-01 DIAGNOSIS — E66.811 CLASS 1 OBESITY DUE TO EXCESS CALORIES WITH SERIOUS COMORBIDITY AND BODY MASS INDEX (BMI) OF 33.0 TO 33.9 IN ADULT: ICD-10-CM

## 2025-05-01 DIAGNOSIS — E66.09 CLASS 1 OBESITY DUE TO EXCESS CALORIES WITH SERIOUS COMORBIDITY AND BODY MASS INDEX (BMI) OF 33.0 TO 33.9 IN ADULT: ICD-10-CM

## 2025-05-01 DIAGNOSIS — R73.09 ELEVATED GLUCOSE: ICD-10-CM

## 2025-05-01 PROBLEM — L82.0 INFLAMED SEBORRHEIC KERATOSIS: Status: ACTIVE | Noted: 2024-05-31

## 2025-05-01 PROBLEM — E11.9 DIABETES MELLITUS: Status: ACTIVE | Noted: 2024-06-05

## 2025-05-01 PROBLEM — L30.9 ECZEMA: Status: ACTIVE | Noted: 2024-05-31

## 2025-05-01 PROBLEM — E78.00 HYPERCHOLESTEROLEMIA: Status: ACTIVE | Noted: 2019-05-01

## 2025-05-01 PROBLEM — L57.0 ACTINIC KERATOSIS: Status: ACTIVE | Noted: 2019-05-01

## 2025-05-01 RX ORDER — NYSTATIN 100000 [USP'U]/G
POWDER TOPICAL 3 TIMES DAILY
Qty: 60 G | Refills: 3 | Status: SHIPPED | OUTPATIENT
Start: 2025-05-01 | End: 2025-05-31

## 2025-05-01 RX ORDER — SEMAGLUTIDE 0.5 MG/.5ML
0.5 INJECTION, SOLUTION SUBCUTANEOUS WEEKLY
Qty: 0.5 ML | Refills: 3 | Status: SHIPPED | OUTPATIENT
Start: 2025-05-01

## 2025-05-01 NOTE — PROGRESS NOTES
Noemi Neumann is a 74 y.o. female.     History of Present Illness     The following portions of the patient's history were reviewed and updated as appropriate: allergies, current medications, past family history, past medical history, past social history, past surgical history and problem list.       The patient is a 74-year-old female who presents for chronic illness management.    She reports a significant decrease in appetite, attributing it to her current medication regimen. Despite this, she maintains an adequate food intake. She has been on Wegovy for slightly over a month, which has resulted in a weight loss from 174 pounds to 169 pounds. She anticipates that her insurance will not cover the cost of Wegovy. Constipation was experienced prior to starting Wegovy, but this has been managed with daily fiber gummies. Regular physical activity is maintained, specifically walking her dog. A decreased craving for sweets is also reported. Occasionally, mild nausea is experienced post-injection, but it resolves spontaneously.    A recent yeast infection was successfully treated with a cream. However, a mammogram could not be performed due to the infection, and she was advised to wait until the infection cleared and an additional 3 weeks had passed before rescheduling. She has been using the same towel for a week without washing it.    Numbness and tingling in the feet are reported, but she does not consult a podiatrist for this issue. Information about B12 injections was received, but she has chosen to discuss this option during her current visit.    She is not currently under the care of an endocrinologist for her thyroid condition. No chest palpitations, changes in hair, skin, or nails are reported.    PAST SURGICAL HISTORY:  - Throat surgery (date unspecified)         Review of Systems        Current Outpatient Medications:     acetaminophen (TYLENOL) 500 MG tablet, Take 1 tablet by mouth  Every 6 (Six) Hours As Needed for Mild Pain. As needed, Disp: , Rfl:     clobetasol propionate (TEMOVATE) 0.05 % cream, Apply 1 Application topically to the appropriate area as directed 2 (Two) Times a Day. Do not use on face, Disp: 30 g, Rfl: 3    ezetimibe (ZETIA) 10 MG tablet, TAKE 1 TABLET BY MOUTH DAILY, Disp: 30 tablet, Rfl: 3    omeprazole (priLOSEC) 40 MG capsule, TAKE 1 CAPSULE BY MOUTH DAILY, Disp: 90 capsule, Rfl: 0    nystatin (MYCOSTATIN) 969220 UNIT/GM powder, Apply  topically to the appropriate area as directed 3 (Three) Times a Day for 30 days., Disp: 60 g, Rfl: 3    pravastatin (PRAVACHOL) 40 MG tablet, Take 1 tablet by mouth Daily. (Patient not taking: Reported on 5/1/2025), Disp: 90 tablet, Rfl: 3    Semaglutide-Weight Management (Wegovy) 0.5 MG/0.5ML solution auto-injector, Inject 0.5 mL under the skin into the appropriate area as directed 1 (One) Time Per Week., Disp: 0.5 mL, Rfl: 03    Objective   Physical Exam  Vitals reviewed.   Constitutional:       Appearance: Normal appearance.   HENT:      Mouth/Throat:      Mouth: Mucous membranes are moist.   Cardiovascular:      Rate and Rhythm: Normal rate and regular rhythm.      Pulses: Normal pulses.      Heart sounds: Normal heart sounds.   Pulmonary:      Effort: Pulmonary effort is normal.      Breath sounds: Normal breath sounds.   Musculoskeletal:      Cervical back: Normal range of motion and neck supple.   Skin:     General: Skin is warm.      Findings: Erythema present.   Neurological:      General: No focal deficit present.      Mental Status: She is alert.         Vitals:    05/01/25 1350   BP: 122/82   Pulse: 83   Resp: 18   SpO2: 93%     Body mass index is 33.01 kg/m².    Procedures    TSH   Date Value Ref Range Status   05/01/2025 4.520 (H) 0.270 - 4.200 uIU/mL Final     Hemoglobin A1C   Date Value Ref Range Status   05/01/2025 6.20 (H) 4.80 - 5.60 % Final     Comment:     Hemoglobin A1C Ranges:  Increased Risk for Diabetes  5.7% to  6.4%  Diabetes                     >= 6.5%  Diabetic Goal                < 7.0%                     Assessment & Plan   Problems Addressed this Visit       Class 1 obesity due to excess calories with serious comorbidity and body mass index (BMI) of 33.0 to 33.9 in adult    Relevant Medications    Semaglutide-Weight Management (Wegovy) 0.5 MG/0.5ML solution auto-injector     Other Visit Diagnoses         Weight loss due to medication    -  Primary      Elevated glucose        Relevant Orders    Microalbumin / Creatinine Urine Ratio - Urine, Clean Catch (Completed)    Comprehensive Metabolic Panel (Completed)    Hemoglobin A1c (Completed)      Elevated TSH        Relevant Orders    TSH (Completed)    T4, Free (Completed)      Prediabetes        Relevant Orders    TSH (Completed)    T4, Free (Completed)          Diagnoses         Codes Comments      Weight loss due to medication    -  Primary ICD-10-CM: R63.4, T50.905A  ICD-9-CM: 783.21, E947.9       Class 1 obesity due to excess calories with serious comorbidity and body mass index (BMI) of 33.0 to 33.9 in adult     ICD-10-CM: E66.811, E66.09, Z68.33  ICD-9-CM: 278.00, V85.33       Elevated glucose     ICD-10-CM: R73.09  ICD-9-CM: 790.29       Elevated TSH     ICD-10-CM: R79.89  ICD-9-CM: 794.5       Prediabetes     ICD-10-CM: R73.03  ICD-9-CM: 790.29           Orders Placed This Encounter   Procedures    Microalbumin / Creatinine Urine Ratio - Urine, Clean Catch     Release to patient:   Routine Release [[id number]]     [facility] Has the patient fasted?:   No    Comprehensive Metabolic Panel     Release to patient:   Routine Release [[id number]]     [facility] Has the patient fasted?:   No    Hemoglobin A1c     Release to patient:   Routine Release [[id number]]     [facility] Has the patient fasted?:   No    TSH     Release to patient:   Routine Release [[id number]]     [facility] Has the patient fasted?:   No    T4, Free     Release to patient:   Routine Release [[id number]]      [facility] Has the patient fasted?:   No            1. Weight management.  - She has been on Wegovy for over 30 days and has lost weight, dropping from 174 to 169 pounds.  - Reports a decreased appetite and occasional nausea after taking the injection.  - The dosage of semaglutide will be increased to 0.5 mg once weekly for a month.  - If severe side effects such as nausea or vomiting occur, reduce the dose back to 0.25 mg once weekly and notify the office.    2. Yeast infection.  - Experiencing recurrent yeast infections.  - Nystatin powder has been prescribed in addition to the current cream.  - Advised to apply the cream first, followed by the powder, and ensure proper hygiene by washing bras and towels in hot water and not reusing washcloths and towels.  - Consuming yogurt is recommended to help manage the infection.    3. Numbness and tingling in feet.  - Reports numbness and tingling in her feet.  - A B12 injection will be administered today to help alleviate these symptoms.  - Blood work will be conducted to check for B12 deficiency.    4. Thyroid management.  - Thyroid levels have been elevated.  - Blood work will be conducted today to recheck thyroid function.      5. Health maintenance.  - Unable to get a mammogram due to the yeast infection.  - Advised to reschedule the mammogram once the infection clears up and three weeks have passed.  - Blood work will be conducted today to check blood sugar, kidney, liver function, and urine.    Follow-up  - Follow up in 3 months.              Education provided in AVS   Return in about 3 months (around 8/1/2025).    Patient or patient representative verbalized consent for the use of Ambient Listening during the visit with  CLAUDIA Bernabe for chart documentation. 5/4/2025  22:30 EDT

## 2025-05-02 LAB
ALBUMIN SERPL-MCNC: 4.5 G/DL (ref 3.5–5.2)
ALBUMIN/CREAT UR: 7 MG/G CREAT (ref 0–29)
ALBUMIN/GLOB SERPL: 1.9 G/DL
ALP SERPL-CCNC: 70 U/L (ref 39–117)
ALT SERPL-CCNC: 21 U/L (ref 1–33)
AST SERPL-CCNC: 17 U/L (ref 1–32)
BILIRUB SERPL-MCNC: 0.3 MG/DL (ref 0–1.2)
BUN SERPL-MCNC: 19 MG/DL (ref 8–23)
BUN/CREAT SERPL: 17.8 (ref 7–25)
CALCIUM SERPL-MCNC: 9.4 MG/DL (ref 8.6–10.5)
CHLORIDE SERPL-SCNC: 105 MMOL/L (ref 98–107)
CO2 SERPL-SCNC: 28.5 MMOL/L (ref 22–29)
CREAT SERPL-MCNC: 1.07 MG/DL (ref 0.57–1)
CREAT UR-MCNC: 47.8 MG/DL
EGFRCR SERPLBLD CKD-EPI 2021: 55.3 ML/MIN/1.73
GLOBULIN SER CALC-MCNC: 2.4 GM/DL
GLUCOSE SERPL-MCNC: 79 MG/DL (ref 65–99)
HBA1C MFR BLD: 6.2 % (ref 4.8–5.6)
MICROALBUMIN UR-MCNC: 3.4 UG/ML
POTASSIUM SERPL-SCNC: 4.1 MMOL/L (ref 3.5–5.2)
PROT SERPL-MCNC: 6.9 G/DL (ref 6–8.5)
SODIUM SERPL-SCNC: 142 MMOL/L (ref 136–145)
T4 FREE SERPL-MCNC: 0.95 NG/DL (ref 0.92–1.68)
TSH SERPL DL<=0.005 MIU/L-ACNC: 4.52 UIU/ML (ref 0.27–4.2)

## 2025-05-30 ENCOUNTER — OFFICE VISIT (OUTPATIENT)
Dept: GASTROENTEROLOGY | Facility: CLINIC | Age: 75
End: 2025-05-30
Payer: MEDICARE

## 2025-05-30 VITALS
HEART RATE: 81 BPM | SYSTOLIC BLOOD PRESSURE: 116 MMHG | BODY MASS INDEX: 32.41 KG/M2 | WEIGHT: 165.1 LBS | DIASTOLIC BLOOD PRESSURE: 83 MMHG | HEIGHT: 60 IN | TEMPERATURE: 97.6 F

## 2025-05-30 DIAGNOSIS — R13.10 DYSPHAGIA, UNSPECIFIED TYPE: ICD-10-CM

## 2025-05-30 DIAGNOSIS — K21.9 GASTROESOPHAGEAL REFLUX DISEASE WITHOUT ESOPHAGITIS: Primary | ICD-10-CM

## 2025-05-30 PROCEDURE — 99214 OFFICE O/P EST MOD 30 MIN: CPT

## 2025-05-30 PROCEDURE — 1160F RVW MEDS BY RX/DR IN RCRD: CPT

## 2025-05-30 PROCEDURE — 1159F MED LIST DOCD IN RCRD: CPT

## 2025-05-30 RX ORDER — OMEPRAZOLE 40 MG/1
40 CAPSULE, DELAYED RELEASE ORAL DAILY
Qty: 90 CAPSULE | Refills: 0 | Status: SHIPPED | OUTPATIENT
Start: 2025-05-30

## 2025-05-30 NOTE — PROGRESS NOTES
"Chief Complaint  Difficulty Swallowing    Subjective          History of Present Illness    Leny Neumann is a  74 y.o. female presents for follow-up for GERD.  She is a patient of Dr. Akbar was last seen by me April 2024.    At last visit she was having worsening constipation over the last 6 months despite MiraLAX daily and fiber.  Reflux was well-controlled on omeprazole 40 mg at that time, but she had noted worsening difficulty swallowing.    Today she reports she started wegovy 8 weeks ago and has lost 5-10 pounds since. She does still have some trouble swallowing- with liquids mostly, the same as it always has been. She takes omeprazole 40mg as needed. She does not require this every day. She has been taking frequent NSAIDs but stopped this several weeks ago and is only taking tylenol.  She has been having some stomach pain typically after eating a salad and this is only been going on since she started Wegovy.    EGD 8/2024 with normal examined esophagus, normal stomach, normal examined duodenum.  No endoscopic abnormality to explain patient's dysphagia.    Colonoscopy 8/2024 noted polyps, diverticulosis, nonbleeding internal hemorrhoids and otherwise normal exam.    Colonoscopy 8/16/2018 --> polyps, diverticulosis, nonbleeding internal hemorrhoids.  Polyp showed hyperplastic change.  Recommend follow-up colonoscopy 5 years.     Objective   Vital Signs:   /83 (BP Location: Left arm, Patient Position: Sitting, Cuff Size: Adult)   Pulse 81   Temp 97.6 °F (36.4 °C) (Temporal)   Ht 152.4 cm (60\")   Wt 74.9 kg (165 lb 1.6 oz)   BMI 32.24 kg/m²       Physical Exam  Constitutional:       General: She is not in acute distress.     Appearance: Normal appearance.   Eyes:      General: No scleral icterus.  Pulmonary:      Effort: Pulmonary effort is normal.   Abdominal:      General: Abdomen is flat. There is no distension.      Tenderness: There is no abdominal tenderness. There is no guarding. "   Skin:     Coloration: Skin is not jaundiced.   Neurological:      General: No focal deficit present.      Mental Status: She is alert and oriented to person, place, and time.   Psychiatric:         Mood and Affect: Mood normal.         Behavior: Behavior normal.          Result Review :   The following data was reviewed by: Annika Paiz PA-C on 05/30/2025:  CMP          3/13/2025    10:46 5/1/2025    14:20   CMP   Glucose 112  79    BUN 12  19    Creatinine 0.90  1.07    EGFR 68.1  55.3    Sodium 142  142    Potassium 4.8  4.1    Chloride 106  105    Calcium 9.1  9.4    Total Protein 6.8  6.9    Albumin 4.2  4.5    Globulin 2.6  2.4    Total Bilirubin 0.3  0.3    Alkaline Phosphatase 63  70    AST (SGOT) 20  17    ALT (SGPT) 20  21    Albumin/Globulin Ratio 1.6  1.9    BUN/Creatinine Ratio 13.3  17.8                Assessment:   Diagnoses and all orders for this visit:    1. Gastroesophageal reflux disease without esophagitis (Primary)    2. Dysphagia, unspecified type  -     omeprazole (priLOSEC) 40 MG capsule; Take 1 capsule by mouth Daily.  Dispense: 90 capsule; Refill: 0          Plan:   - Kidney function was noted to be a little bit decreased on recent labs.  She only takes omeprazole intermittently and I encouraged her to continue to take this as little as possible.  She will have this rechecked with her primary care provider in a few weeks she reports  -She is having some stomach discomfort with solids especially after starting on Wegovy.  We discussed low residue diet and multiple small meals throughout the day to help alleviate this  -She is still having some difficulty swallowing.  She reports that it feels like she cannot control how much liquid to swallow 1 time and will get choked.  We discussed follow-up with speech therapy but she would like to hold off on this for now and will let me know if she changes her mind      Follow Up   Return in about 1 year (around 5/30/2026), or if symptoms worsen  or fail to improve.    Dragon dictation used throughout this note.         Annika Paiz PA-C  [facility]  [address] [place] [address]  Office: [phone]

## 2025-07-08 RX ORDER — EZETIMIBE 10 MG/1
10 TABLET ORAL DAILY
Qty: 30 TABLET | Refills: 3 | Status: SHIPPED | OUTPATIENT
Start: 2025-07-08

## 2025-08-06 ENCOUNTER — TELEPHONE (OUTPATIENT)
Dept: FAMILY MEDICINE CLINIC | Facility: CLINIC | Age: 75
End: 2025-08-06
Payer: MEDICARE

## 2025-08-06 RX ORDER — EZETIMIBE 10 MG/1
10 TABLET ORAL DAILY
Qty: 90 TABLET | Refills: 3 | Status: SHIPPED | OUTPATIENT
Start: 2025-08-06

## 2025-08-11 ENCOUNTER — OFFICE VISIT (OUTPATIENT)
Dept: FAMILY MEDICINE CLINIC | Facility: CLINIC | Age: 75
End: 2025-08-11
Payer: MEDICARE

## 2025-08-11 VITALS
OXYGEN SATURATION: 95 % | RESPIRATION RATE: 18 BRPM | SYSTOLIC BLOOD PRESSURE: 138 MMHG | WEIGHT: 160 LBS | BODY MASS INDEX: 31.41 KG/M2 | HEART RATE: 83 BPM | DIASTOLIC BLOOD PRESSURE: 78 MMHG | HEIGHT: 60 IN

## 2025-08-11 DIAGNOSIS — R73.03 PREDIABETES: ICD-10-CM

## 2025-08-11 DIAGNOSIS — K21.9 GASTROESOPHAGEAL REFLUX DISEASE WITHOUT ESOPHAGITIS: ICD-10-CM

## 2025-08-11 DIAGNOSIS — R79.89 ELEVATED TSH: ICD-10-CM

## 2025-08-11 DIAGNOSIS — T50.905A WEIGHT LOSS DUE TO MEDICATION: Primary | ICD-10-CM

## 2025-08-11 DIAGNOSIS — K59.03 DRUG INDUCED CONSTIPATION: ICD-10-CM

## 2025-08-11 DIAGNOSIS — E78.2 MIXED HYPERLIPIDEMIA: ICD-10-CM

## 2025-08-11 DIAGNOSIS — R63.4 WEIGHT LOSS DUE TO MEDICATION: Primary | ICD-10-CM

## 2025-08-11 DIAGNOSIS — R79.9 ABNORMAL FINDING OF BLOOD CHEMISTRY, UNSPECIFIED: ICD-10-CM

## 2025-08-11 LAB
ALBUMIN SERPL-MCNC: 4.3 G/DL (ref 3.5–5.2)
ALBUMIN/GLOB SERPL: 1.9 G/DL
ALP SERPL-CCNC: 61 U/L (ref 39–117)
ALT SERPL-CCNC: 21 U/L (ref 1–33)
AST SERPL-CCNC: 20 U/L (ref 1–32)
BILIRUB SERPL-MCNC: 0.3 MG/DL (ref 0–1.2)
BUN SERPL-MCNC: 13 MG/DL (ref 8–23)
BUN/CREAT SERPL: 11.9 (ref 7–25)
CALCIUM SERPL-MCNC: 9.4 MG/DL (ref 8.6–10.5)
CHLORIDE SERPL-SCNC: 105 MMOL/L (ref 98–107)
CHOLEST SERPL-MCNC: 264 MG/DL (ref 0–200)
CHOLEST/HDLC SERPL: 6.6 {RATIO}
CO2 SERPL-SCNC: 26.3 MMOL/L (ref 22–29)
CREAT SERPL-MCNC: 1.09 MG/DL (ref 0.57–1)
EGFRCR SERPLBLD CKD-EPI 2021: 53.8 ML/MIN/1.73
GLOBULIN SER CALC-MCNC: 2.3 GM/DL
GLUCOSE SERPL-MCNC: 97 MG/DL (ref 65–99)
HBA1C MFR BLD: 5.5 % (ref 4.8–5.6)
HDLC SERPL-MCNC: 40 MG/DL (ref 40–60)
LDLC SERPL CALC-MCNC: 197 MG/DL (ref 0–100)
POTASSIUM SERPL-SCNC: 5.3 MMOL/L (ref 3.5–5.2)
PROT SERPL-MCNC: 6.6 G/DL (ref 6–8.5)
SODIUM SERPL-SCNC: 141 MMOL/L (ref 136–145)
T4 FREE SERPL-MCNC: 1.02 NG/DL (ref 0.92–1.68)
TRIGL SERPL-MCNC: 146 MG/DL (ref 0–150)
TSH SERPL DL<=0.005 MIU/L-ACNC: 3.97 UIU/ML (ref 0.27–4.2)
VLDLC SERPL CALC-MCNC: 27 MG/DL (ref 5–40)

## 2025-08-26 DIAGNOSIS — Z12.31 BREAST CANCER SCREENING BY MAMMOGRAM: ICD-10-CM

## 2025-08-28 DIAGNOSIS — Z12.2 SCREENING FOR LUNG CANCER: Primary | ICD-10-CM

## 2025-08-28 DIAGNOSIS — Z87.891 PERSONAL HISTORY OF NICOTINE DEPENDENCE: ICD-10-CM

## (undated) DEVICE — SINGLE-USE BIOPSY FORCEPS: Brand: RADIAL JAW 4

## (undated) DEVICE — PCH INST SURG INVISISHIELD 2PCKT

## (undated) DEVICE — TUBING, SUCTION, 1/4" X 10', STRAIGHT: Brand: MEDLINE

## (undated) DEVICE — THE SINGLE USE ETRAP – POLYP TRAP IS USED FOR SUCTION RETRIEVAL OF ENDOSCOPICALLY REMOVED POLYPS.: Brand: ETRAP

## (undated) DEVICE — BITEBLOCK OMNI BLOC

## (undated) DEVICE — SNAR POLYP SENSATION STDOVL 27 240 BX40

## (undated) DEVICE — INTENDED FOR TISSUE SEPARATION, AND OTHER PROCEDURES THAT REQUIRE A SHARP SURGICAL BLADE TO PUNCTURE OR CUT.: Brand: BARD-PARKER ® CARBON RIB-BACK BLADES

## (undated) DEVICE — SUT ETHLN 5/0 PS2 18IN 1666H

## (undated) DEVICE — GLV SURG BIOGEL LTX PF 7 1/2

## (undated) DEVICE — FLEX ADVANTAGE 1500CC: Brand: FLEX ADVANTAGE

## (undated) DEVICE — THE TORRENT IRRIGATION SCOPE CONNECTOR IS USED WITH THE TORRENT IRRIGATION TUBING TO PROVIDE IRRIGATION FLUIDS SUCH AS STERILE WATER DURING GASTROINTESTINAL ENDOSCOPIC PROCEDURES WHEN USED IN CONJUNCTION WITH AN IRRIGATION PUMP (OR ELECTROSURGICAL UNIT).: Brand: TORRENT

## (undated) DEVICE — ADAPT CLN SCPE ENDO PORPOISE BX/50 DISP

## (undated) DEVICE — PK ENT 40

## (undated) DEVICE — SUREFIT, DUAL DISPERSIVE ELECTRODE, CONTACT QUALITY MONITOR: Brand: SUREFIT

## (undated) DEVICE — GOWN ISOL W/THUMB UNIV BLU BX/15

## (undated) DEVICE — CANNULA,ADULT,SOFT-TOUCH,7'TUBE,UC: Brand: PENDING

## (undated) DEVICE — TBG PENCL TELESCP MEGADYNE SMOKE EVAC 10FT

## (undated) DEVICE — SYRINGE, LUER SLIP, STERILE, 60ML: Brand: MEDLINE

## (undated) DEVICE — DRAPE,REIN 53X77,STERILE: Brand: MEDLINE

## (undated) DEVICE — GAUZE,SPONGE,4"X4",16PLY,XRAY,STRL,LF: Brand: MEDLINE

## (undated) DEVICE — SUT SILK 3/0 FS1 18IN 684G

## (undated) DEVICE — Device: Brand: DEFENDO AIR/WATER/SUCTION AND BIOPSY VALVE

## (undated) DEVICE — SKIN PREP TRAY W/CHG: Brand: MEDLINE INDUSTRIES, INC.

## (undated) DEVICE — HS FOCUS 17 CM PLUS ADAPTIVE: Brand: HARMONIC

## (undated) DEVICE — DISPOSABLE BIPOLAR CABLE 12FT. (3.6M): Brand: KIRWAN

## (undated) DEVICE — TOWEL,OR,DSP,ST,BLUE,STD,4/PK,20PK/CS: Brand: MEDLINE

## (undated) DEVICE — STANDARD HYPODERMIC NEEDLE,POLYPROPYLENE HUB: Brand: MONOJECT

## (undated) DEVICE — GAUZE,SPONGE,FLUFF,6"X6.75",STRL,5/TRAY: Brand: MEDLINE

## (undated) DEVICE — Device

## (undated) DEVICE — GLV SURG SENSICARE ORTHO PF LF 8.5 STRL

## (undated) DEVICE — IRRIGATOR BULB ASEPTO 60CC STRL

## (undated) DEVICE — MSK ENDO PORT O2 POM ELITE CURAPLEX A/

## (undated) DEVICE — UNDYED BRAIDED (POLYGLACTIN 910), SYNTHETIC ABSORBABLE SUTURE: Brand: COATED VICRYL

## (undated) DEVICE — KT ORCA ORCAPOD DISP STRL

## (undated) DEVICE — SNAR POLYP CAPTIVATOR/COLD STFF RND 10MM 240CM

## (undated) DEVICE — GOWN,NON-REINFORCED,SIRUS,SET IN SLV,XL: Brand: MEDLINE

## (undated) DEVICE — SINGLE-USE POLYPECTOMY SNARE: Brand: SENSATION SHORT THROW

## (undated) DEVICE — VIAL FORMLN CAP 10PCT 20ML

## (undated) DEVICE — TRAP FLD MINIVAC MEGADYNE 100ML

## (undated) DEVICE — SUT VIC 3/0 SH 27IN J416H